# Patient Record
Sex: FEMALE | Race: BLACK OR AFRICAN AMERICAN | NOT HISPANIC OR LATINO | Employment: UNEMPLOYED | URBAN - METROPOLITAN AREA
[De-identification: names, ages, dates, MRNs, and addresses within clinical notes are randomized per-mention and may not be internally consistent; named-entity substitution may affect disease eponyms.]

---

## 2023-09-12 ENCOUNTER — HOSPITAL ENCOUNTER (OUTPATIENT)
Dept: DATA CONVERSION | Facility: HOSPITAL | Age: 43
Discharge: HOME | End: 2023-09-12
Payer: COMMERCIAL

## 2023-09-12 DIAGNOSIS — E55.9 VITAMIN D DEFICIENCY, UNSPECIFIED: ICD-10-CM

## 2023-09-12 DIAGNOSIS — D86.9 SARCOIDOSIS, UNSPECIFIED: ICD-10-CM

## 2023-09-12 DIAGNOSIS — Z79.899 OTHER LONG TERM (CURRENT) DRUG THERAPY: ICD-10-CM

## 2023-09-12 DIAGNOSIS — M06.09 RHEUMATOID ARTHRITIS WITHOUT RHEUMATOID FACTOR, MULTIPLE SITES (MULTI): ICD-10-CM

## 2023-09-12 LAB
25(OH)D3 SERPL-MCNC: 12 NG/ML (ref 31–100)
ALBUMIN SERPL-MCNC: 4.5 GM/DL (ref 3.5–5)
ALBUMIN/GLOB SERPL: 1.5 RATIO (ref 1.5–3)
ALP BLD-CCNC: 67 U/L (ref 35–125)
ALT SERPL-CCNC: 17 U/L (ref 5–40)
ANION GAP SERPL CALCULATED.3IONS-SCNC: 11 MMOL/L (ref 0–19)
AST SERPL-CCNC: 19 U/L (ref 5–40)
BACTERIA UR QL AUTO: NEGATIVE
BASOPHILS # BLD AUTO: 0.07 K/UL (ref 0–0.22)
BASOPHILS NFR BLD AUTO: 1.5 % (ref 0–1)
BILIRUB SERPL-MCNC: 0.4 MG/DL (ref 0.1–1.2)
BILIRUB UR QL STRIP.AUTO: NEGATIVE
BUN SERPL-MCNC: 8 MG/DL (ref 8–25)
BUN/CREAT SERPL: 10 RATIO (ref 8–21)
CALCIUM SERPL-MCNC: 9.9 MG/DL (ref 8.5–10.4)
CHLORIDE SERPL-SCNC: 105 MMOL/L (ref 97–107)
CK MB CFR SERPL CALC: 1.3 % (ref 0–3.5)
CK MB SERPL-MCNC: 2 NG/ML (ref 0–5)
CK SERPL-CCNC: 160 U/L (ref 24–195)
CLARITY UR: CLEAR
CO2 SERPL-SCNC: 24 MMOL/L (ref 24–31)
COLOR UR: ABNORMAL
CREAT SERPL-MCNC: 0.8 MG/DL (ref 0.4–1.6)
CRP SERPL-MCNC: 0.3 MG/DL (ref 0–2)
DEPRECATED RDW RBC AUTO: 37.7 FL (ref 37–54)
DIFFERENTIAL METHOD BLD: ABNORMAL
EOSINOPHIL # BLD AUTO: 0.31 K/UL (ref 0–0.45)
EOSINOPHIL NFR BLD: 6.5 % (ref 0–3)
ERYTHROCYTE [DISTWIDTH] IN BLOOD BY AUTOMATED COUNT: 11.9 % (ref 11.7–15)
ERYTHROCYTE [SEDIMENTATION RATE] IN BLOOD BY WESTERGREN METHOD: 38 MM/HR (ref 0–20)
GFR SERPL CREATININE-BSD FRML MDRD: 94 ML/MIN/1.73 M2
GLOBULIN SER-MCNC: 3.1 G/DL (ref 1.9–3.7)
GLUCOSE SERPL-MCNC: 83 MG/DL (ref 65–99)
GLUCOSE UR STRIP.AUTO-MCNC: NEGATIVE MG/DL
HCT VFR BLD AUTO: 42 % (ref 36–44)
HGB BLD-MCNC: 13.1 GM/DL (ref 12–15)
HGB UR QL STRIP.AUTO: 2 /HPF (ref 0–3)
HGB UR QL: ABNORMAL
HYALINE CASTS UR QL AUTO: 3 /LPF
IMM GRANULOCYTES # BLD AUTO: 0.01 K/UL (ref 0–0.1)
KETONES UR QL STRIP.AUTO: NEGATIVE
LEUKOCYTE ESTERASE UR QL STRIP.AUTO: NEGATIVE
LYMPHOCYTES # BLD AUTO: 2.73 K/UL (ref 1.2–3.2)
LYMPHOCYTES NFR BLD MANUAL: 57.5 % (ref 20–40)
MCH RBC QN AUTO: 26.8 PG (ref 26–34)
MCHC RBC AUTO-ENTMCNC: 31.2 % (ref 31–37)
MCV RBC AUTO: 86.1 FL (ref 80–100)
MICROSCOPIC (UA): ABNORMAL
MONOCYTES # BLD AUTO: 0.26 K/UL (ref 0–0.8)
MONOCYTES NFR BLD MANUAL: 5.5 % (ref 0–8)
NEUTROPHILS # BLD AUTO: 1.37 K/UL
NEUTROPHILS # BLD AUTO: 1.37 K/UL (ref 1.8–7.7)
NEUTROPHILS.IMMATURE NFR BLD: 0.2 % (ref 0–1)
NEUTS SEG NFR BLD: 28.8 % (ref 50–70)
NITRITE UR QL STRIP.AUTO: NEGATIVE
NRBC BLD-RTO: 0 /100 WBC
PH UR STRIP.AUTO: 6 [PH] (ref 4.6–8)
PLATELET # BLD AUTO: 330 K/UL (ref 150–450)
PMV BLD AUTO: 10.7 CU (ref 7–12.6)
POTASSIUM SERPL-SCNC: 4.3 MMOL/L (ref 3.4–5.1)
PROT SERPL-MCNC: 7.6 G/DL (ref 5.9–7.9)
PROT UR STRIP.AUTO-MCNC: NEGATIVE MG/DL
RBC # BLD AUTO: 4.88 M/UL (ref 4–4.9)
REF LAB TEST RESULTS: NORMAL
SODIUM SERPL-SCNC: 140 MMOL/L (ref 133–145)
SP GR UR STRIP.AUTO: 1.02 (ref 1–1.03)
SQUAMOUS UR QL AUTO: ABNORMAL /HPF
URINE CULTURE: ABNORMAL
UROBILINOGEN UR QL STRIP.AUTO: NORMAL MG/DL (ref 0–1)
WBC # BLD AUTO: 4.8 K/UL (ref 4.5–11)
WBC #/AREA URNS AUTO: 2 /HPF (ref 0–3)

## 2023-09-14 LAB — ACE SERPL-CCNC: NORMAL U/L

## 2023-09-15 LAB
1,25(OH)2D3 SERPL-MCNC: NORMAL PG/ML
NIL(NEG) CONTROL SPOT COUNT: NORMAL
PANEL A SPOT COUNT: NORMAL
PANEL B SPOT COUNT: NORMAL
POS CONTROL SPOT COUNT: NORMAL
T SPOT RESULT: NORMAL

## 2023-09-25 ENCOUNTER — DOCUMENTATION (OUTPATIENT)
Dept: PRIMARY CARE | Facility: CLINIC | Age: 43
End: 2023-09-25
Payer: COMMERCIAL

## 2023-10-06 DIAGNOSIS — G89.29 OTHER CHRONIC PAIN: Primary | ICD-10-CM

## 2023-10-06 RX ORDER — OXYCODONE HYDROCHLORIDE AND ACETAMINOPHEN 5; 325 MG/1; MG/1
1 TABLET ORAL EVERY 6 HOURS PRN
COMMUNITY
Start: 2022-06-28 | End: 2023-10-06 | Stop reason: SDUPTHER

## 2023-10-07 RX ORDER — OXYCODONE HYDROCHLORIDE AND ACETAMINOPHEN 5; 325 MG/1; MG/1
1 TABLET ORAL EVERY 6 HOURS PRN
Qty: 28 TABLET | Refills: 0 | Status: SHIPPED | OUTPATIENT
Start: 2023-10-07 | End: 2023-10-13 | Stop reason: SDUPTHER

## 2023-10-13 DIAGNOSIS — G89.29 OTHER CHRONIC PAIN: ICD-10-CM

## 2023-10-13 RX ORDER — OXYCODONE HYDROCHLORIDE AND ACETAMINOPHEN 5; 325 MG/1; MG/1
1 TABLET ORAL EVERY 6 HOURS PRN
Qty: 28 TABLET | Refills: 0 | Status: SHIPPED | OUTPATIENT
Start: 2023-10-13 | End: 2023-10-20

## 2023-10-17 RX ORDER — ALBUTEROL SULFATE 90 UG/1
1 AEROSOL, METERED RESPIRATORY (INHALATION) EVERY 4 HOURS PRN
COMMUNITY
End: 2023-10-20 | Stop reason: SDUPTHER

## 2023-10-17 RX ORDER — ALBUTEROL SULFATE 90 UG/1
1 AEROSOL, METERED RESPIRATORY (INHALATION) EVERY 4 HOURS PRN
Qty: 18 G | Refills: 2 | Status: CANCELLED | OUTPATIENT
Start: 2023-10-17

## 2023-10-17 NOTE — TELEPHONE ENCOUNTER
Patient requests refill on rescue inhaler. Last appt at [facility] Jan. 2023. I tried to schedule her for a FUV. She stated she just started a job and can't take off right now. I put her on list to call for a FUVThanks.

## 2023-10-18 ENCOUNTER — TELEPHONE (OUTPATIENT)
Dept: PRIMARY CARE | Facility: CLINIC | Age: 43
End: 2023-10-18
Payer: COMMERCIAL

## 2023-10-20 ENCOUNTER — PATIENT OUTREACH (OUTPATIENT)
Dept: PRIMARY CARE | Facility: CLINIC | Age: 43
End: 2023-10-20
Payer: COMMERCIAL

## 2023-10-20 DIAGNOSIS — M06.09 POLYARTHRITIS WITH NEGATIVE RHEUMATOID FACTOR (MULTI): ICD-10-CM

## 2023-10-20 DIAGNOSIS — M19.90 CHRONIC ARTHRITIS: ICD-10-CM

## 2023-10-20 DIAGNOSIS — J45.909 ASTHMA, UNSPECIFIED ASTHMA SEVERITY, UNSPECIFIED WHETHER COMPLICATED, UNSPECIFIED WHETHER PERSISTENT (HHS-HCC): Primary | ICD-10-CM

## 2023-10-20 PROCEDURE — 99490 CHRNC CARE MGMT STAFF 1ST 20: CPT | Performed by: STUDENT IN AN ORGANIZED HEALTH CARE EDUCATION/TRAINING PROGRAM

## 2023-10-20 PROCEDURE — 99439 CHRNC CARE MGMT STAF EA ADDL: CPT | Performed by: STUDENT IN AN ORGANIZED HEALTH CARE EDUCATION/TRAINING PROGRAM

## 2023-10-20 RX ORDER — BENZONATATE 100 MG/1
100 CAPSULE ORAL 3 TIMES DAILY PRN
COMMUNITY

## 2023-10-20 RX ORDER — ALBUTEROL SULFATE 0.83 MG/ML
2.5 SOLUTION RESPIRATORY (INHALATION) EVERY 6 HOURS PRN
COMMUNITY

## 2023-10-20 RX ORDER — MECLIZINE HYDROCHLORIDE 25 MG/1
25 TABLET ORAL 2 TIMES DAILY
COMMUNITY

## 2023-10-20 RX ORDER — DUPILUMAB 300 MG/2ML
300 INJECTION, SOLUTION SUBCUTANEOUS
COMMUNITY

## 2023-10-20 RX ORDER — CYCLOBENZAPRINE HCL 10 MG
10 TABLET ORAL AS NEEDED
COMMUNITY
End: 2024-02-06 | Stop reason: SDUPTHER

## 2023-10-20 RX ORDER — TRAMADOL HYDROCHLORIDE 50 MG/1
50 TABLET ORAL 2 TIMES DAILY
COMMUNITY
End: 2024-03-12 | Stop reason: SDUPTHER

## 2023-10-20 RX ORDER — NAPROXEN 500 MG/1
500 TABLET ORAL
COMMUNITY
End: 2024-01-09 | Stop reason: ALTCHOICE

## 2023-10-20 RX ORDER — ALBUTEROL SULFATE 90 UG/1
1 AEROSOL, METERED RESPIRATORY (INHALATION) EVERY 4 HOURS PRN
Qty: 18 G | Refills: 2 | Status: SHIPPED | OUTPATIENT
Start: 2023-10-20 | End: 2024-02-28 | Stop reason: SDUPTHER

## 2023-10-20 RX ORDER — FLUTICASONE PROPIONATE 50 MCG
1 SPRAY, SUSPENSION (ML) NASAL DAILY
COMMUNITY

## 2023-10-20 RX ORDER — SULFASALAZINE 500 MG/1
500 TABLET, DELAYED RELEASE ORAL 2 TIMES DAILY
COMMUNITY
End: 2024-02-06 | Stop reason: SDUPTHER

## 2023-10-20 RX ORDER — FOLIC ACID 1 MG/1
1 TABLET ORAL DAILY
COMMUNITY
End: 2024-01-10 | Stop reason: ALTCHOICE

## 2023-10-20 NOTE — PROGRESS NOTES
LOV 6/21/2023= BP: 110/70, HR: 67 /min, Temp: 98.7, Oxygen sat %: 98.  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 Medications reviewed and updated.  Managing pain with combination Percocet BID, muscle relaxants, Tramadol.  Stands and stretches as job allows.  Utilizing equipment such as cane and walker as needed for safety.  Has decided to not pursue Disability Claim at this time while she has gainful employment.  Spouse is social support for her at this time.  Next follow up call planned 1 month

## 2023-10-24 DIAGNOSIS — G89.29 OTHER CHRONIC PAIN: ICD-10-CM

## 2023-10-25 RX ORDER — OXYCODONE AND ACETAMINOPHEN 5; 325 MG/1; MG/1
1 TABLET ORAL EVERY 6 HOURS PRN
Qty: 5 TABLET | Refills: 0 | Status: SHIPPED | OUTPATIENT
Start: 2023-10-25 | End: 2023-11-06 | Stop reason: SDUPTHER

## 2023-11-06 DIAGNOSIS — G89.29 OTHER CHRONIC PAIN: ICD-10-CM

## 2023-11-06 RX ORDER — OXYCODONE AND ACETAMINOPHEN 5; 325 MG/1; MG/1
1 TABLET ORAL EVERY 4 HOURS PRN
Qty: 28 TABLET | Refills: 0 | Status: SHIPPED | OUTPATIENT
Start: 2023-11-06 | End: 2023-11-13

## 2023-11-06 NOTE — TELEPHONE ENCOUNTER
Ok to give letter for cordless request, thanks.      Letter completed & Dr Garcia signed & sent---FINN

## 2023-11-06 NOTE — TELEPHONE ENCOUNTER
PT CALLED, NEEDS REFILL OF OXYCODONE BECAUSE LAST REFILL WAS ONLY 5 PILLS.    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

## 2023-11-08 ENCOUNTER — TELEPHONE (OUTPATIENT)
Dept: RHEUMATOLOGY | Facility: CLINIC | Age: 43
End: 2023-11-08
Payer: COMMERCIAL

## 2023-11-08 DIAGNOSIS — U07.1 COVID: Primary | ICD-10-CM

## 2023-11-08 NOTE — TELEPHONE ENCOUNTER
PT CALLED, SHE IS COVID +. SYMPTOMS STARTED MONDAY; ASKING FOR THE PILLS SHE HAS GOTTEN BEFORE. PT INSTRUCTED TO PUSH FLUIDS, TAKE ZINC VITAMIN C & D

## 2023-11-10 NOTE — TELEPHONE ENCOUNTER
Rx sent. Pt to take half dose and/or increase time in between pain medication due to interaction/oversedation. Pt to also check with pharmacist in case there are other interactions with her current meds.

## 2023-11-13 ENCOUNTER — TELEPHONE (OUTPATIENT)
Dept: RHEUMATOLOGY | Facility: CLINIC | Age: 43
End: 2023-11-13
Payer: COMMERCIAL

## 2023-11-13 DIAGNOSIS — G89.29 OTHER CHRONIC PAIN: Primary | ICD-10-CM

## 2023-11-13 NOTE — TELEPHONE ENCOUNTER
PT CALLED, HAS LARYNGITIS & ASKING FOR ADVISE. EXPLAINED TO PT THAT THE ONLY TREATMENT IS VOICE REST. MAY USE LOZENGES AND/OR GARGLE WITH SALT WATER BUT NEEDS TO NOT SPEAK. ALSO WANTED TO LET YOU KNOW PHARMACY FILLED LATEST Rx WITH INDOMET  & OXYCODONE WORKS BETTER

## 2023-11-14 ENCOUNTER — APPOINTMENT (OUTPATIENT)
Dept: RADIOLOGY | Facility: HOSPITAL | Age: 43
End: 2023-11-14
Payer: COMMERCIAL

## 2023-11-14 ENCOUNTER — HOSPITAL ENCOUNTER (EMERGENCY)
Facility: HOSPITAL | Age: 43
Discharge: HOME | End: 2023-11-14
Attending: STUDENT IN AN ORGANIZED HEALTH CARE EDUCATION/TRAINING PROGRAM
Payer: COMMERCIAL

## 2023-11-14 ENCOUNTER — HOSPITAL ENCOUNTER (OUTPATIENT)
Dept: CARDIOLOGY | Facility: HOSPITAL | Age: 43
Discharge: HOME | End: 2023-11-14
Payer: COMMERCIAL

## 2023-11-14 VITALS
HEART RATE: 66 BPM | OXYGEN SATURATION: 100 % | DIASTOLIC BLOOD PRESSURE: 61 MMHG | WEIGHT: 165 LBS | HEIGHT: 65 IN | RESPIRATION RATE: 20 BRPM | BODY MASS INDEX: 27.49 KG/M2 | SYSTOLIC BLOOD PRESSURE: 119 MMHG | TEMPERATURE: 97.2 F

## 2023-11-14 DIAGNOSIS — J45.901 MILD ASTHMA WITH EXACERBATION, UNSPECIFIED WHETHER PERSISTENT (HHS-HCC): Primary | ICD-10-CM

## 2023-11-14 DIAGNOSIS — B34.9 VIRAL ILLNESS: ICD-10-CM

## 2023-11-14 LAB
ALBUMIN SERPL-MCNC: 4 G/DL (ref 3.5–5)
ALP BLD-CCNC: 62 U/L (ref 35–125)
ALT SERPL-CCNC: 16 U/L (ref 5–40)
ANION GAP SERPL CALC-SCNC: 9 MMOL/L
AST SERPL-CCNC: 17 U/L (ref 5–40)
ATRIAL RATE: 65 BPM
BASOPHILS # BLD AUTO: 0.04 X10*3/UL (ref 0–0.1)
BASOPHILS NFR BLD AUTO: 0.6 %
BILIRUB SERPL-MCNC: <0.2 MG/DL (ref 0.1–1.2)
BUN SERPL-MCNC: 5 MG/DL (ref 8–25)
CALCIUM SERPL-MCNC: 9.4 MG/DL (ref 8.5–10.4)
CHLORIDE SERPL-SCNC: 105 MMOL/L (ref 97–107)
CO2 SERPL-SCNC: 26 MMOL/L (ref 24–31)
CREAT SERPL-MCNC: 0.7 MG/DL (ref 0.4–1.6)
EOSINOPHIL # BLD AUTO: 0.28 X10*3/UL (ref 0–0.7)
EOSINOPHIL NFR BLD AUTO: 4 %
ERYTHROCYTE [DISTWIDTH] IN BLOOD BY AUTOMATED COUNT: 12.1 % (ref 11.5–14.5)
FLUAV RNA RESP QL NAA+PROBE: NOT DETECTED
FLUBV RNA RESP QL NAA+PROBE: NOT DETECTED
GFR SERPL CREATININE-BSD FRML MDRD: >90 ML/MIN/1.73M*2
GLUCOSE SERPL-MCNC: 101 MG/DL (ref 65–99)
HCT VFR BLD AUTO: 39.1 % (ref 36–46)
HGB BLD-MCNC: 12.1 G/DL (ref 12–16)
IMM GRANULOCYTES # BLD AUTO: 0.03 X10*3/UL (ref 0–0.7)
IMM GRANULOCYTES NFR BLD AUTO: 0.4 % (ref 0–0.9)
LYMPHOCYTES # BLD AUTO: 2.67 X10*3/UL (ref 1.2–4.8)
LYMPHOCYTES NFR BLD AUTO: 38.4 %
MCH RBC QN AUTO: 26.2 PG (ref 26–34)
MCHC RBC AUTO-ENTMCNC: 30.9 G/DL (ref 32–36)
MCV RBC AUTO: 85 FL (ref 80–100)
MONOCYTES # BLD AUTO: 0.45 X10*3/UL (ref 0.1–1)
MONOCYTES NFR BLD AUTO: 6.5 %
NEUTROPHILS # BLD AUTO: 3.48 X10*3/UL (ref 1.2–7.7)
NEUTROPHILS NFR BLD AUTO: 50.1 %
NRBC BLD-RTO: 0 /100 WBCS (ref 0–0)
P AXIS: 71 DEGREES
P OFFSET: 191 MS
P ONSET: 147 MS
PLATELET # BLD AUTO: 336 X10*3/UL (ref 150–450)
POTASSIUM SERPL-SCNC: 4 MMOL/L (ref 3.4–5.1)
PR INTERVAL: 156 MS
PROT SERPL-MCNC: 7.2 G/DL (ref 5.9–7.9)
Q ONSET: 225 MS
QRS COUNT: 10 BEATS
QRS DURATION: 68 MS
QT INTERVAL: 406 MS
QTC CALCULATION(BAZETT): 422 MS
QTC FREDERICIA: 416 MS
R AXIS: 87 DEGREES
RBC # BLD AUTO: 4.62 X10*6/UL (ref 4–5.2)
SARS-COV-2 RNA RESP QL NAA+PROBE: NOT DETECTED
SODIUM SERPL-SCNC: 140 MMOL/L (ref 133–145)
T AXIS: 65 DEGREES
T OFFSET: 428 MS
TROPONIN T SERPL-MCNC: <6 NG/L
VENTRICULAR RATE: 65 BPM
WBC # BLD AUTO: 7 X10*3/UL (ref 4.4–11.3)

## 2023-11-14 PROCEDURE — 94760 N-INVAS EAR/PLS OXIMETRY 1: CPT

## 2023-11-14 PROCEDURE — 93005 ELECTROCARDIOGRAM TRACING: CPT

## 2023-11-14 PROCEDURE — 2500000002 HC RX 250 W HCPCS SELF ADMINISTERED DRUGS (ALT 637 FOR MEDICARE OP, ALT 636 FOR OP/ED): Performed by: PHYSICIAN ASSISTANT

## 2023-11-14 PROCEDURE — 36415 COLL VENOUS BLD VENIPUNCTURE: CPT | Performed by: PHYSICIAN ASSISTANT

## 2023-11-14 PROCEDURE — 99285 EMERGENCY DEPT VISIT HI MDM: CPT | Mod: 25 | Performed by: STUDENT IN AN ORGANIZED HEALTH CARE EDUCATION/TRAINING PROGRAM

## 2023-11-14 PROCEDURE — 2500000004 HC RX 250 GENERAL PHARMACY W/ HCPCS (ALT 636 FOR OP/ED): Performed by: PHYSICIAN ASSISTANT

## 2023-11-14 PROCEDURE — 71045 X-RAY EXAM CHEST 1 VIEW: CPT

## 2023-11-14 PROCEDURE — 80053 COMPREHEN METABOLIC PANEL: CPT | Performed by: PHYSICIAN ASSISTANT

## 2023-11-14 PROCEDURE — 85025 COMPLETE CBC W/AUTO DIFF WBC: CPT | Performed by: PHYSICIAN ASSISTANT

## 2023-11-14 PROCEDURE — 99284 EMERGENCY DEPT VISIT MOD MDM: CPT | Performed by: STUDENT IN AN ORGANIZED HEALTH CARE EDUCATION/TRAINING PROGRAM

## 2023-11-14 PROCEDURE — 84484 ASSAY OF TROPONIN QUANT: CPT | Performed by: PHYSICIAN ASSISTANT

## 2023-11-14 PROCEDURE — 87636 SARSCOV2 & INF A&B AMP PRB: CPT | Performed by: PHYSICIAN ASSISTANT

## 2023-11-14 PROCEDURE — 96374 THER/PROPH/DIAG INJ IV PUSH: CPT | Performed by: STUDENT IN AN ORGANIZED HEALTH CARE EDUCATION/TRAINING PROGRAM

## 2023-11-14 RX ORDER — IPRATROPIUM BROMIDE AND ALBUTEROL SULFATE 2.5; .5 MG/3ML; MG/3ML
6 SOLUTION RESPIRATORY (INHALATION) ONCE
Status: COMPLETED | OUTPATIENT
Start: 2023-11-14 | End: 2023-11-14

## 2023-11-14 RX ADMIN — IPRATROPIUM BROMIDE AND ALBUTEROL SULFATE 6 ML: .5; 3 SOLUTION RESPIRATORY (INHALATION) at 09:05

## 2023-11-14 RX ADMIN — SODIUM CHLORIDE 1000 ML: 900 INJECTION, SOLUTION INTRAVENOUS at 09:05

## 2023-11-14 RX ADMIN — METHYLPREDNISOLONE SODIUM SUCCINATE 125 MG: 125 INJECTION, POWDER, FOR SOLUTION INTRAMUSCULAR; INTRAVENOUS at 09:05

## 2023-11-14 ASSESSMENT — PAIN DESCRIPTION - DESCRIPTORS: DESCRIPTORS: ACHING

## 2023-11-14 ASSESSMENT — COLUMBIA-SUICIDE SEVERITY RATING SCALE - C-SSRS
2. HAVE YOU ACTUALLY HAD ANY THOUGHTS OF KILLING YOURSELF?: NO
6. HAVE YOU EVER DONE ANYTHING, STARTED TO DO ANYTHING, OR PREPARED TO DO ANYTHING TO END YOUR LIFE?: NO
1. IN THE PAST MONTH, HAVE YOU WISHED YOU WERE DEAD OR WISHED YOU COULD GO TO SLEEP AND NOT WAKE UP?: NO

## 2023-11-14 ASSESSMENT — PAIN SCALES - GENERAL
PAINLEVEL_OUTOF10: 6
PAINLEVEL_OUTOF10: 3

## 2023-11-14 ASSESSMENT — PAIN DESCRIPTION - ORIENTATION: ORIENTATION: RIGHT;LEFT

## 2023-11-14 ASSESSMENT — PAIN DESCRIPTION - PAIN TYPE: TYPE: ACUTE PAIN

## 2023-11-14 ASSESSMENT — PAIN - FUNCTIONAL ASSESSMENT
PAIN_FUNCTIONAL_ASSESSMENT: 0-10
PAIN_FUNCTIONAL_ASSESSMENT: 0-10

## 2023-11-14 ASSESSMENT — PAIN DESCRIPTION - ONSET: ONSET: ONGOING

## 2023-11-14 ASSESSMENT — PAIN DESCRIPTION - PROGRESSION: CLINICAL_PROGRESSION: NOT CHANGED

## 2023-11-14 ASSESSMENT — PAIN DESCRIPTION - FREQUENCY: FREQUENCY: CONSTANT/CONTINUOUS

## 2023-11-14 ASSESSMENT — PAIN DESCRIPTION - LOCATION: LOCATION: NECK

## 2023-11-14 NOTE — DISCHARGE INSTRUCTIONS
Thank you for choosing [facility]  for your emergency care.    Please return to the Emergency Department immediately if new or worsening symptoms occur. Symptoms that are most concerning include worsening shortness of breath, fevers, worsening cough that necessitate immediate return.     It is important to remember that your care does not end here and you must continue to monitor your condition closely. Please return to the emergency department for any worsening or concerning signs or symptoms as directed by our conversations and the discharge instructions. Otherwise please follow up with your doctor in 2 days if no better or worse. If you do not have a doctor please contact the referral number on your discharge instructions. Please contact any physician specialists provided in your discharge notes as it is very important to follow up with them regarding your condition. If you are unable to reach the physicians provided, please come back to the Emergency Department at any time.      As always, please take medications as directed. If you have any questions at all regarding your medications, please contact the pharmacist, the emergency department, or your doctor. Before taking any medication prescribed in the Emergency Department, please review the medication side effects and drug interactions as they may interact with your home medications.     Having trouble affording medications? Try [employer] ! (This is not a hospital endorsed website, merely a recommendation based on my own personal experiences with [employer])       Hayden Barber MD

## 2023-11-14 NOTE — ED PROVIDER NOTES
HPI   Chief Complaint   Patient presents with    Shortness of Breath     Asthma exacerbation       HPI  43-year-old female here for dyspnea, history of asthma and sarcoidosis and rheumatoid conditions, patient has indicated that she has had 2 weeks of generalized viral-like symptoms and its been causing asthma exacerbations.  She says that she feels wheezy and has been having trouble managing her symptoms at home, she wanted to come in today for assessment.                  No data recorded                Patient History   Past Medical History:   Diagnosis Date    Snoring     Snoring     Past Surgical History:   Procedure Laterality Date    APPENDECTOMY  11/21/2013    Appendectomy    HERNIA REPAIR  11/21/2013    Hernia Repair    TUBAL LIGATION  11/21/2013    Tubal Ligation     No family history on file.  Social History     Tobacco Use    Smoking status: Not on file    Smokeless tobacco: Not on file   Substance Use Topics    Alcohol use: Not on file    Drug use: Not on file       Physical Exam   ED Triage Vitals [11/14/23 0837]   Temp Heart Rate Resp BP   36.2 °C (97.2 °F) 69 18 (!) 135/91      SpO2 Temp Source Heart Rate Source Patient Position   100 % Temporal Monitor Sitting      BP Location FiO2 (%)     Right arm --       Physical Exam  PHYSICAL EXAMINATION    GENERAL APPEARANCE: Awake and alert.     VITAL SIGNS: As per the nurses' triage record.     HEENT: Normocephalic, atraumatic. Extraocular muscles are intact. Pupils equal round and reactive to light. Conjunctiva are pink. Negative scleral icterus. Mucous membranes are moist. Tongue in the midline. Pharynx was without erythema or exudates, uvula midline    NECK: Soft Nontender and supple, full gross ROM, no meningeal signs.    CHEST: Nontender to palpation.  Diffusely wheezy with some coarse breath sounds in all lung fields    HEART: S1, S2. Regular rate and rhythm. No murmurs, gallops or rubs.  Strong and equal pulses in the extremities.     ABDOMEN: Soft,  nontender, nondistended, positive bowel sounds, no palpable masses.    MUSCULOSKELETAL: The calves are nontender to palpation. Full gross active range of motion. Ambulating on own with no acute difficulties     NEUROLOGICAL: Awake, alert and oriented x 3. Power intact in the upper and lower extremities. Sensation is intact to light touch in the upper and lower extremities.     IMMUNOLOGICAL: No lymphatic streaking noted     DERM: No petechiae, rashes, or ecchymoses.  ED Course & MDM   ED Course as of 11/14/23 1111   Tue Nov 14, 2023   0849 EKG presented to me at 8:49 AM     EKG as interpreted by me shows normal sinus rhythm at 65 bpm, normal axis, normal intervals, no ST changes to suggest ischemia.   [DH]      ED Course User Index  [DH] Hayden Barber MD         Diagnoses as of 11/14/23 1111   Mild asthma with exacerbation, unspecified whether persistent   Viral illness       Medical Decision Making  Patient has been seen in conjunction with attending physician Dr. Barber    Parts of this chart have been completed using voice recognition software. Please excuse any errors of transcription.  My thought process and reason for plan has been formulated from the time that I saw the patient until the time of disposition and is not specific to one specific moment during their visit and furthermore my MDM encompasses this entire chart and not only this text box.      HPI: Detailed above.    Exam: A medically appropriate exam performed, outlined above, given the known history and presentation.    History obtained from: The patient    EKG: Obtained read by attending physician and reviewed myself    Social Determinants of Health considered during this visit: Lives at home    Medications given during visit:  Medications   sodium chloride 0.9 % bolus 1,000 mL (0 mL intravenous Stopped 11/14/23 0935)   ipratropium-albuteroL (Duo-Neb) 0.5-2.5 mg/3 mL nebulizer solution 6 mL (6 mL nebulization Given 11/14/23 0905)   methylPREDNISolone  sod succinate (PF) (SOLU-Medrol) injection 125 mg (125 mg intravenous Given 11/14/23 0905)        Diagnostic/tests  Labs Reviewed   CBC WITH AUTO DIFFERENTIAL - Abnormal       Result Value    WBC 7.0      nRBC 0.0      RBC 4.62      Hemoglobin 12.1      Hematocrit 39.1      MCV 85      MCH 26.2      MCHC 30.9 (*)     RDW 12.1      Platelets 336      Neutrophils % 50.1      Immature Granulocytes %, Automated 0.4      Lymphocytes % 38.4      Monocytes % 6.5      Eosinophils % 4.0      Basophils % 0.6      Neutrophils Absolute 3.48      Immature Granulocytes Absolute, Automated 0.03      Lymphocytes Absolute 2.67      Monocytes Absolute 0.45      Eosinophils Absolute 0.28      Basophils Absolute 0.04     COMPREHENSIVE METABOLIC PANEL - Abnormal    Glucose 101 (*)     Sodium 140      Potassium 4.0      Chloride 105      Bicarbonate 26      Urea Nitrogen 5 (*)     Creatinine 0.70      eGFR >90      Calcium 9.4      Albumin 4.0      Alkaline Phosphatase 62      Total Protein 7.2      AST 17      Bilirubin, Total <0.2      ALT 16      Anion Gap 9     SARS-COV-2 PCR, SYMPTOMATIC - Normal    Coronavirus 2019, PCR Not Detected      Narrative:     This assay has received FDA Emergency Use Authorization (EUA) and is only authorized for the duration of time that circumstances exist to justify the authorization of the emergency use of in vitro diagnostic tests for the detection of SARS-CoV-2 virus and/or diagnosis of COVID-19 infection under section 564(b)(1) of the Act, 21 U.S.C. 360bbb-3(b)(1). This assay is an in vitro diagnostic nucleic acid amplification test for the qualitative detection of SARS-CoV-2 from nasopharyngeal specimens and has been validated for use at [facility]. Negative results do not preclude COVID-19 infections and should not be used as the sole basis for diagnosis, treatment, or other management decisions.     INFLUENZA A AND B PCR - Normal    Flu A Result Not Detected      Flu B  Result Not Detected      Narrative:     This assay is an in vitro diagnostic multiplex nucleic acid amplification test for the detection and discrimination of Influenza A & B from nasopharyngeal specimens, and has been validated for use at [facility]. Negative results do not preclude Influenza A/B infections, and should not be used as the sole basis for diagnosis, treatment, or other management decisions. If Influenza A/B and RSV PCR results are negative, testing for Parainfluenza virus, Adenovirus and Metapneumovirus is routinely performed for [facility] pediatric oncology and intensive care inpatients, and is available on other patients by placing an add-on request.   SERIAL TROPONIN, INITIAL ([facility]) - Normal    Troponin T, High Sensitivity <6     TROPONIN T SERIES, HIGH SENSITIVITY (0, 2 HR, 6 HR)    Narrative:     The following orders were created for panel order Troponin T Series, High Sensitivity (0, 2HR, 6HR).  Procedure                               Abnormality         Status                     ---------                               -----------         ------                     Serial Troponin, Initial...[[id number]]  Normal              Final result               Serial Troponin, 2 Hour ...[[id number]]                                                   Please view results for these tests on the individual orders.   URINALYSIS WITH REFLEX MICROSCOPIC AND CULTURE   SERIAL TROPONIN,  2 HOUR ([facility])      XR chest 1 view   Final Result   No acute cardiopulmonary disease.        Signed by: Sunny Davis 11/14/2023 10:35 AM   Dictation workstation:   [id number]           Considerations/further MDM:  I estimate there is LOW risk for airway compromise requiring admission.  I have considered:  EPIGLOTTITIS, PNEUMONIA, MENINGITIS, OR URINARY TRACT INFECTION, PULMONARY CONTUSION, RESPIRATORY DISTRESS, EPIGLOTITIS, SINUSITIS, PULMONARY EFFUSION, CAD, ACS, RIB FRACTURE, ESOPHOGEAL VARICIES, RETAINED  FB,   thus I consider the discharge disposition reasonable. Also, there is no evidence for peritonitis, sepsis, or toxicity. We have discussed the diagnosis and risks, and we agree with discharging home to follow-up with their primary doctor. We also discussed returning to the Emergency Department immediately if new or worsening symptoms occur. We have discussed the symptoms which are most concerning (e.g., changing or worsening pain, trouble swallowing or breathing, neck stiffness, fever) that necessitate immediate return.  Difficult improvement with medications provided.  Patient feels comfortable home-going plan.    Patient has been discharged by attending physician.  Please refer to their note for details of discharge instructions, home going plan, home going medications, return precautions follow-up instructions and final disposition plan      ED Supervising Attending Note & Attestation   I was the Supervising Physician. I have seen face to face and examined the patient; reviewed history and physical, performed a substantive portion of the medical decision making, and discussed the medical decision making with the Medical Student, Resident, Fellow, PA and/or NP. I agree with the assessment and plan as presented unless otherwise documented as follows:     43-year-old female past medical history of asthma who presents to the emergency room with shortness of breath.  Patient notes her symptoms have been going on for the past few days and has worsened more recently.  She also complains of cough for the last 3 days which is productive and she is otherwise intermittently been using her nebulizer at home with only limited relief.  Patient had 3 bouts of coughing and trouble breathing this past evening which prompted her to present to the emergency room today.  Patient otherwise denies any significant fevers or chills, nausea, vomiting, abdominal pain, nausea, vomiting.    Vital signs reviewed: Afebrile, normotensive,  69 bpm, 100% on room air    Exam     Constitutional: No acute distress. Resting comfortably.   Head: Normocephalic, atraumatic.   Eyes: Pupils equal bilaterally, EOM grossly intact, conjunctiva normal.  Mouth/Throat: Oropharynx is clear, moist mucus membranes.   Neck: Supple. No lymphadenopathy.  Cardiovascular: Regular rate and regular rhythm. Extremities are well-perfused.   Pulmonary/Chest: No respiratory distress, breathing comfortably on room air.  Lungs clear to auscultation bilaterally.  Abdominal: Soft, non-tender, non-distended. No rebound or guarding.   Musculoskeletal: No lower extremity edema.       Skin: Warm, dry, and intact.   Neurological: Patient is oriented to person, place, time, and situation. Face symmetric, hearing intact to voice, speech normal. Moves all extremities.     Differential includes but is not limited to:  Asthma exacerbation in the setting of viral illness versus pneumonia versus ACS    Amount and/or Complexity of Data Reviewed  External Data Reviewed: notes.      Labs: ordered.  Labs Reviewed   CBC WITH AUTO DIFFERENTIAL - Abnormal       Result Value    WBC 7.0      nRBC 0.0      RBC 4.62      Hemoglobin 12.1      Hematocrit 39.1      MCV 85      MCH 26.2      MCHC 30.9 (*)     RDW 12.1      Platelets 336      Neutrophils % 50.1      Immature Granulocytes %, Automated 0.4      Lymphocytes % 38.4      Monocytes % 6.5      Eosinophils % 4.0      Basophils % 0.6      Neutrophils Absolute 3.48      Immature Granulocytes Absolute, Automated 0.03      Lymphocytes Absolute 2.67      Monocytes Absolute 0.45      Eosinophils Absolute 0.28      Basophils Absolute 0.04     COMPREHENSIVE METABOLIC PANEL - Abnormal    Glucose 101 (*)     Sodium 140      Potassium 4.0      Chloride 105      Bicarbonate 26      Urea Nitrogen 5 (*)     Creatinine 0.70      eGFR >90      Calcium 9.4      Albumin 4.0      Alkaline Phosphatase 62      Total Protein 7.2      AST 17      Bilirubin, Total <0.2      ALT  16      Anion Gap 9     SARS-COV-2 PCR, SYMPTOMATIC - Normal    Coronavirus 2019, PCR Not Detected      Narrative:     This assay has received FDA Emergency Use Authorization (EUA) and is only authorized for the duration of time that circumstances exist to justify the authorization of the emergency use of in vitro diagnostic tests for the detection of SARS-CoV-2 virus and/or diagnosis of COVID-19 infection under section 564(b)(1) of the Act, 21 U.S.C. 360bbb-3(b)(1). This assay is an in vitro diagnostic nucleic acid amplification test for the qualitative detection of SARS-CoV-2 from nasopharyngeal specimens and has been validated for use at [facility]. Negative results do not preclude COVID-19 infections and should not be used as the sole basis for diagnosis, treatment, or other management decisions.     INFLUENZA A AND B PCR - Normal    Flu A Result Not Detected      Flu B Result Not Detected      Narrative:     This assay is an in vitro diagnostic multiplex nucleic acid amplification test for the detection and discrimination of Influenza A & B from nasopharyngeal specimens, and has been validated for use at [facility]. Negative results do not preclude Influenza A/B infections, and should not be used as the sole basis for diagnosis, treatment, or other management decisions. If Influenza A/B and RSV PCR results are negative, testing for Parainfluenza virus, Adenovirus and Metapneumovirus is routinely performed for [facility] pediatric oncology and intensive care inpatients, and is available on other patients by placing an add-on request.   SERIAL TROPONIN, INITIAL ([facility]) - Normal    Troponin T, High Sensitivity <6     TROPONIN T SERIES, HIGH SENSITIVITY (0, 2 HR, 6 HR)    Narrative:     The following orders were created for panel order Troponin T Series, High Sensitivity (0, 2HR, 6HR).  Procedure                               Abnormality         Status                      ---------                               -----------         ------                     Serial Troponin, Initial...[[id number]]  Normal              Final result               Serial Troponin, 2 Hour ...[[id number]]                                                   Please view results for these tests on the individual orders.   URINALYSIS WITH REFLEX MICROSCOPIC AND CULTURE   SERIAL TROPONIN,  2 HOUR ([facility])       Radiology: ordered and independent interpretation performed.  Chest x-ray as interpreted by me shows no large pneumothorax at this time.    ECG/medicine tests: ordered and independent interpretation performed.  EKG as interpreted by me as detailed in ED course.    Lab work as interpreted by me shows no significant CBC abnormalities such as significant leukocytosis or anemia, CMP within normal limits, patient is negative for flu and COVID.    On reassessment after nebulizer and steroid administration patient's lungs are clear to auscultation bilaterally with no appreciable wheeze.    Imaging per radiology shows no acute focal infiltrates at this time and patient with negative troponin.  Patient notes significant improvement of her symptoms and will follow-up outpatient with her primary care provider and use her nebulizer at home as needed.    PLAN AND FOLLOW-UP: Patient counseled on all findings, diagnosis and treatment plan. Patient's questions and concerns addressed. Patient stable, discharged with instructions to follow up with PMD, and to return to ED at any time for worsening symptoms or any other concerns. Patient demonstrates understanding of the findings and the importance of appropriate follow up care.  PATIENT REFERRED TO:  Dora Zamora MD  [id number] [facility]  [place] [address]  [phone]                DISCHARGE MEDICATIONS:  New Prescriptions    No medications on file       Hayden Barber MD  11:11 AM    Attending Emergency Physician  [facility]  [place] [facility]          Procedure  Procedures     Hakeem Richter PA-C  11/14/23 1111       Hayden Barber MD  12/29/23 0053

## 2023-11-14 NOTE — ED TRIAGE NOTES
2 weeks dyspnea, hx of asthma no relief from breathing treatments. Aox4.100% room air but hoarseness noted.

## 2023-11-16 ENCOUNTER — PATIENT OUTREACH (OUTPATIENT)
Dept: PRIMARY CARE | Facility: CLINIC | Age: 43
End: 2023-11-16
Payer: COMMERCIAL

## 2023-11-16 DIAGNOSIS — M19.90 ARTHRITIS: ICD-10-CM

## 2023-11-16 DIAGNOSIS — M06.09 POLYARTHRITIS WITH NEGATIVE RHEUMATOID FACTOR (MULTI): ICD-10-CM

## 2023-11-16 PROCEDURE — 99490 CHRNC CARE MGMT STAFF 1ST 20: CPT | Performed by: STUDENT IN AN ORGANIZED HEALTH CARE EDUCATION/TRAINING PROGRAM

## 2023-11-16 NOTE — PROGRESS NOTES
Patient admitted/discharged from [facility] ED on Nov 14, 2023 with SOB, Asthma Exacerbation. Treatment included Duo-Neb and Solu-Medrol Injection with stated relief in symptoms.  CXR negative for acute cardiopulmonary disease.  /91, P 69, P 18, 02 sat 100%.  No steroid or antibiotic given for home going and patient tried calling PCP yesterday for ED follow up appt.  Since none available and still not well today, she went to local [facility] and given another steroid shot as PO was low at 85%. Transferring to hospital was consideration but she was able to get oxygen level up after few minutes. She is trying to balance work and rest and will call PCP office in few days to schedule appt.  Patient acknowledges probable need for inhaled corticosteroid like Budesonide that she used in past.  She has lapsed relationship with pulmonologist and will consider finding new one next year. Reminder call on calendar for one month from today

## 2023-11-21 RX ORDER — OXYCODONE AND ACETAMINOPHEN 5; 325 MG/1; MG/1
1 TABLET ORAL EVERY 6 HOURS PRN
Qty: 28 TABLET | Refills: 0 | Status: SHIPPED | OUTPATIENT
Start: 2023-11-21 | End: 2023-11-28

## 2023-12-06 DIAGNOSIS — M35.3 POLYMYALGIA RHEUMATICA SYNDROME (MULTI): ICD-10-CM

## 2023-12-06 DIAGNOSIS — G89.29 OTHER CHRONIC PAIN: ICD-10-CM

## 2023-12-06 RX ORDER — OXYCODONE AND ACETAMINOPHEN 5; 325 MG/1; MG/1
1 TABLET ORAL EVERY 6 HOURS PRN
Qty: 5 TABLET | Refills: 0 | Status: CANCELLED | OUTPATIENT
Start: 2023-12-06 | End: 2023-12-13

## 2023-12-06 RX ORDER — HYDROCODONE BITARTRATE AND ACETAMINOPHEN 5; 325 MG/1; MG/1
1 TABLET ORAL EVERY 6 HOURS PRN
Qty: 28 TABLET | Refills: 0 | Status: SHIPPED | OUTPATIENT
Start: 2023-12-06 | End: 2023-12-18 | Stop reason: SDUPTHER

## 2023-12-12 ENCOUNTER — PATIENT OUTREACH (OUTPATIENT)
Dept: PRIMARY CARE | Facility: CLINIC | Age: 43
End: 2023-12-12
Payer: COMMERCIAL

## 2023-12-12 DIAGNOSIS — M19.90 ARTHRITIS: ICD-10-CM

## 2023-12-12 DIAGNOSIS — M06.09 RHEUMATOID ARTHRITIS OF MULTIPLE SITES WITHOUT RHEUMATOID FACTOR (MULTI): ICD-10-CM

## 2023-12-12 PROCEDURE — 99490 CHRNC CARE MGMT STAFF 1ST 20: CPT | Performed by: STUDENT IN AN ORGANIZED HEALTH CARE EDUCATION/TRAINING PROGRAM

## 2023-12-12 NOTE — PROGRESS NOTES
Monthly outreach to patient to discuss recent ED visit at [facility] on 12/9/2023.  She reports flare of RA but I also said she was positive Covid which she had no knowledge of.  Patient unable to speak right now and asks if she can call me back later at my new number.

## 2023-12-13 NOTE — PROGRESS NOTES
Called patient back who is just returning from work and is feeling very fatigued.  She asks about coming to see PCP for something to help her feel more energetic.  Has some lingering cough and congestion but no fever from Covid.  She was supposed to hear back from Dr Garcia' office once blood work done and be schedule for follow up appointment but hasn't been called yet.  Noted to have low Vit D level of 12 and not on any supplements. Offered to schedule with Dr. Zamora but only day off is Tuesdays which earliest I see available is 1/2/2024.  Patient states she will call herself and schedule with both providers.  She is aware that due for Dupixent soon and said daughter, who is nursing student, told her she wasn't administering it deep enough to reach muscle and wants to give next dose.   Explained that this medication is to be given SQ and not IM therefore should NOT be given into muscle.  Reviewed Covid guidelines and encouraged to mask until 12/19/2023 (first day of symptoms 12/8/2023).  Provided new phone number and encouraged to call if any questions before next month.

## 2023-12-18 DIAGNOSIS — G89.29 OTHER CHRONIC PAIN: ICD-10-CM

## 2023-12-18 DIAGNOSIS — M35.3 POLYMYALGIA RHEUMATICA SYNDROME (MULTI): ICD-10-CM

## 2023-12-18 RX ORDER — HYDROCODONE BITARTRATE AND ACETAMINOPHEN 5; 325 MG/1; MG/1
1 TABLET ORAL EVERY 6 HOURS PRN
Qty: 28 TABLET | Refills: 0 | Status: SHIPPED | OUTPATIENT
Start: 2023-12-18 | End: 2023-12-27 | Stop reason: SDUPTHER

## 2023-12-27 DIAGNOSIS — G89.29 OTHER CHRONIC PAIN: ICD-10-CM

## 2023-12-27 DIAGNOSIS — M35.3 POLYMYALGIA RHEUMATICA SYNDROME (MULTI): ICD-10-CM

## 2023-12-28 RX ORDER — HYDROCODONE BITARTRATE AND ACETAMINOPHEN 5; 325 MG/1; MG/1
1 TABLET ORAL EVERY 6 HOURS PRN
Qty: 28 TABLET | Refills: 0 | Status: SHIPPED | OUTPATIENT
Start: 2023-12-28 | End: 2024-01-08 | Stop reason: SDUPTHER

## 2024-01-02 ENCOUNTER — TELEPHONE (OUTPATIENT)
Dept: RHEUMATOLOGY | Facility: CLINIC | Age: 44
End: 2024-01-02
Payer: COMMERCIAL

## 2024-01-02 DIAGNOSIS — G89.29 OTHER CHRONIC PAIN: ICD-10-CM

## 2024-01-02 DIAGNOSIS — M06.09 POLYARTHRITIS WITH NEGATIVE RHEUMATOID FACTOR (MULTI): Primary | ICD-10-CM

## 2024-01-02 NOTE — TELEPHONE ENCOUNTER
Patient called stated that she is having trouble with her hands,on her left hand her pinky and ring finger have been going numb and on her right hand she stated that her thumb and index finger are having spasms. She has an appointment in March and has no labs orders. She also said that her pain medication does not seem to be working she said her dtr, who is going to school to be an  LPN told her that her prescription needs to be written without substitution.  Or maybe the dose needs changed because it is not working for her.

## 2024-01-08 DIAGNOSIS — M35.3 POLYMYALGIA RHEUMATICA SYNDROME (MULTI): ICD-10-CM

## 2024-01-08 DIAGNOSIS — G89.29 OTHER CHRONIC PAIN: ICD-10-CM

## 2024-01-08 RX ORDER — HYDROCODONE BITARTRATE AND ACETAMINOPHEN 5; 325 MG/1; MG/1
1 TABLET ORAL EVERY 6 HOURS PRN
Qty: 28 TABLET | Refills: 0 | Status: SHIPPED | OUTPATIENT
Start: 2024-01-08 | End: 2024-01-15 | Stop reason: SDUPTHER

## 2024-01-08 NOTE — TELEPHONE ENCOUNTER
"PT CALLED TO REQUEST REFILL OF OXYCODONE, ASKING FOR YOU TO WRITE NO SUBSTITUTION ON Rx BECAUSE SUBSTITUTES \"DON'T WORK\".  ALSO ASKING FOR A PHONE APPT TO DISCUSS MEDS BECAUSE SHE DOES NOT FEEL HER MEDS ARE WORKING & WANTS TO INCREASE DOSE.    1/9/24 PT CALLED AGAIN ASKING FOR REFILL & INCREASING DOSE  "

## 2024-01-09 ENCOUNTER — TELEPHONE (OUTPATIENT)
Dept: RHEUMATOLOGY | Facility: CLINIC | Age: 44
End: 2024-01-09
Payer: COMMERCIAL

## 2024-01-09 DIAGNOSIS — M13.0 POLYARTHRITIS: Primary | ICD-10-CM

## 2024-01-09 RX ORDER — METHYLPREDNISOLONE ACETATE 80 MG/ML
80 INJECTION, SUSPENSION INTRA-ARTICULAR; INTRALESIONAL; INTRAMUSCULAR; SOFT TISSUE ONCE
Status: COMPLETED | OUTPATIENT
Start: 2024-01-09 | End: 2024-01-10

## 2024-01-09 RX ORDER — KETOROLAC TROMETHAMINE 30 MG/ML
60 INJECTION, SOLUTION INTRAMUSCULAR; INTRAVENOUS ONCE
Status: COMPLETED | OUTPATIENT
Start: 2024-01-09 | End: 2024-01-10

## 2024-01-09 NOTE — TELEPHONE ENCOUNTER
"PT LEFT  REQUESTING \"MY\" STEROID SHOT & PAIN SHOT BECAUSE SHE NEEDS TO GET ALL THIS GENERALIZED PAIN UNDER CONTROL. PT HAS HAD TORADOL & DEPOMEDROL IN PAST  "

## 2024-01-10 ENCOUNTER — CLINICAL SUPPORT (OUTPATIENT)
Dept: RHEUMATOLOGY | Facility: CLINIC | Age: 44
End: 2024-01-10
Payer: COMMERCIAL

## 2024-01-10 DIAGNOSIS — M06.09 POLYARTHRITIS WITH NEGATIVE RHEUMATOID FACTOR (MULTI): ICD-10-CM

## 2024-01-10 PROBLEM — H69.90 EUSTACHIAN TUBE DYSFUNCTION: Status: ACTIVE | Noted: 2024-01-10

## 2024-01-10 PROBLEM — J31.0 CHRONIC RHINITIS: Status: ACTIVE | Noted: 2024-01-10

## 2024-01-10 PROBLEM — D86.0 SARCOIDOSIS OF LUNG (MULTI): Status: ACTIVE | Noted: 2024-01-10

## 2024-01-10 PROBLEM — R29.6 FREQUENT FALLS: Status: ACTIVE | Noted: 2024-01-10

## 2024-01-10 PROBLEM — G47.33 OBSTRUCTIVE SLEEP APNEA: Status: ACTIVE | Noted: 2024-01-10

## 2024-01-10 PROBLEM — M17.12 PATELLOFEMORAL ARTHRITIS OF LEFT KNEE: Status: ACTIVE | Noted: 2024-01-10

## 2024-01-10 PROBLEM — S83.92XA SPRAIN OF LEFT KNEE: Status: ACTIVE | Noted: 2024-01-10

## 2024-01-10 PROBLEM — F41.8 ANXIETY WITH DEPRESSION: Status: ACTIVE | Noted: 2024-01-10

## 2024-01-10 PROBLEM — K21.9 LARYNGOPHARYNGEAL REFLUX (LPR): Status: ACTIVE | Noted: 2024-01-10

## 2024-01-10 PROBLEM — M54.42 LUMBAGO WITH SCIATICA, LEFT SIDE: Status: ACTIVE | Noted: 2024-01-10

## 2024-01-10 PROBLEM — M25.561 ARTHRALGIA OF RIGHT KNEE: Status: ACTIVE | Noted: 2024-01-10

## 2024-01-10 PROBLEM — M17.12 OSTEOARTHRITIS OF LEFT KNEE: Status: ACTIVE | Noted: 2024-01-10

## 2024-01-10 PROBLEM — G89.4 CHRONIC PAIN SYNDROME: Status: ACTIVE | Noted: 2024-01-10

## 2024-01-10 PROBLEM — J30.89 ALLERGIC RHINITIS DUE TO AMERICAN HOUSE DUST MITE: Status: ACTIVE | Noted: 2021-02-05

## 2024-01-10 PROBLEM — J45.909 ASTHMA (HHS-HCC): Status: ACTIVE | Noted: 2024-01-10

## 2024-01-10 PROBLEM — J30.2 SEASONAL ALLERGIES: Status: ACTIVE | Noted: 2024-01-10

## 2024-01-10 PROBLEM — J34.3 HYPERTROPHY OF NASAL TURBINATES: Status: ACTIVE | Noted: 2024-01-10

## 2024-01-10 PROBLEM — R13.10 ABNORMAL SWALLOWING: Status: ACTIVE | Noted: 2024-01-10

## 2024-01-10 PROBLEM — R26.81 GAIT INSTABILITY: Status: ACTIVE | Noted: 2024-01-10

## 2024-01-10 PROBLEM — E55.9 VITAMIN D DEFICIENCY: Status: ACTIVE | Noted: 2024-01-10

## 2024-01-10 PROBLEM — K29.50 MILD CHRONIC GASTRITIS: Status: ACTIVE | Noted: 2024-01-10

## 2024-01-10 PROBLEM — R40.0 DAYTIME SOMNOLENCE: Status: ACTIVE | Noted: 2024-01-10

## 2024-01-10 PROBLEM — K90.49 FOOD INTOLERANCE IN ADULT: Status: ACTIVE | Noted: 2024-01-10

## 2024-01-10 PROBLEM — S83.002A SUBLUXATION OF LEFT PATELLA: Status: ACTIVE | Noted: 2024-01-10

## 2024-01-10 PROBLEM — R06.89 OTHER ABNORMALITIES OF BREATHING: Status: ACTIVE | Noted: 2024-01-10

## 2024-01-10 PROBLEM — R06.00 DYSPNEA, UNSPECIFIED: Status: ACTIVE | Noted: 2024-01-10

## 2024-01-10 PROBLEM — K58.9 IRRITABLE BOWEL SYNDROME: Status: ACTIVE | Noted: 2024-01-10

## 2024-01-10 PROBLEM — K59.00 CONSTIPATION: Status: ACTIVE | Noted: 2024-01-10

## 2024-01-10 PROBLEM — M05.10: Status: ACTIVE | Noted: 2024-01-10

## 2024-01-10 PROCEDURE — 96372 THER/PROPH/DIAG INJ SC/IM: CPT | Performed by: INTERNAL MEDICINE

## 2024-01-10 PROCEDURE — 2500000004 HC RX 250 GENERAL PHARMACY W/ HCPCS (ALT 636 FOR OP/ED): Performed by: INTERNAL MEDICINE

## 2024-01-10 RX ORDER — NAPROXEN 500 MG/1
TABLET ORAL EVERY 12 HOURS
COMMUNITY
Start: 2022-06-28 | End: 2024-02-06

## 2024-01-10 RX ORDER — METRONIDAZOLE 500 MG/1
TABLET ORAL
COMMUNITY
Start: 2023-09-15 | End: 2024-02-06 | Stop reason: ALTCHOICE

## 2024-01-10 RX ORDER — BUDESONIDE 0.5 MG/2ML
1 INHALANT ORAL 2 TIMES DAILY
COMMUNITY
Start: 2021-02-23

## 2024-01-10 RX ORDER — MOMETASONE FUROATE AND FORMOTEROL FUMARATE DIHYDRATE 200; 5 UG/1; UG/1
2 AEROSOL RESPIRATORY (INHALATION) 2 TIMES DAILY
COMMUNITY
Start: 2022-06-03

## 2024-01-10 RX ORDER — FOLIC ACID 1 MG/1
TABLET ORAL EVERY 24 HOURS
COMMUNITY
Start: 2021-11-23 | End: 2024-02-06 | Stop reason: SDUPTHER

## 2024-01-10 RX ORDER — EPINEPHRINE 0.3 MG/.3ML
INJECTION INTRAMUSCULAR
COMMUNITY
Start: 2022-07-11

## 2024-01-10 RX ORDER — VALACYCLOVIR HYDROCHLORIDE 1 G/1
1000 TABLET, FILM COATED ORAL DAILY
COMMUNITY
Start: 2023-07-21 | End: 2024-02-06 | Stop reason: ALTCHOICE

## 2024-01-10 RX ORDER — HYDROCODONE BITARTRATE AND ACETAMINOPHEN 5; 325 MG/1; MG/1
1 TABLET ORAL EVERY 6 HOURS PRN
Qty: 20 TABLET | Refills: 0 | Status: SHIPPED | OUTPATIENT
Start: 2024-01-10 | End: 2024-01-17

## 2024-01-10 RX ORDER — MINERAL OIL
ENEMA (ML) RECTAL
COMMUNITY
Start: 2016-09-03

## 2024-01-10 RX ORDER — ETANERCEPT 50 MG/ML
SOLUTION SUBCUTANEOUS
COMMUNITY
End: 2024-02-06 | Stop reason: WASHOUT

## 2024-01-10 RX ADMIN — METHYLPREDNISOLONE ACETATE 80 MG: 80 INJECTION, SUSPENSION INTRA-ARTICULAR; INTRALESIONAL; INTRAMUSCULAR; SOFT TISSUE at 16:30

## 2024-01-10 RX ADMIN — KETOROLAC TROMETHAMINE 60 MG: 60 INJECTION, SOLUTION INTRAMUSCULAR at 16:30

## 2024-01-11 ENCOUNTER — PATIENT OUTREACH (OUTPATIENT)
Dept: PRIMARY CARE | Facility: CLINIC | Age: 44
End: 2024-01-11
Payer: COMMERCIAL

## 2024-01-11 DIAGNOSIS — M06.09 SERONEGATIVE RHEUMATOID ARTHRITIS OF MULTIPLE SITES (MULTI): ICD-10-CM

## 2024-01-11 DIAGNOSIS — M19.90 CHRONIC OSTEOARTHRITIS: ICD-10-CM

## 2024-01-11 PROCEDURE — 99490 CHRNC CARE MGMT STAFF 1ST 20: CPT | Performed by: STUDENT IN AN ORGANIZED HEALTH CARE EDUCATION/TRAINING PROGRAM

## 2024-01-11 NOTE — PROGRESS NOTES
Spoke briefly with patient who is at work and asks if she can call me back later.     Vit D Hydroxy 12 (31-50) no supplements  Dr. Garcia 3/14  Covid cough recovered?  CPE 6/21/2023

## 2024-01-15 DIAGNOSIS — M35.3 POLYMYALGIA RHEUMATICA SYNDROME (MULTI): ICD-10-CM

## 2024-01-15 DIAGNOSIS — G89.29 OTHER CHRONIC PAIN: ICD-10-CM

## 2024-01-15 NOTE — TELEPHONE ENCOUNTER
Patient also needs the paperwork sent to her work for her accommodations. She stated the are getting ready to fire her

## 2024-01-19 RX ORDER — HYDROCODONE BITARTRATE AND ACETAMINOPHEN 5; 325 MG/1; MG/1
1 TABLET ORAL EVERY 6 HOURS PRN
Qty: 28 TABLET | Refills: 0 | Status: SHIPPED | OUTPATIENT
Start: 2024-01-19 | End: 2024-01-26 | Stop reason: SDUPTHER

## 2024-01-24 ENCOUNTER — TELEPHONE (OUTPATIENT)
Dept: PRIMARY CARE | Facility: CLINIC | Age: 44
End: 2024-01-24
Payer: COMMERCIAL

## 2024-01-24 NOTE — PROGRESS NOTES
Able to connect with patient who happily reports resolution of Covid cough and other symptoms.  Remains gainfully employed but does need some designated breaks outside of her regular ones to take medications or inhalers.  She was told by Dr. Garcia' office that it was mailed in Dec but never received.  Her appt with Rheumatology was moved up to 2/6 but her labs from Sept most likely are out of date.  At patient's request, I sent message to Dr. Garcia to inquire about need for new labs and accomodation letter. Her severe asthma symptoms are managed with Dupixant and if she can get twice a month Toradol/Depmedrol shot.  I explained long term use of steroid can cause other complications but she verbalized being more worried about trying to maintain her activity level for work and family.  CM will follow up 1 month

## 2024-01-25 DIAGNOSIS — M35.3 POLYMYALGIA RHEUMATICA SYNDROME (MULTI): ICD-10-CM

## 2024-01-25 DIAGNOSIS — G89.29 OTHER CHRONIC PAIN: ICD-10-CM

## 2024-01-25 NOTE — TELEPHONE ENCOUNTER
This was already done a couple of days ago and patient should have gotten a call by now to go over it prior to the fax being sent.

## 2024-01-26 RX ORDER — HYDROCODONE BITARTRATE AND ACETAMINOPHEN 5; 325 MG/1; MG/1
1 TABLET ORAL EVERY 6 HOURS PRN
Qty: 28 TABLET | Refills: 0 | Status: SHIPPED | OUTPATIENT
Start: 2024-01-26 | End: 2024-02-02 | Stop reason: SDUPTHER

## 2024-01-26 NOTE — TELEPHONE ENCOUNTER
"PT CALLED TO REQUEST REFILL OF \"PAIN MEDS\". STATES SHE HAD TO TAKE THE INDOCET LAST NIGHT & IT WORKED FINE.  "

## 2024-02-02 DIAGNOSIS — G89.29 OTHER CHRONIC PAIN: ICD-10-CM

## 2024-02-02 DIAGNOSIS — M35.3 POLYMYALGIA RHEUMATICA SYNDROME (MULTI): ICD-10-CM

## 2024-02-02 PROBLEM — M22.2X1 PATELLOFEMORAL DISORDERS, RIGHT KNEE: Status: ACTIVE | Noted: 2023-05-05

## 2024-02-02 PROBLEM — M76.52 PATELLAR TENDINITIS, LEFT KNEE: Status: ACTIVE | Noted: 2022-03-25

## 2024-02-02 PROBLEM — R39.15 URGENCY OF URINATION: Status: ACTIVE | Noted: 2023-04-15

## 2024-02-02 PROBLEM — K75.4 AUTOIMMUNE HEPATITIS (MULTI): Status: ACTIVE | Noted: 2022-07-11

## 2024-02-02 PROBLEM — M32.10 SYSTEMIC LUPUS ERYTHEMATOSUS, ORGAN OR SYSTEM INVOLVEMENT UNSPECIFIED (MULTI): Status: ACTIVE | Noted: 2022-07-11

## 2024-02-02 RX ORDER — OXYCODONE AND ACETAMINOPHEN 5; 325 MG/1; MG/1
1 TABLET ORAL EVERY 6 HOURS PRN
COMMUNITY
End: 2024-02-06 | Stop reason: WASHOUT

## 2024-02-02 RX ORDER — HYDROCODONE BITARTRATE AND ACETAMINOPHEN 5; 325 MG/1; MG/1
1 TABLET ORAL EVERY 6 HOURS PRN
Qty: 28 TABLET | Refills: 0 | Status: SHIPPED | OUTPATIENT
Start: 2024-02-02 | End: 2024-02-06 | Stop reason: WASHOUT

## 2024-02-06 ENCOUNTER — OFFICE VISIT (OUTPATIENT)
Dept: RHEUMATOLOGY | Facility: CLINIC | Age: 44
End: 2024-02-06
Payer: COMMERCIAL

## 2024-02-06 VITALS
SYSTOLIC BLOOD PRESSURE: 124 MMHG | HEART RATE: 54 BPM | DIASTOLIC BLOOD PRESSURE: 74 MMHG | BODY MASS INDEX: 28.23 KG/M2 | HEIGHT: 64 IN | WEIGHT: 165.34 LBS | OXYGEN SATURATION: 98 %

## 2024-02-06 DIAGNOSIS — E55.9 VITAMIN D DEFICIENCY: ICD-10-CM

## 2024-02-06 DIAGNOSIS — G89.29 OTHER CHRONIC PAIN: ICD-10-CM

## 2024-02-06 DIAGNOSIS — M06.09 POLYARTHRITIS WITH NEGATIVE RHEUMATOID FACTOR (MULTI): Primary | ICD-10-CM

## 2024-02-06 DIAGNOSIS — Z79.52 LONG TERM (CURRENT) USE OF SYSTEMIC STEROIDS: ICD-10-CM

## 2024-02-06 DIAGNOSIS — M35.3 POLYMYALGIA RHEUMATICA SYNDROME (MULTI): ICD-10-CM

## 2024-02-06 PROCEDURE — 2500000004 HC RX 250 GENERAL PHARMACY W/ HCPCS (ALT 636 FOR OP/ED): Performed by: INTERNAL MEDICINE

## 2024-02-06 PROCEDURE — 99215 OFFICE O/P EST HI 40 MIN: CPT | Performed by: INTERNAL MEDICINE

## 2024-02-06 PROCEDURE — 96372 THER/PROPH/DIAG INJ SC/IM: CPT | Performed by: INTERNAL MEDICINE

## 2024-02-06 RX ORDER — HYDROCODONE BITARTRATE AND ACETAMINOPHEN 7.5; 325 MG/1; MG/1
1 TABLET ORAL EVERY 6 HOURS PRN
Qty: 28 TABLET | Refills: 0 | Status: SHIPPED | OUTPATIENT
Start: 2024-02-06 | End: 2024-02-09 | Stop reason: SDUPTHER

## 2024-02-06 RX ORDER — SULFASALAZINE 500 MG/1
500 TABLET, DELAYED RELEASE ORAL 2 TIMES DAILY
Qty: 180 TABLET | Refills: 3 | Status: SHIPPED | OUTPATIENT
Start: 2024-02-06

## 2024-02-06 RX ORDER — METHYLPREDNISOLONE ACETATE 80 MG/ML
80 INJECTION, SUSPENSION INTRA-ARTICULAR; INTRALESIONAL; INTRAMUSCULAR; SOFT TISSUE ONCE
Status: COMPLETED | OUTPATIENT
Start: 2024-02-06 | End: 2024-02-06

## 2024-02-06 RX ORDER — GOLIMUMAB 50 MG/.5ML
50 INJECTION, SOLUTION SUBCUTANEOUS
Qty: 0.5 ML | Refills: 11 | Status: SHIPPED | OUTPATIENT
Start: 2024-02-06 | End: 2024-03-13 | Stop reason: ALTCHOICE

## 2024-02-06 RX ORDER — TIMOLOL MALEATE 5 MG/ML
SOLUTION/ DROPS OPHTHALMIC
COMMUNITY
Start: 2024-02-03 | End: 2024-06-01 | Stop reason: WASHOUT

## 2024-02-06 RX ORDER — CYCLOBENZAPRINE HCL 10 MG
10 TABLET ORAL AS NEEDED
Qty: 90 TABLET | Refills: 3 | Status: SHIPPED | OUTPATIENT
Start: 2024-02-06

## 2024-02-06 RX ORDER — FOLIC ACID 1 MG/1
1 TABLET ORAL DAILY
Qty: 90 TABLET | Refills: 3 | Status: SHIPPED | OUTPATIENT
Start: 2024-02-06

## 2024-02-06 RX ORDER — ERGOCALCIFEROL 1.25 MG/1
50000 CAPSULE ORAL
Qty: 12 CAPSULE | Refills: 2 | Status: SHIPPED | OUTPATIENT
Start: 2024-02-06 | End: 2024-05-06

## 2024-02-06 RX ADMIN — METHYLPREDNISOLONE ACETATE 80 MG: 80 INJECTION, SUSPENSION INTRA-ARTICULAR; INTRALESIONAL; INTRAMUSCULAR; SOFT TISSUE at 10:52

## 2024-02-06 ASSESSMENT — ENCOUNTER SYMPTOMS
OCCASIONAL FEELINGS OF UNSTEADINESS: 1
LOSS OF SENSATION IN FEET: 0
DEPRESSION: 1

## 2024-02-06 ASSESSMENT — ROUTINE ASSESSMENT OF PATIENT INDEX DATA (RAPID3)
ON A SCALE OF ONE TO TEN, CONSIDERING ALL THE WAYS IN WHICH ILLNESS AND HEALTH CONDITIONS MAY AFFECT YOU AT THIS TIME, PLEASE INDICATE BELOW HOW YOU ARE DOING:: 8
WALK_FLAT_GROUND: WITH MUCH DIFFICULTY
PICK_CLOTHES_OFF_FLOOR: WITH MUCH DIFFICULTY
TURN_FAUCETS_OFF: WITH MUCH DIFFICULTY
WASH_DRY_BODY: WITH MUCH DIFFICULTY
WALK_KILOMETERS: WITH MUCH DIFFICULTY
TOTAL RAPID3 SCORE: 22.7
SEVERITY_SCORE: 0
ON A SCALE OF ONE TO TEN, HOW MUCH PAIN HAVE YOU HAD BECAUSE OF YOUR CONDITION OVER THE PAST WEEK?: 8
WEIGHTED_TOTAL_SCORE: 7.57
GOOD_NIGHTS_SLEEP: WITH MUCH DIFFICULTY
SUM OF QUESTIONS A TO J: 20
DRESS_YOURSELF: WITH MUCH DIFFICULTY
ON A SCALE OF ONE TO TEN, CONSIDERING ALL THE WAYS IN WHICH ILLNESS AND HEALTH CONDITIONS MAY AFFECT YOU AT THIS TIME, PLEASE INDICATE BELOW HOW YOU ARE DOING:: 8
FN_SCORE: 6.7
FEELINGS_ANXIETY_NERVOUS: WITH MUCH DIFFICULTY
IN_OUT_BED: WITH MUCH DIFFICULTY
LIFT_CUP_TO_MOUTH: WITH MUCH DIFFICULTY
PARTIPATE_RECREATIONAL_ACTIVITIES: WITH MUCH DIFFICULTY
FEELINGS_DEPRESSION: WITH MUCH DIFFICULTY
IN_OUT_TRANSPORT: WITH MUCH DIFFICULTY
ON A SCALE OF ONE TO TEN, HOW MUCH PAIN HAVE YOU HAD BECAUSE OF YOUR CONDITION OVER THE PAST WEEK?: 8

## 2024-02-06 ASSESSMENT — PATIENT HEALTH QUESTIONNAIRE - PHQ9
10. IF YOU CHECKED OFF ANY PROBLEMS, HOW DIFFICULT HAVE THESE PROBLEMS MADE IT FOR YOU TO DO YOUR WORK, TAKE CARE OF THINGS AT HOME, OR GET ALONG WITH OTHER PEOPLE: EXTREMELY DIFFICULT
SUM OF ALL RESPONSES TO PHQ9 QUESTIONS 1 AND 2: 1
2. FEELING DOWN, DEPRESSED OR HOPELESS: SEVERAL DAYS
1. LITTLE INTEREST OR PLEASURE IN DOING THINGS: NOT AT ALL

## 2024-02-06 ASSESSMENT — PAIN SCALES - GENERAL: PAINLEVEL_OUTOF10: 4

## 2024-02-06 ASSESSMENT — PAIN - FUNCTIONAL ASSESSMENT: PAIN_FUNCTIONAL_ASSESSMENT: 0-10

## 2024-02-06 NOTE — PROGRESS NOTES
[facility]/  Rheumatology  [facility], [address]  [place] [address]  Phone: [phone]  Fax: [phone]    Rheumatology Progress Note 2/6/24    Susan Ford is a 43 y.o. female here for   Chief Complaint   Patient presents with    Follow-up     labs       Last Visit: 7/12/22    Rheum Hx      Previous Tx    Health Maintenance  DXA- none  Malignancy Hx- none  Immunization History   Administered Date(s) Administered    Flu vaccine (IIV4), preservative free *Check age/dose* 09/17/2020    Influenza, injectable, quadrivalent 10/14/2020    PPD Test 09/20/2019    Pneumococcal conjugate vaccine, 20-valent (PREVNAR 20) 06/21/2023    Td (adult) 02/26/2011    Tdap vaccine, age 7 year and older (BOOSTRIX, ADACEL) 09/17/2020, 11/10/2020          Past Medical History:   Diagnosis Date    Asthma     Cervicalgia     Chronic pain syndrome     Frequent falls     GERD (gastroesophageal reflux disease)     Knee pain, right     Snoring     Snoring    UTI (urinary tract infection)       Past Surgical History:   Procedure Laterality Date    APPENDECTOMY  11/21/2013    Appendectomy    HERNIA REPAIR  11/21/2013    Hernia Repair    PATELLAR TENDON REPAIR Left     TUBAL LIGATION  11/21/2013    Tubal Ligation    WISDOM TOOTH EXTRACTION        Current Outpatient Medications   Medication Sig Dispense Refill    albuterol 2.5 mg /3 mL (0.083 %) nebulizer solution Take 3 mL (2.5 mg) by nebulization every 6 hours if needed for wheezing.      benzonatate (Tessalon) 100 mg capsule Take 1 capsule (100 mg) by mouth 3 times a day as needed for cough. Do not crush or chew.      budesonide (Pulmicort) 0.5 mg/2 mL nebulizer solution Inhale 4 mL (1 mg) twice a day.      dupilumab (Dupixent Pen) 300 mg/2 mL injection Inject 2 mL (300 mg) under the skin every 14 (fourteen) days.      EPINEPHrine (EpiPen 2-Milan) 0.3 mg/0.3 mL injection syringe as directed Injection as directed for 1 days      fexofenadine (Allegra)  "180 mg tablet       fluticasone (Flonase) 50 mcg/actuation nasal spray Administer 1 spray into each nostril once daily. Shake gently. Before first use, prime pump. After use, clean tip and replace cap.      meclizine (Antivert) 25 mg tablet Take 1 tablet (25 mg) by mouth 2 times a day.      mometasone-formoterol (Dulera) 200-5 mcg/actuation inhaler Inhale 2 puffs twice a day.      Vienva 0.1-20 mg-mcg tablet       albuterol (Ventolin HFA) 90 mcg/actuation inhaler Inhale 1 puff every 4 hours if needed for wheezing (inhale 1-2 puffs every 4 hours as needed.). 18 g 2    cyclobenzaprine (Flexeril) 10 mg tablet Take 1 tablet (10 mg) by mouth if needed for muscle spasms. 90 tablet 3    ergocalciferol (Vitamin D-2) 1.25 MG (46596 UT) capsule Take 1 capsule (50,000 Units) by mouth 1 (one) time per week. 12 capsule 2    folic acid (Folvite) 1 mg tablet Take 1 tablet (1 mg) by mouth once daily. 90 tablet 3    oxyCODONE-acetaminophen (Percocet) 7.5-325 mg tablet Take 1 tablet by mouth every 6 hours if needed for severe pain (7 - 10) for up to 7 days. 28 tablet 0    oxyCODONE-acetaminophen (Percocet) 7.5-325 mg tablet Take 1 tablet by mouth every 6 hours if needed for severe pain (7 - 10) for up to 3 days. 12 tablet 0    sulfaSALAzine (Azulfidine) 500 mg DR tablet Take 1 tablet (500 mg) by mouth 2 times a day. Do not crush, chew, or split. 180 tablet 3    traMADol (Ultram) 50 mg tablet Take 1 tablet (50 mg) by mouth 2 times a day. 60 tablet 0     No current facility-administered medications for this visit.      Allergies   Allergen Reactions    Penicillin Shortness of breath    Penicillins Shortness of breath, Anaphylaxis and Swelling     Reaction was when patient was a child. Pts mother told her about it.    Bee Pollen Unknown    House Dust Unknown        Vitals:    02/06/24 0900   BP: 124/74   Pulse: 54   SpO2: 98%   Weight: 75 kg (165 lb 5.5 oz)   Height: 1.626 m (5' 4\")     Pain Assessment Pain Score: 4 (with pain " medication), Pain Location: Generalized     Rapid 3  Function Score (FN): 6.7  Pain Score (PN) (0-10): 8  Patient Global (PTGL) (0-10): 8  Rapid3 Score: 22.7  RAPID3 Weighted Score: 7.57     Workup    Component      Latest Ref Rng 9/12/2023   Differential Type AUTO DIFF…    Immature Granulocytes %, Automated, Collection      0.0 - 1.0 % 0.20    Neutrophils %, Collection      50 - 70 % 28.80 (L)    Lymphocytes %, Manual      20 - 40 % 57.50 (H)    Monocytes %, Manual      0 - 8 % 5.50    Eosinophil %, Collection      0 - 3 % 6.50 (H)    Basophils %      0 - 1 % 1.50 (H)    Immature Granulocytes Absolute, Automated      0.0 - 0.1 K/UL 0.01    Neutrophils Absolute      1.8 - 7.7 K/UL 1.37 (L)    Lymphocytes Absolute      1.2 - 3.2 K/UL 2.73    Monocytes Absolute      0 - 0.8 K/UL 0.26    Eosinophils Absolute      0 - 0.45 K/UL 0.31    Basophils Absolute      0.00 - 0.22 K/UL 0.07    WBC      4.5 - 11.0 K/UL 4.8    RBC      4.0 - 4.9 M/UL 4.88    HEMOGLOBIN      12.0 - 15.0 GM/DL 13.1    HEMATOCRIT      36 - 44 % 42.0    MCV      80 - 100 FL 86.1    MCH      26 - 34 PG 26.8    MCHC      31 - 37 % 31.2    RDW-SD      37.0 - 54.0 FL 37.7    RDW-CV      11.7 - 15.0 % 11.9    Platelets      150 - 450 K/    MEAN PLATELET VOLUME      7.0 - 12.6 CU 10.7    nRBC      0 /100 WBC 0    ABSOLUTE NEUTROPHIL CALCULATED      K/UL 1.37    Calcium      8.5 - 10.4 MG/DL 9.9    AST      5 - 40 U/L 19    Alkaline Phosphatase      35 - 125 U/L 67    Bilirubin Total      0.1 - 1.2 MG/DL 0.4    Total Protein      5.9 - 7.9 G/DL 7.6    Albumin      3.5 - 5.0 GM/DL 4.5    Globulin, Total      1.9 - 3.7 G/DL 3.1    A/G Ratio      1.5 - 3.0 RATIO 1.5    SODIUM      133 - 145 MMOL/L 140    POTASSIUM      3.4 - 5.1 MMOL/L 4.3    CHLORIDE      97 - 107 MMOL/L 105    Bicarbonate      24 - 31 MMOL/L 24    Anion Gap      0 - 19 MMOL/L 11    Blood Urea Nitrogen      8 - 25 MG/DL 8    Creatinine      0.4 - 1.6 MG/DL 0.8    Urea Nitrogen/Creatinine  Ratio      8 - 21 RATIO 10.0    GLUCOSE      65 - 99 MG/DL 83    ALT      5 - 40 U/L 17    ESTIMATED GFR      mL/min/1.73 m2 94    Microscopic AUTOMATIC MICROSCOPIC URINES    WBC, Urine      0 - 3 /HPF 2    RBC, Urine      0 - 3 /HPF 2    Bacteria, Urine NEGATIVE    Squamous Epithelial Cells, Urine      /HPF FEW    Hyaline Casts, Urine      /LPF 3    Color, Urine PALE YELLOW    Appearance, Urine CLEAR    Specific Gravity, Urine      1.005 - 1.030  1.018    pH, Urine      4.6 - 8.0  6.0    Leukocyte Esterase, Urine      NEG  NEGATIVE    Nitrite, Urine      NEG  NEGATIVE    Protein, Urine      NEG mg/dL NEGATIVE    Glucose, Urine      NEG mg/dL NEGATIVE    Ketones, Urine      NEG  NEGATIVE    Urobilinogen, Urine      0 - 1.0 MG/DL NORMAL    Bilirubin, Urine      NEG  NEGATIVE    Blood, Urine      NEG  SMALL !    Urine Culture CULTURE NOT INDICATED…    T Spot Result Negative…    Panel A Spot Count 1…    Panel B Spot Count 2…    NIL(NEG) Control Spot Count Passed…    POS Control Spot Count Passed…    CKMB      0.0 - 5.0 NG/ML 2.0    CK-MB Index      0 - 3.5 % 1.3    Angiotensin 1 Conv 82…    Creatine Kinase      24 - 195 U/L 160    C-Reactive Protein      0 - 2.0 MG/DL 0.3    Sed Rate      0 - 20 MM/HR 38 (H)    Vectrada Result Results will be reported to the ordering physician.…    Vitamin D, 25-Hydroxy, Total      31 - 100 ng/mL 12 (L)    Vit D, 1,25-Dihydroxy 46.9…        Assessment/Plan  1. Polyarthritis with negative rheumatoid factor (CMS/[facility])    2. Vitamin D deficiency    3. Other chronic pain    4. Polymyalgia rheumatica syndrome (CMS/[facility])    5. Long term (current) use of systemic steroids       Orders Placed This Encounter   Procedures    XR DEXA bone density axial skeleton w VFA    CBC and Auto Differential    Comprehensive Metabolic Panel    C-Reactive Protein    Creatine Kinase    Sedimentation Rate    Urinalysis with Reflex Culture and Microscopic    Vitamin D 1,25 Dihydroxy (for eval of hypercalcemia)     Vitamin D 25-Hydroxy,Total (for eval of Vitamin D levels)    Interleukin-6    Vectra; [facility]; 406244 - Miscellaneous Test    14.3.3; [facility]; 187794 - Miscellaneous Test    Angiotensin Converting Enzyme      Since last appt, adherent and tolerating SSZ 1 g  Not on biologic due to   Pain med helps function.   Working from home.  Had layringitis, then COVID followed by RSV. Just recovering.  Elbow numbness w  Triggering, cramping  Dupixent helping with asthma so not requiring budesonide as much.  Denies any recent or current infection.  Not on any NSAIDs or glucocorticoids.        ROS+ for wt loss, fatigue, difficulty swallowing, ONOFRE, abd pain, joint pain/swelling/stiffness, back pain, raynaud's without pitting/ulceration, weakness legs and arms, numbness/tingling, depression/anxiety.  Rapid 3 consistent with high severity.  Labs reviewed  D/w pt tx options Advised of possible side effects and importance of monitoring.   All questions answered.  Patient to follow up with primary care provider regarding all other medical issues not addressed today and for medical chart updating.    Enedina Hernandez MD      Patient Care Team:  Dora Zamora MD as PCP - General (Family Medicine)  Evon Irizarry DO as PCP - [facility] Medicaid PCP  Deborah Leija as Care Manager (Case Management)  Enedina Hernandez MD as Consulting Physician (Rheumatology)

## 2024-02-07 ENCOUNTER — TELEPHONE (OUTPATIENT)
Dept: RESPIRATORY THERAPY | Facility: CLINIC | Age: 44
End: 2024-02-07
Payer: COMMERCIAL

## 2024-02-07 ENCOUNTER — SPECIALTY PHARMACY (OUTPATIENT)
Dept: PHARMACY | Facility: CLINIC | Age: 44
End: 2024-02-07

## 2024-02-07 NOTE — TELEPHONE ENCOUNTER
Called patient to assist with scheduling. Made her aware that Dr. Guzman is no longer seeing patients at [facility]. I made her aware that he is seeing patients at [facility] or [place]. Patient states that they is to far and she will find another pulmonary doctor closer to her.

## 2024-02-07 NOTE — TELEPHONE ENCOUNTER
----- Message from Susan Ford sent at 2/7/2024 10:53 AM EST -----  Regarding: Appointment   Contact: [phone]  Hello I am in need of an appointment at the [facility] any day after 330

## 2024-02-09 DIAGNOSIS — M06.09 POLYARTHRITIS WITH NEGATIVE RHEUMATOID FACTOR (MULTI): ICD-10-CM

## 2024-02-09 DIAGNOSIS — G89.29 OTHER CHRONIC PAIN: ICD-10-CM

## 2024-02-09 NOTE — TELEPHONE ENCOUNTER
PT LEFT VM THAT THERE IS A PROBLEM GETTING HER HYDROCODONE. LOOKS  LIKE IT WAS SENT TO [facility]. CAN YOU RE-SEND THE HYDROCODONE TO [facility]

## 2024-02-12 RX ORDER — HYDROCODONE BITARTRATE AND ACETAMINOPHEN 7.5; 325 MG/1; MG/1
1 TABLET ORAL EVERY 6 HOURS PRN
Qty: 28 TABLET | Refills: 0 | Status: SHIPPED | OUTPATIENT
Start: 2024-02-12 | End: 2024-02-21 | Stop reason: SDUPTHER

## 2024-02-21 DIAGNOSIS — G89.29 OTHER CHRONIC PAIN: ICD-10-CM

## 2024-02-21 DIAGNOSIS — M06.09 POLYARTHRITIS WITH NEGATIVE RHEUMATOID FACTOR (MULTI): ICD-10-CM

## 2024-02-21 RX ORDER — HYDROCODONE BITARTRATE AND ACETAMINOPHEN 7.5; 325 MG/1; MG/1
1 TABLET ORAL EVERY 6 HOURS PRN
Qty: 28 TABLET | Refills: 0 | Status: SHIPPED | OUTPATIENT
Start: 2024-02-21 | End: 2024-02-26 | Stop reason: SDUPTHER

## 2024-02-26 ENCOUNTER — TELEPHONE (OUTPATIENT)
Dept: PRIMARY CARE | Facility: CLINIC | Age: 44
End: 2024-02-26

## 2024-02-26 ENCOUNTER — PATIENT OUTREACH (OUTPATIENT)
Dept: PRIMARY CARE | Facility: CLINIC | Age: 44
End: 2024-02-26
Payer: COMMERCIAL

## 2024-02-26 DIAGNOSIS — G89.29 OTHER CHRONIC PAIN: ICD-10-CM

## 2024-02-26 DIAGNOSIS — M06.09 RHEUMATOID ARTHRITIS OF MULTIPLE SITES WITH NEGATIVE RHEUMATOID FACTOR (MULTI): ICD-10-CM

## 2024-02-26 DIAGNOSIS — M06.09 POLYARTHRITIS WITH NEGATIVE RHEUMATOID FACTOR (MULTI): ICD-10-CM

## 2024-02-26 DIAGNOSIS — M19.90 ARTHRITIS: ICD-10-CM

## 2024-02-26 PROCEDURE — 99490 CHRNC CARE MGMT STAFF 1ST 20: CPT | Performed by: STUDENT IN AN ORGANIZED HEALTH CARE EDUCATION/TRAINING PROGRAM

## 2024-02-26 RX ORDER — HYDROCODONE BITARTRATE AND ACETAMINOPHEN 7.5; 325 MG/1; MG/1
1 TABLET ORAL EVERY 6 HOURS PRN
Qty: 28 TABLET | Refills: 0 | Status: SHIPPED | OUTPATIENT
Start: 2024-02-26 | End: 2024-03-04

## 2024-02-26 NOTE — TELEPHONE ENCOUNTER
Pt called saying Angel Kessler had not received her Hydrocodone-acetaminophen 7.5-325mg rx that was sent on 02/21/2024. When you can, can it be sent to [facility] instead. Pharmacy updated. Please advise.     Omar Khan MA    
Rx sent  
pt has inflammation in tonsilar area accompanied by pain. No stridor, or other indications that airway is not intact. pt able to breath and speak regularly

## 2024-02-26 NOTE — PROGRESS NOTES
Patient called and happily reports that she met face to face with Dr. Garcia to sort out medication issues and insurance approved Symponi.  Next step is scheduling infusion at [facility] and in past felt stronger in about 2 months.  She has some work accommodations but feels it necessary to decrease to 4 days a week until medication is therapeutic.  She will remain on Dupixent for Asthma but looking for new Pulmonologist.  She tried scheduling via [employer] but was messaged back that since she's under treatment from Dr. Guzman they can't help her.  I told her to try calling and explain she is switching providers as sometimes understanding the intent of the appointment gets lost via computer.  She also thinks our office is not refilling medications but I don't see any encounters as such.  She needs Albuterol rescue so will send message to PCP.  Plan to follow up next month on status of her chronic conditions

## 2024-02-27 ENCOUNTER — TELEPHONE (OUTPATIENT)
Dept: PRIMARY CARE | Facility: CLINIC | Age: 44
End: 2024-02-27
Payer: COMMERCIAL

## 2024-02-27 DIAGNOSIS — J45.909 ASTHMA, UNSPECIFIED ASTHMA SEVERITY, UNSPECIFIED WHETHER COMPLICATED, UNSPECIFIED WHETHER PERSISTENT (HHS-HCC): ICD-10-CM

## 2024-02-28 RX ORDER — ALBUTEROL SULFATE 90 UG/1
1 AEROSOL, METERED RESPIRATORY (INHALATION) EVERY 4 HOURS PRN
Qty: 18 G | Refills: 2 | Status: SHIPPED | OUTPATIENT
Start: 2024-02-28 | End: 2024-04-28

## 2024-03-12 DIAGNOSIS — M06.09 POLYARTHRITIS WITH NEGATIVE RHEUMATOID FACTOR (MULTI): Primary | ICD-10-CM

## 2024-03-12 DIAGNOSIS — G89.29 OTHER CHRONIC PAIN: ICD-10-CM

## 2024-03-12 RX ORDER — HYDROCODONE BITARTRATE AND ACETAMINOPHEN 5; 325 MG/1; MG/1
1 TABLET ORAL EVERY 6 HOURS PRN
Qty: 28 TABLET | Refills: 0 | Status: SHIPPED | OUTPATIENT
Start: 2024-03-12 | End: 2024-03-19

## 2024-03-12 RX ORDER — TRAMADOL HYDROCHLORIDE 50 MG/1
50 TABLET ORAL 2 TIMES DAILY
Qty: 60 TABLET | Refills: 0 | Status: SHIPPED | OUTPATIENT
Start: 2024-03-12 | End: 2024-04-24 | Stop reason: SDUPTHER

## 2024-03-13 ENCOUNTER — TELEPHONE (OUTPATIENT)
Dept: RHEUMATOLOGY | Facility: CLINIC | Age: 44
End: 2024-03-13
Payer: COMMERCIAL

## 2024-03-13 DIAGNOSIS — M06.09 POLYARTHRITIS WITH NEGATIVE RHEUMATOID FACTOR (MULTI): ICD-10-CM

## 2024-03-13 DIAGNOSIS — G89.29 OTHER CHRONIC PAIN: Primary | ICD-10-CM

## 2024-03-13 NOTE — TELEPHONE ENCOUNTER
Patient called stated that she picked up her prescription after she left the pharmacy she seen the Hydrocodone was the wrong dose she received 5-325 she stated that she gets 7.5 mg.  She would like to know if you can send the right prescription. Please advise

## 2024-03-14 ENCOUNTER — TELEPHONE (OUTPATIENT)
Dept: RHEUMATOLOGY | Facility: CLINIC | Age: 44
End: 2024-03-14
Payer: COMMERCIAL

## 2024-03-14 ENCOUNTER — TELEPHONE (OUTPATIENT)
Dept: RHEUMATOLOGY | Facility: CLINIC | Age: 44
End: 2024-03-14

## 2024-03-14 ENCOUNTER — CLINICAL SUPPORT (OUTPATIENT)
Dept: RHEUMATOLOGY | Facility: CLINIC | Age: 44
End: 2024-03-14
Payer: COMMERCIAL

## 2024-03-14 ENCOUNTER — APPOINTMENT (OUTPATIENT)
Dept: RHEUMATOLOGY | Facility: CLINIC | Age: 44
End: 2024-03-14
Payer: COMMERCIAL

## 2024-03-14 DIAGNOSIS — M06.09 POLYARTHRITIS WITH NEGATIVE RHEUMATOID FACTOR (MULTI): ICD-10-CM

## 2024-03-14 DIAGNOSIS — M06.09 POLYARTHRITIS WITH NEGATIVE RHEUMATOID FACTOR (MULTI): Primary | ICD-10-CM

## 2024-03-14 PROCEDURE — 2500000004 HC RX 250 GENERAL PHARMACY W/ HCPCS (ALT 636 FOR OP/ED): Performed by: INTERNAL MEDICINE

## 2024-03-14 PROCEDURE — 96372 THER/PROPH/DIAG INJ SC/IM: CPT | Performed by: INTERNAL MEDICINE

## 2024-03-14 RX ORDER — KETOROLAC TROMETHAMINE 30 MG/ML
60 INJECTION, SOLUTION INTRAMUSCULAR; INTRAVENOUS ONCE
Status: COMPLETED | OUTPATIENT
Start: 2024-03-14 | End: 2024-03-14

## 2024-03-14 RX ORDER — FAMOTIDINE 10 MG/ML
20 INJECTION INTRAVENOUS ONCE AS NEEDED
Status: CANCELLED | OUTPATIENT
Start: 2024-03-14

## 2024-03-14 RX ORDER — EPINEPHRINE 0.3 MG/.3ML
0.3 INJECTION SUBCUTANEOUS EVERY 5 MIN PRN
Status: CANCELLED | OUTPATIENT
Start: 2024-03-14

## 2024-03-14 RX ORDER — ALBUTEROL SULFATE 0.83 MG/ML
3 SOLUTION RESPIRATORY (INHALATION) AS NEEDED
Status: CANCELLED | OUTPATIENT
Start: 2024-03-14

## 2024-03-14 RX ORDER — DIPHENHYDRAMINE HYDROCHLORIDE 50 MG/ML
50 INJECTION INTRAMUSCULAR; INTRAVENOUS AS NEEDED
Status: CANCELLED | OUTPATIENT
Start: 2024-03-14

## 2024-03-14 RX ORDER — KETOROLAC TROMETHAMINE 30 MG/ML
30 INJECTION, SOLUTION INTRAMUSCULAR; INTRAVENOUS ONCE
Status: DISCONTINUED | OUTPATIENT
Start: 2024-03-14 | End: 2024-03-14

## 2024-03-14 RX ORDER — METHYLPREDNISOLONE ACETATE 80 MG/ML
80 INJECTION, SUSPENSION INTRA-ARTICULAR; INTRALESIONAL; INTRAMUSCULAR; SOFT TISSUE ONCE
Status: COMPLETED | OUTPATIENT
Start: 2024-03-14 | End: 2024-03-14

## 2024-03-14 RX ORDER — OXYCODONE AND ACETAMINOPHEN 7.5; 325 MG/1; MG/1
1 TABLET ORAL EVERY 6 HOURS PRN
Qty: 28 TABLET | Refills: 0 | Status: SHIPPED | OUTPATIENT
Start: 2024-03-15 | End: 2024-03-25 | Stop reason: SDUPTHER

## 2024-03-14 RX ORDER — KETOROLAC TROMETHAMINE 30 MG/ML
30 INJECTION, SOLUTION INTRAMUSCULAR; INTRAVENOUS ONCE
Status: CANCELLED
Start: 2024-03-14 | End: 2024-03-14

## 2024-03-14 RX ADMIN — METHYLPREDNISOLONE ACETATE 80 MG: 80 INJECTION, SUSPENSION INTRA-ARTICULAR; INTRALESIONAL; INTRAMUSCULAR; SOFT TISSUE at 15:44

## 2024-03-14 RX ADMIN — KETOROLAC TROMETHAMINE 60 MG: 60 INJECTION, SOLUTION INTRAMUSCULAR at 15:45

## 2024-03-14 NOTE — TELEPHONE ENCOUNTER
"PT LEFT  ASKING FOR PIAN SHOT, STATES SHE IS \"JUST IN PAIN ALL OVER\" ASKING TO COME AFTER 3:30 TODAY OR BEFORE 10 AM TOMORROW. CALLED PT BACK & IS ASKING FOR DEPOMEDROL & TORADOL. SCHEDULED FOR TODAY  "

## 2024-03-14 NOTE — TELEPHONE ENCOUNTER
I read MD's message to pt.   She verbalized understanding and did not have further questions at the current time.     Omar Khan MA

## 2024-03-14 NOTE — TELEPHONE ENCOUNTER
During her nurse visit today, pt asked if MD can write a letter for her to be able to cut back on hours at her job. She states that she is looking to work 32 hours/week (Monday- Thursday) so she will have 3 days to rest. Pt states she will  letter once completed. Please advise.     Omar Khan MA

## 2024-03-22 ENCOUNTER — PATIENT OUTREACH (OUTPATIENT)
Dept: PRIMARY CARE | Facility: CLINIC | Age: 44
End: 2024-03-22
Payer: COMMERCIAL

## 2024-03-22 DIAGNOSIS — M19.90 ARTHRITIS: ICD-10-CM

## 2024-03-22 DIAGNOSIS — M06.09 RHEUMATOID ARTHRITIS OF MULTIPLE SITES WITHOUT RHEUMATOID FACTOR (MULTI): ICD-10-CM

## 2024-03-22 PROCEDURE — 99490 CHRNC CARE MGMT STAFF 1ST 20: CPT | Performed by: STUDENT IN AN ORGANIZED HEALTH CARE EDUCATION/TRAINING PROGRAM

## 2024-03-22 NOTE — PROGRESS NOTES
Patient reports doing well right now but continues to work with employer on work restrictions reducing hours to 4 days a week.  She does not take pain pills during work and stiffness from not moving along with talking all day on phone creates exacerbations of her chronic conditions.  Dr. [facility] reordered her Dupixent and will have first infusion of Symponi on 4/11/2024 at [facility].  Patient desires infusion center closer to home which I will research for her.  She updates me that Medicaid needs renewed and hopes income does not impact her eligibility.  No other questions voiced at this time.

## 2024-03-25 DIAGNOSIS — G89.29 OTHER CHRONIC PAIN: ICD-10-CM

## 2024-03-25 DIAGNOSIS — M06.09 POLYARTHRITIS WITH NEGATIVE RHEUMATOID FACTOR (MULTI): ICD-10-CM

## 2024-03-26 RX ORDER — OXYCODONE AND ACETAMINOPHEN 7.5; 325 MG/1; MG/1
1 TABLET ORAL EVERY 6 HOURS PRN
Qty: 28 TABLET | Refills: 0 | Status: SHIPPED | OUTPATIENT
Start: 2024-03-26 | End: 2024-04-05 | Stop reason: SDUPTHER

## 2024-03-26 NOTE — PROGRESS NOTES
Determined available outpatient infusion centers are [facility] in [place], [place] or [facility]. Closest to patient would be [facility].  Information sent to her via patient message in [employer].

## 2024-03-28 ENCOUNTER — HOSPITAL ENCOUNTER (OUTPATIENT)
Dept: RADIOLOGY | Facility: CLINIC | Age: 44
Discharge: HOME | End: 2024-03-28
Payer: COMMERCIAL

## 2024-03-28 ENCOUNTER — OFFICE VISIT (OUTPATIENT)
Dept: PRIMARY CARE | Facility: CLINIC | Age: 44
End: 2024-03-28
Payer: COMMERCIAL

## 2024-03-28 VITALS
BODY MASS INDEX: 27.49 KG/M2 | OXYGEN SATURATION: 97 % | HEART RATE: 68 BPM | DIASTOLIC BLOOD PRESSURE: 70 MMHG | SYSTOLIC BLOOD PRESSURE: 104 MMHG | WEIGHT: 165 LBS | HEIGHT: 65 IN | TEMPERATURE: 96.5 F

## 2024-03-28 DIAGNOSIS — Z87.19 HISTORY OF VENTRAL HERNIA REPAIR: ICD-10-CM

## 2024-03-28 DIAGNOSIS — G56.22 ULNAR NEUROPATHY OF LEFT UPPER EXTREMITY: ICD-10-CM

## 2024-03-28 DIAGNOSIS — R10.33 PERIUMBILICAL ABDOMINAL PAIN: Primary | ICD-10-CM

## 2024-03-28 DIAGNOSIS — M25.522 LEFT ELBOW PAIN: ICD-10-CM

## 2024-03-28 DIAGNOSIS — Z98.890 HISTORY OF VENTRAL HERNIA REPAIR: ICD-10-CM

## 2024-03-28 PROCEDURE — 99214 OFFICE O/P EST MOD 30 MIN: CPT | Performed by: STUDENT IN AN ORGANIZED HEALTH CARE EDUCATION/TRAINING PROGRAM

## 2024-03-28 PROCEDURE — 73080 X-RAY EXAM OF ELBOW: CPT | Mod: LT

## 2024-03-28 PROCEDURE — 1036F TOBACCO NON-USER: CPT | Performed by: STUDENT IN AN ORGANIZED HEALTH CARE EDUCATION/TRAINING PROGRAM

## 2024-03-28 PROCEDURE — 73080 X-RAY EXAM OF ELBOW: CPT | Mod: LEFT SIDE | Performed by: RADIOLOGY

## 2024-03-28 ASSESSMENT — PATIENT HEALTH QUESTIONNAIRE - PHQ9
1. LITTLE INTEREST OR PLEASURE IN DOING THINGS: NOT AT ALL
2. FEELING DOWN, DEPRESSED OR HOPELESS: NOT AT ALL
SUM OF ALL RESPONSES TO PHQ9 QUESTIONS 1 AND 2: 0

## 2024-03-28 ASSESSMENT — ENCOUNTER SYMPTOMS
DEPRESSION: 0
OCCASIONAL FEELINGS OF UNSTEADINESS: 0
LOSS OF SENSATION IN FEET: 0

## 2024-03-28 ASSESSMENT — PAIN SCALES - GENERAL: PAINLEVEL: 8

## 2024-03-28 NOTE — PROGRESS NOTES
"Subjective   Susan Ford is a 43 y.o. female who presents for HERNIIA AND LEFT ARM PAIN.    HPI:      This is a 43-year-old female presenting with concern for possible hernia.    Left Arm Pain:  Follows with rheumatology (Mck[facility]), prescribed oxycodone-acetaminophen 7.5-325 and tramadol 50 mg with refills this month (OARRS reviewed.  Also Dupient [id number].  Worsening left elbow pain over past several weeks, can barely touch elbow, cannot lift anything with her left arm.  Also has numbness/tingling sensation in her left pinky and ring finger.    Simponi [facility] infusions approved, will be receiving first infusion 4/10/2024.    Abdominal Pain:  Picked up grandson (3 years old) a few weeks ago, stomach immediately started hurting at site of previous hernia.  Not noted any obvious bulging.  every few days notices the same pain.  No other GI symptoms, no nausea/vomiting, diarrhea.  Chronic constipation d/t medications.    ROS:   Review of systems is essentially negative for all systems except for any identified issues in HPI above.    Objective     /70   Pulse 68   Temp 35.8 °C (96.5 °F)   Ht 1.638 m (5' 4.5\")   Wt 74.8 kg (165 lb)   SpO2 97%   BMI 27.88 kg/m²      PHYSICAL EXAM    GENERAL  Well-appearing, pleasant and cooperative.  No acute distress.    HEENT  HEAD:   Normocephalic.  Atraumatic.  EYES:  PERRLA.  No scleral icterus or conjunctival injection.  EARS:  Tympanic membranes visualized bilaterally without erythema, fluid, or bulging.  NECK:  No adenopathy.  No palpable thyroid enlargement or nodules.    THROAT:  Moist oropharynx without tonsillar enlargement or exudates.    LUNGS:    Clear to auscultation bilaterally.  No wheezes, rales, rhonchi.    CARDIAC:  Regular rate and rhythm.  Normal S1S2.  No murmurs/rubs/gallops.    ABDOMEN:  Vertical scar along lower central abdomen from prior hernia repair with palpable defect on deep palpation and mild TTP without guarding or rebound.  Remainder " of abdomen soft, non-tender, non-distended.  No hepatosplenomegaly.  Normoactive bowel sounds.    MUSCULOSKELETAL:  Diffuse TTP of L elbow without swelling, erythema, increased warmth. No other gross abnormalities.   No joint swelling or erythema,.  No spinal or paraspinal tenderness to palpation.    EXTREMITIES:  No LE edema or cyanosis.      NEURO           Alert and oriented x3. No focal deficits.    PSYCH:          Affect appropriate.           Assessment/Plan   Problem List Items Addressed This Visit    None  Visit Diagnoses       Periumbilical abdominal pain    -  Primary    Palpable defect on exam, unclear whether this is due to to the repair itself or new defect.  CT ordered, surgery referral provided.  ED precautions reviewed.    Relevant Orders    Basic metabolic panel    CT abdomen pelvis w IV contrast    Referral to General Surgery    History of ventral hernia repair        Relevant Orders    Basic metabolic panel    CT abdomen pelvis w IV contrast    Referral to General Surgery    Left elbow pain        X-ray ordered today, patient denies obvious injury.  Will follow x-ray results, patient also advised to discuss further with rheumatologist.    Relevant Orders    XR elbow left 3+ views    Ulnar neuropathy of left upper extremity        Elbow pain noted seems to be related with ulnar neuropathy with  distribution of numbness in 4th and 5th digit of left hand.  Printed information provided.    Relevant Orders    XR elbow left 3+ views          Time Spent  Prep time on day of patient encounter: 5 minutes  Time spent directly with patient, family or caregiver: 20 minutes  Additional Time Spent on Patient Care Activities: 3 minutes  Documentation Time: 5 minutes  Other Time Spent: 0 minutes  Total: 33 minutes        Dora Zamora MD

## 2024-03-28 NOTE — PATIENT INSTRUCTIONS
Thank you for coming to see me today.    Go to radiology for elbow x-ray today.  We will call you with all results.    At least some component of your symptoms are due to ULNAR NEUROPATHY as we discussed.  Try to avoid aggravating positions including resting your elbows on a table and keeping your elbow bent for prolonged periods of time.  Try wrapping your arm to keep it in a straight position overnight.  Additional printed information provided today.    CT abdomen and pelvis ordered for evaluation of your hernia repair and abdominal pain.  You will need to complete a blood test to check your kidney function within 30 days before this imaging study.    General surgery referral entered today for further evaluation of hernia concerns.  You can do the CT scan first to provide them with more information as we discussed.    Go to the emergency department/call 911 for development of severe abdominal pain, abdominal bulging that cannot be pushed back in, associated with skin changes.    Follow-up with me in June for physical exam, sooner if needed.

## 2024-04-02 DIAGNOSIS — M06.09 POLYARTHRITIS WITH NEGATIVE RHEUMATOID FACTOR (MULTI): ICD-10-CM

## 2024-04-02 DIAGNOSIS — G89.29 OTHER CHRONIC PAIN: ICD-10-CM

## 2024-04-05 ENCOUNTER — TELEPHONE (OUTPATIENT)
Dept: PRIMARY CARE | Facility: CLINIC | Age: 44
End: 2024-04-05

## 2024-04-05 DIAGNOSIS — M06.09 POLYARTHRITIS WITH NEGATIVE RHEUMATOID FACTOR (MULTI): ICD-10-CM

## 2024-04-05 DIAGNOSIS — G89.29 OTHER CHRONIC PAIN: ICD-10-CM

## 2024-04-05 RX ORDER — OXYCODONE AND ACETAMINOPHEN 7.5; 325 MG/1; MG/1
1 TABLET ORAL EVERY 6 HOURS PRN
Qty: 12 TABLET | Refills: 0 | Status: SHIPPED | OUTPATIENT
Start: 2024-04-05 | End: 2024-04-08

## 2024-04-05 RX ORDER — OXYCODONE AND ACETAMINOPHEN 7.5; 325 MG/1; MG/1
1 TABLET ORAL EVERY 6 HOURS PRN
Qty: 28 TABLET | Refills: 0 | Status: SHIPPED | OUTPATIENT
Start: 2024-04-05 | End: 2024-04-11 | Stop reason: SDUPTHER

## 2024-04-05 NOTE — TELEPHONE ENCOUNTER
PT calling stating her rheumatologist usually prescribes oxycodone 7.5-325 for her.  PT states that doctor is currently out of the office and unable to refill the RX at this time. PT wondering if Dr. Zamora could send a one time refill on this medication.  PT uses [facility] on [address].

## 2024-04-05 NOTE — TELEPHONE ENCOUNTER
Short term (3 day) rx sent to [facility] to last patient until rheumatologist is back in office next week.  PDMP/OARRS reviewed, also reviewed TE with rheumatologist's office today confirming that she is out of office and recommendation to contact PCP.    Please notify patient of short term rx.  Forwarding to Dr. Jose Hernandez (rheumatologist) as well as [facility].

## 2024-04-05 NOTE — TELEPHONE ENCOUNTER
PT CALLED AGAIN, IS NOW OUT OF MEDS & IN PAIN. EXPLAINED DR ROJAS WILL NOT BE BACK IN OFFICE UNTIL TUES & AM PRESUMING SHE WILL BE CHECKING MESSAGES TONIGHT. PT ASKED IF I COULD CALL PCP FOR REFILL & EXPLAINED SHE (PT) WOULD HAVE TO CALL PCP, PT SAID NOTHING & HUNG UP.

## 2024-04-11 ENCOUNTER — DOCUMENTATION (OUTPATIENT)
Dept: INFUSION THERAPY | Facility: CLINIC | Age: 44
End: 2024-04-11

## 2024-04-11 ENCOUNTER — INFUSION (OUTPATIENT)
Dept: INFUSION THERAPY | Facility: CLINIC | Age: 44
End: 2024-04-11
Payer: COMMERCIAL

## 2024-04-11 VITALS
OXYGEN SATURATION: 100 % | SYSTOLIC BLOOD PRESSURE: 107 MMHG | TEMPERATURE: 98.3 F | HEART RATE: 60 BPM | WEIGHT: 166 LBS | RESPIRATION RATE: 16 BRPM | DIASTOLIC BLOOD PRESSURE: 43 MMHG | BODY MASS INDEX: 28.05 KG/M2

## 2024-04-11 DIAGNOSIS — M06.09 POLYARTHRITIS WITH NEGATIVE RHEUMATOID FACTOR (MULTI): ICD-10-CM

## 2024-04-11 DIAGNOSIS — M25.571 RIGHT ANKLE PAIN, UNSPECIFIED CHRONICITY: ICD-10-CM

## 2024-04-11 DIAGNOSIS — G89.29 OTHER CHRONIC PAIN: ICD-10-CM

## 2024-04-11 LAB — PREGNANCY TEST URINE, POC: NEGATIVE

## 2024-04-11 PROCEDURE — 81025 URINE PREGNANCY TEST: CPT | Performed by: NURSE PRACTITIONER

## 2024-04-11 PROCEDURE — 96365 THER/PROPH/DIAG IV INF INIT: CPT | Performed by: NURSE PRACTITIONER

## 2024-04-11 RX ORDER — OXYCODONE AND ACETAMINOPHEN 7.5; 325 MG/1; MG/1
1 TABLET ORAL EVERY 6 HOURS PRN
Qty: 28 TABLET | Refills: 0 | Status: SHIPPED | OUTPATIENT
Start: 2024-04-14 | End: 2024-04-19 | Stop reason: SDUPTHER

## 2024-04-11 RX ORDER — FAMOTIDINE 10 MG/ML
20 INJECTION INTRAVENOUS ONCE AS NEEDED
OUTPATIENT
Start: 2024-05-09

## 2024-04-11 RX ORDER — KETOROLAC TROMETHAMINE 30 MG/ML
30 INJECTION, SOLUTION INTRAMUSCULAR; INTRAVENOUS ONCE
Status: CANCELLED
Start: 2024-05-09 | End: 2024-05-09

## 2024-04-11 RX ORDER — EPINEPHRINE 0.3 MG/.3ML
0.3 INJECTION SUBCUTANEOUS EVERY 5 MIN PRN
OUTPATIENT
Start: 2024-05-09

## 2024-04-11 RX ORDER — DIPHENHYDRAMINE HYDROCHLORIDE 50 MG/ML
50 INJECTION INTRAMUSCULAR; INTRAVENOUS AS NEEDED
OUTPATIENT
Start: 2024-05-09

## 2024-04-11 RX ORDER — METHYLPREDNISOLONE SODIUM SUCCINATE 125 MG/2ML
125 INJECTION INTRAMUSCULAR; INTRAVENOUS ONCE
Status: CANCELLED
Start: 2024-05-09 | End: 2024-05-09

## 2024-04-11 RX ORDER — ALBUTEROL SULFATE 0.83 MG/ML
3 SOLUTION RESPIRATORY (INHALATION) AS NEEDED
OUTPATIENT
Start: 2024-05-09

## 2024-04-11 ASSESSMENT — ENCOUNTER SYMPTOMS
ARTHRALGIAS: 1
MYALGIAS: 1

## 2024-04-11 ASSESSMENT — PAIN SCALES - GENERAL: PAINLEVEL: 6

## 2024-04-11 NOTE — PROGRESS NOTES
"CLINICAL CLEARANCE:     Patient to be scheduled for A NEW START of Simponi Aria infusions  For Diagnosis: Rheumatoid Arthritis     Dosing is weight based at: 2mg/kg  Using Dosing weight of: 74.8 kg  For a Total Dose of: 150 mg at weeks 0, 4 (induction), and then every 8 weeks thereafter (maintenance).    Labs…  Hep B SAg Drawn/Results:   Lab Results   Component Value Date    HEPBSAG NEGATIVE 10/14/2020      Hep B Core Antibody:   Lab Results   Component Value Date    HEPCAB  10/14/2020     Negative  Reference range: NEGATIVE  Performed at the [facility] unless   otherwise noted.      No results found for: \"HBCTI\", \"HEPBCAB\"  Hep C Antibody:   Lab Results   Component Value Date    HEPCAB  10/14/2020     Negative  Reference range: NEGATIVE  Performed at the [facility] unless   otherwise noted.        Lab Results   Component Value Date    HEPCAB  10/14/2020     Negative  Reference range: NEGATIVE  Performed at the [facility] unless   otherwise noted.       CBC:  Lab Results   Component Value Date    WBC 7.0 11/14/2023    HGB 12.1 11/14/2023    HCT 39.1 11/14/2023    MCV 85 11/14/2023     11/14/2023        T-Spot Drawn/Results:   Lab Results   Component Value Date    TBGRES Negative  Reference range: NEGATIVE   10/14/2020    TSPOTR  09/12/2023     Negative  Reference range: Normal Value: Negative    A negative test result does not exclude the possibility of exposure  to   or infection with Mycobacterium tuberculosis (M. tuberculosis).    Patients with recent exposure to TB infected individuals exhibiting a   negative T-SPOT.TB result should be considered for retesting within 6   weeks or if other relevant clinical symptoms indicate.  Results from   T-SPOT.TB testing must be used in conjunction with each individual's   epidemiological history, current medical status, and results of other   diagnostic evaluations.  The T-SPOT.TB test " "is qualitative and  results   are reported as positive, borderline or negative, given that the test   controls perform as expected. In line with the Centers for Disease   Control and Prevention's 2010 recommendation to report quantitative   measurements alongside the qualitative result, the laboratory  provides   spot counts for informational purposes only.  The T-SPOT.TB test  should   not be interpreted as a quantitative test.  Test performed at:  [facility] [address] [facility] [address]        No results found for: \"NONUHFIRE\", \"NONUHSWGH\", \"NONUHFISH\", \"EXTHEPBSAG\"    Does the patient have a diagnosis of heart failure? No  (may exacerbate HF)    Does the patient have a history of cancer, especially lymphomas? No    Does the patient have a history of demyelinating disorder such as MS? No    (If yes assure prescribing provider aware and okay to proceed)    Patient Active Problem List   Diagnosis    Polyarthritis with negative rheumatoid factor (CMS/HCC)    Abnormal swallowing    Allergic rhinitis due to [employer] house dust mite    Anxiety with depression    Arthralgia of right knee    Chronic rhinitis    Constipation    Daytime somnolence    Dyspnea, unspecified    Asthma    Food intolerance in adult    Frequent falls    Gait instability    Irritable bowel syndrome    Hypertrophy of nasal turbinates    Eustachian tube dysfunction    Vitamin D deficiency    Sprain of left knee    Subluxation of left patella    Seasonal allergies    Sarcoidosis of lung (CMS/HCC)    Rheumatoid lung (CMS/HCC)    Patellofemoral arthritis of left knee    Chronic pain syndrome    Joint pain    Laryngopharyngeal reflux (LPR)    Lumbago with sciatica, left side    Mild chronic gastritis    Obstructive sleep apnea    Osteoarthritis of left knee    Other abnormalities of breathing    Systemic lupus erythematosus, organ or system involvement unspecified (CMS/HCC)    Urgency of urination    Patellofemoral disorders, right knee    Patellar " tendinitis, left knee    Autoimmune hepatitis (CMS/HCC)      Past Medical History:   Diagnosis Date    Asthma     Cervicalgia     Chronic pain syndrome     Frequent falls     GERD (gastroesophageal reflux disease)     Knee pain, right     Snoring     Snoring    UTI (urinary tract infection)         Last infusion received: NEW START  Due: SCHEDULED 4/11/2024    Induction and Maintenance Therapy Plans entered if needed? Yes (if no prescribing provider to be contacted to enter)    This result meets treatment criteria.

## 2024-04-11 NOTE — PATIENT INSTRUCTIONS
Today :We administered golimumab (Simponi Aria) 150 mg in sodium chloride 0.9% 100 mL IV.     For:   1. Polyarthritis with negative rheumatoid factor (CMS/[facility])         Your next appointment is due in:  as scheduled        Please read the  Medication Guide that was given to you and reviewed during todays visit.     (Tell all doctors including dentists that you are taking this medication)     Go to the emergency room or call 911 if:  -You have signs of allergic reaction:   -Rash, hives, itching.   -Swollen, blistered, peeling skin.   -Swelling of face, lips, mouth, tongue or throat.   -Tightness of chest, trouble breathing, swallowing or talking     Call your doctor:  - If IV / injection site gets red, warm, swollen, itchy or leaks fluid or pus.     (Leave dressing on your IV site for at least 2 hours and keep area clean and dry  - If you get sick or have symptoms of infection or are not feeling well for any reason.    (Wash your hands often, stay away from people who are sick)  - If you have side effects from your medication that do not go away or are bothersome.     (Refer to the teaching your nurse gave you for side effects to call your doctor about)    - Common side effects may include:  stuffy nose, headache, feeling tired, muscle aches, upset stomach  - Before receiving any vaccines     - Call [facility] at   If:  - You get sick, are on antibiotics, have had a recent vaccine, have surgery or dental work and your doctor wants your visit rescheduled.  - You need to cancel and reschedule your visit for any reason. Call at least 2 days before your visit if you need to cancel.   - Your insurance changes before your next visit.    (We will need to get approval from your new insurance. This can take up to two weeks.)     The [facility] is opened Monday thru Friday. We are closed on weekends and holidays.   Voice mail will take your call if the center is closed. If you leave a  message please allow 24 hours for a call back during weekdays. If you leave a message on a weekend/holiday, we will call you back the next business day.

## 2024-04-11 NOTE — TELEPHONE ENCOUNTER
"PT LEFT VM, ASKING FOR XRAY OF RT ANKLE BECAUSE IT IS VERY PAINFUL FOR LAST 2 WEEKS & STATES IT IS DIFFICULT TO PUT WT ON IT. STATES SHE HAS INFUSION APPT TODAY & WOULD LIKE TO GO TO XRAY THEN.     ALSO \"CALLING IN REFILL, BECAUSE IT TAKES A COUPLE DAYS FOR HER TO FILL\" OF OXYCODONE 7.5/325.  "

## 2024-04-19 DIAGNOSIS — M06.09 POLYARTHRITIS WITH NEGATIVE RHEUMATOID FACTOR (MULTI): ICD-10-CM

## 2024-04-19 DIAGNOSIS — G89.29 OTHER CHRONIC PAIN: ICD-10-CM

## 2024-04-19 RX ORDER — OXYCODONE AND ACETAMINOPHEN 7.5; 325 MG/1; MG/1
1 TABLET ORAL EVERY 6 HOURS PRN
Qty: 28 TABLET | Refills: 0 | Status: SHIPPED | OUTPATIENT
Start: 2024-04-21 | End: 2024-04-24 | Stop reason: SDUPTHER

## 2024-04-22 ENCOUNTER — PATIENT OUTREACH (OUTPATIENT)
Dept: PRIMARY CARE | Facility: CLINIC | Age: 44
End: 2024-04-22
Payer: COMMERCIAL

## 2024-04-22 DIAGNOSIS — M06.9 RHEUMATIC JOINT DISEASE (MULTI): ICD-10-CM

## 2024-04-22 DIAGNOSIS — M19.90 CHRONIC ARTHRITIS: ICD-10-CM

## 2024-04-22 PROCEDURE — 99490 CHRNC CARE MGMT STAFF 1ST 20: CPT | Performed by: STUDENT IN AN ORGANIZED HEALTH CARE EDUCATION/TRAINING PROGRAM

## 2024-04-22 NOTE — PROGRESS NOTES
Chart reviewed and noted that patient has had several episodes of acute pain related to injuries.  Left elbow is most bothersome to her and is scheduled to see ortho JUSTO Valentino DO for bone spur on 5/14/2024.  General Surgeon JUSTO Handley MD will be seen 6/13/2024 for suspected hernia.  Due to increased workload and training due to promotion at work she has been unable to schedule CT abdomen or xray of her ankle.  She desires reasonable work accomodation letter from Dr. Garcia but said has not been able to get that from her.  Also has issues receiving pain medication as she must call every week for refill of Oxycodone and last month's Tramadol fill has zero refills which she didn't find out until today.  Patient frustrated with the effort she must put in with this doctor but there is such shortage of Rheumatologist that she doesn't think worth making changes.  She feels some benefit from first Symponi infusion in 2 years that was given 11 days ago.   Next one scheduled for May 5th.  CM will continue with monthly outreaches to help patient reach wellness goals.

## 2024-04-23 ENCOUNTER — HOSPITAL ENCOUNTER (OUTPATIENT)
Dept: RADIOLOGY | Facility: CLINIC | Age: 44
Discharge: HOME | End: 2024-04-23
Payer: COMMERCIAL

## 2024-04-23 DIAGNOSIS — M25.571 RIGHT ANKLE PAIN, UNSPECIFIED CHRONICITY: ICD-10-CM

## 2024-04-23 PROCEDURE — 73610 X-RAY EXAM OF ANKLE: CPT | Mod: RT

## 2024-04-23 PROCEDURE — 73610 X-RAY EXAM OF ANKLE: CPT | Mod: RIGHT SIDE | Performed by: RADIOLOGY

## 2024-04-24 ENCOUNTER — TELEPHONE (OUTPATIENT)
Dept: RHEUMATOLOGY | Facility: CLINIC | Age: 44
End: 2024-04-24
Payer: COMMERCIAL

## 2024-04-24 DIAGNOSIS — G89.29 OTHER CHRONIC PAIN: ICD-10-CM

## 2024-04-24 DIAGNOSIS — M06.09 POLYARTHRITIS WITH NEGATIVE RHEUMATOID FACTOR (MULTI): ICD-10-CM

## 2024-04-24 RX ORDER — TRAMADOL HYDROCHLORIDE 50 MG/1
50 TABLET ORAL 2 TIMES DAILY
Qty: 60 TABLET | Refills: 3 | Status: SHIPPED | OUTPATIENT
Start: 2024-04-24

## 2024-04-24 RX ORDER — OXYCODONE AND ACETAMINOPHEN 7.5; 325 MG/1; MG/1
1 TABLET ORAL EVERY 6 HOURS PRN
Qty: 28 TABLET | Refills: 0 | Status: SHIPPED | OUTPATIENT
Start: 2024-04-28 | End: 2024-05-06 | Stop reason: SDUPTHER

## 2024-04-24 NOTE — TELEPHONE ENCOUNTER
"PT CALLED, STATES SHE SAW RESULTS OF ANKLE XRAY & IT SHOW SPUR. PER PT THIS IS THE SAME as HER ELBOW. ASKING IF SHE CAN EXPECT THE SPURS TO CONTINUE TO APPEAR ON JTS DUE TO DISEASE PROCESS?  STATES SHE IS USING ACE BANDAGE ON ANKLE & CAN BARELY WALK, STATES SHE IS SEEING ANOTHER MD FOR THE ELBOW SPUR & IS ASKING TO HAVE IT REMOVED DUE TO PAIN.     STATES SIMPONI DOES SEEM TO BE HELPING OTHERWISE, SXS SEEM LESS SEVERE.    ASKING FOR REFILLS OF  TR AMDOL & OXYCODONE \"WHILE I AM CALLING\" , PT KNOWS NOT DUE YET. BUT WANTS TO LEAVE MESSAGE NOW SO SHE DOESN'T FORGET TO CALL AGAIN.  "

## 2024-04-25 ENCOUNTER — TELEPHONE (OUTPATIENT)
Dept: PRIMARY CARE | Facility: CLINIC | Age: 44
End: 2024-04-25
Payer: COMMERCIAL

## 2024-04-25 NOTE — TELEPHONE ENCOUNTER
Patient was exposed to strep and she is now having a sore throat. She does not want to come in the office but wants to know if an rx can be called in for her. Exposed by daughter and grandson.

## 2024-04-25 NOTE — TELEPHONE ENCOUNTER
Needs to come in for throat swab/culture prior to treatment.  Nurse visit OK, does not need OV with me.

## 2024-05-06 DIAGNOSIS — G89.29 OTHER CHRONIC PAIN: ICD-10-CM

## 2024-05-06 DIAGNOSIS — M06.09 POLYARTHRITIS WITH NEGATIVE RHEUMATOID FACTOR (MULTI): ICD-10-CM

## 2024-05-06 RX ORDER — OXYCODONE AND ACETAMINOPHEN 7.5; 325 MG/1; MG/1
1 TABLET ORAL EVERY 6 HOURS PRN
Qty: 28 TABLET | Refills: 0 | Status: SHIPPED | OUTPATIENT
Start: 2024-05-06 | End: 2024-05-10 | Stop reason: SDUPTHER

## 2024-05-08 ENCOUNTER — HOSPITAL ENCOUNTER (OUTPATIENT)
Dept: RADIOLOGY | Facility: CLINIC | Age: 44
Discharge: HOME | End: 2024-05-08
Payer: COMMERCIAL

## 2024-05-08 ENCOUNTER — APPOINTMENT (OUTPATIENT)
Dept: ORTHOPEDIC SURGERY | Facility: CLINIC | Age: 44
End: 2024-05-08
Payer: COMMERCIAL

## 2024-05-08 ENCOUNTER — TELEPHONE (OUTPATIENT)
Dept: RHEUMATOLOGY | Facility: CLINIC | Age: 44
End: 2024-05-08
Payer: COMMERCIAL

## 2024-05-08 DIAGNOSIS — Z98.890 HISTORY OF VENTRAL HERNIA REPAIR: ICD-10-CM

## 2024-05-08 DIAGNOSIS — Z87.19 HISTORY OF VENTRAL HERNIA REPAIR: ICD-10-CM

## 2024-05-08 DIAGNOSIS — M06.09 POLYARTHRITIS WITH NEGATIVE RHEUMATOID FACTOR (MULTI): ICD-10-CM

## 2024-05-08 DIAGNOSIS — Z79.52 LONG TERM (CURRENT) USE OF SYSTEMIC STEROIDS: ICD-10-CM

## 2024-05-08 DIAGNOSIS — R10.33 PERIUMBILICAL ABDOMINAL PAIN: ICD-10-CM

## 2024-05-08 PROCEDURE — 2550000001 HC RX 255 CONTRASTS: Performed by: STUDENT IN AN ORGANIZED HEALTH CARE EDUCATION/TRAINING PROGRAM

## 2024-05-08 PROCEDURE — 77080 DXA BONE DENSITY AXIAL: CPT

## 2024-05-08 PROCEDURE — 74177 CT ABD & PELVIS W/CONTRAST: CPT

## 2024-05-08 PROCEDURE — 74177 CT ABD & PELVIS W/CONTRAST: CPT | Performed by: RADIOLOGY

## 2024-05-08 RX ADMIN — IOHEXOL 75 ML: 350 INJECTION, SOLUTION INTRAVENOUS at 14:45

## 2024-05-08 NOTE — TELEPHONE ENCOUNTER
"PT CALLED, ASKING FOR LETTER FOR WORK ACCOMMODATION TO WORK 4 DAYS A WEEK OR NO MORE THAN 32 HRS. PT STATES SHE IS \"EXHAUSTED\" AT END OF EACH DAY & CANNOT WORK A FULL WEEK ANYMORE.  "

## 2024-05-10 ENCOUNTER — INFUSION (OUTPATIENT)
Dept: INFUSION THERAPY | Facility: CLINIC | Age: 44
End: 2024-05-10
Payer: COMMERCIAL

## 2024-05-10 VITALS
SYSTOLIC BLOOD PRESSURE: 114 MMHG | RESPIRATION RATE: 16 BRPM | TEMPERATURE: 98.6 F | WEIGHT: 166.4 LBS | BODY MASS INDEX: 28.12 KG/M2 | HEART RATE: 57 BPM | DIASTOLIC BLOOD PRESSURE: 61 MMHG | OXYGEN SATURATION: 98 %

## 2024-05-10 DIAGNOSIS — G89.29 OTHER CHRONIC PAIN: ICD-10-CM

## 2024-05-10 DIAGNOSIS — M06.09 POLYARTHRITIS WITH NEGATIVE RHEUMATOID FACTOR (MULTI): Primary | ICD-10-CM

## 2024-05-10 DIAGNOSIS — M06.09 POLYARTHRITIS WITH NEGATIVE RHEUMATOID FACTOR (MULTI): ICD-10-CM

## 2024-05-10 LAB — PREGNANCY TEST URINE, POC: NEGATIVE

## 2024-05-10 PROCEDURE — 96375 TX/PRO/DX INJ NEW DRUG ADDON: CPT | Performed by: NURSE PRACTITIONER

## 2024-05-10 PROCEDURE — 96365 THER/PROPH/DIAG IV INF INIT: CPT | Performed by: NURSE PRACTITIONER

## 2024-05-10 PROCEDURE — 81025 URINE PREGNANCY TEST: CPT | Performed by: NURSE PRACTITIONER

## 2024-05-10 RX ORDER — ALBUTEROL SULFATE 0.83 MG/ML
3 SOLUTION RESPIRATORY (INHALATION) AS NEEDED
OUTPATIENT
Start: 2024-06-06

## 2024-05-10 RX ORDER — ALBUTEROL SULFATE 0.83 MG/ML
3 SOLUTION RESPIRATORY (INHALATION) AS NEEDED
OUTPATIENT
Start: 2024-05-10

## 2024-05-10 RX ORDER — METHYLPREDNISOLONE SODIUM SUCCINATE 125 MG/2ML
125 INJECTION INTRAMUSCULAR; INTRAVENOUS ONCE
Status: COMPLETED | OUTPATIENT
Start: 2024-05-10 | End: 2024-05-10

## 2024-05-10 RX ORDER — METHYLPREDNISOLONE SODIUM SUCCINATE 125 MG/2ML
125 INJECTION INTRAMUSCULAR; INTRAVENOUS ONCE
Status: CANCELLED
Start: 2024-06-06 | End: 2024-06-06

## 2024-05-10 RX ORDER — FAMOTIDINE 10 MG/ML
20 INJECTION INTRAVENOUS ONCE AS NEEDED
OUTPATIENT
Start: 2024-05-10

## 2024-05-10 RX ORDER — FAMOTIDINE 10 MG/ML
20 INJECTION INTRAVENOUS ONCE AS NEEDED
OUTPATIENT
Start: 2024-06-06

## 2024-05-10 RX ORDER — EPINEPHRINE 0.3 MG/.3ML
0.3 INJECTION SUBCUTANEOUS EVERY 5 MIN PRN
OUTPATIENT
Start: 2024-05-10

## 2024-05-10 RX ORDER — KETOROLAC TROMETHAMINE 30 MG/ML
30 INJECTION, SOLUTION INTRAMUSCULAR; INTRAVENOUS ONCE
Status: CANCELLED
Start: 2024-06-06 | End: 2024-06-06

## 2024-05-10 RX ORDER — KETOROLAC TROMETHAMINE 30 MG/ML
30 INJECTION, SOLUTION INTRAMUSCULAR; INTRAVENOUS ONCE
Status: COMPLETED | OUTPATIENT
Start: 2024-05-10 | End: 2024-05-10

## 2024-05-10 RX ORDER — DIPHENHYDRAMINE HYDROCHLORIDE 50 MG/ML
50 INJECTION INTRAMUSCULAR; INTRAVENOUS AS NEEDED
OUTPATIENT
Start: 2024-05-10

## 2024-05-10 RX ORDER — OXYCODONE AND ACETAMINOPHEN 7.5; 325 MG/1; MG/1
1 TABLET ORAL EVERY 6 HOURS PRN
Qty: 28 TABLET | Refills: 0 | Status: SHIPPED | OUTPATIENT
Start: 2024-05-13 | End: 2024-05-17 | Stop reason: SDUPTHER

## 2024-05-10 RX ORDER — EPINEPHRINE 0.3 MG/.3ML
0.3 INJECTION SUBCUTANEOUS EVERY 5 MIN PRN
OUTPATIENT
Start: 2024-06-06

## 2024-05-10 RX ORDER — DIPHENHYDRAMINE HYDROCHLORIDE 50 MG/ML
50 INJECTION INTRAMUSCULAR; INTRAVENOUS AS NEEDED
OUTPATIENT
Start: 2024-06-06

## 2024-05-10 RX ORDER — METHYLPREDNISOLONE SODIUM SUCCINATE 125 MG/2ML
125 INJECTION INTRAMUSCULAR; INTRAVENOUS ONCE
Status: CANCELLED
Start: 2024-06-07 | End: 2024-06-07

## 2024-05-10 RX ORDER — KETOROLAC TROMETHAMINE 30 MG/ML
30 INJECTION, SOLUTION INTRAMUSCULAR; INTRAVENOUS ONCE
Status: CANCELLED
Start: 2024-06-07 | End: 2024-06-07

## 2024-05-10 RX ADMIN — METHYLPREDNISOLONE SODIUM SUCCINATE 125 MG: 125 INJECTION INTRAMUSCULAR; INTRAVENOUS at 08:44

## 2024-05-10 RX ADMIN — KETOROLAC TROMETHAMINE 30 MG: 30 INJECTION, SOLUTION INTRAMUSCULAR; INTRAVENOUS at 09:00

## 2024-05-10 ASSESSMENT — ENCOUNTER SYMPTOMS
MYALGIAS: 1
ARTHRALGIAS: 1
BACK PAIN: 1

## 2024-05-10 ASSESSMENT — PAIN SCALES - GENERAL: PAINLEVEL: 7

## 2024-05-10 NOTE — PATIENT INSTRUCTIONS
Today :We administered golimumab (Simponi Aria) 150 mg in sodium chloride 0.9% 100 mL IV, methylPREDNISolone sodium succinate (PF), and ketorolac.     For:   1. Polyarthritis with negative rheumatoid factor (Multi)         Your next appointment is due in:  one month        Please read the  Medication Guide that was given to you and reviewed during todays visit.     (Tell all doctors including dentists that you are taking this medication)     Go to the emergency room or call 911 if:  -You have signs of allergic reaction:   -Rash, hives, itching.   -Swollen, blistered, peeling skin.   -Swelling of face, lips, mouth, tongue or throat.   -Tightness of chest, trouble breathing, swallowing or talking     Call your doctor:  - If IV / injection site gets red, warm, swollen, itchy or leaks fluid or pus.     (Leave dressing on your IV site for at least 2 hours and keep area clean and dry  - If you get sick or have symptoms of infection or are not feeling well for any reason.    (Wash your hands often, stay away from people who are sick)  - If you have side effects from your medication that do not go away or are bothersome.     (Refer to the teaching your nurse gave you for side effects to call your doctor about)    - Common side effects may include:  stuffy nose, headache, feeling tired, muscle aches, upset stomach  - Before receiving any vaccines     - Call the [facility] at   If:  - You get sick, are on antibiotics, have had a recent vaccine, have surgery or dental work and your doctor wants your visit rescheduled.  - You need to cancel and reschedule your visit for any reason. Call at least 2 days before your visit if you need to cancel.   - Your insurance changes before your next visit.    (We will need to get approval from your new insurance. This can take up to two weeks.)     The [facility] is opened Monday thru Friday. We are closed on weekends and holidays.   Voice mail will take  your call if the center is closed. If you leave a message please allow 24 hours for a call back during weekdays. If you leave a message on a weekend/holiday, we will call you back the next business day.

## 2024-05-10 NOTE — PROGRESS NOTES
[facility]   Infusion Clinic Note   Date: May 10, 2024   Name: Susan Ford  : 1980   MRN: [id number]         Reason for Visit: OP Infusion (Simponi)         Visit Type: INFUSION       Ordered By: Dr Garcia      Accompanied by:Self      Diagnosis: Polyarthritis with negative rheumatoid factor (Multi)       Allergies:   Allergies as of 05/10/2024 - Reviewed 05/10/2024   Allergen Reaction Noted    Penicillin Shortness of breath 2023    Penicillins Shortness of breath, Anaphylaxis, and Swelling 10/29/2008    Bee pollen Unknown 01/10/2024    House dust Unknown 01/10/2024         Current Medications has a current medication list which includes the following prescription(s): albuterol, albuterol, benzonatate, budesonide, cyclobenzaprine, dupixent pen, epipen 2-mohamud, fexofenadine, fluticasone, folic acid, meclizine, dulera, oxycodone-acetaminophen, sulfasalazine, tramadol, and vienva.       Vitals:   Vitals:    05/10/24 0802 05/10/24 0920   BP: 120/70 114/61   Pulse: 56 57   Resp: 18 16   Temp: 36.2 °C (97.2 °F) 37 °C (98.6 °F)   SpO2: 100% 98%   Weight: 75.5 kg (166 lb 6.4 oz)    PainSc:   7    PainLoc: Generalized    LMP: 2024             Infusion Pre-procedure Checklist:   - Allergies reviewed: yes   - Medications reviewed: yes       - Previous reaction to current treatment: no      Assess patient for the concerns below. Document provider notification as appropriate.  - Active or recent infection with/without current antibiotic use: no  - Recent or planned invasive dental work: no  - Recent or planned surgeries: no  - Recently received or plans to receive vaccinations: no  - Has treatment related toxicities: no  - Is pregnant:  no      Pain: 6   - Is the pain different from normal: no   - Is the pain tolerable: yes   - Is your Doctor aware:  n/a      Labs:  Urine HCG negative         Fall Risk Screening: Yosvany Fall Risk  History of Falling, Immediate or Within 3 Months:  No  Secondary Diagnosis: No  Ambulatory Aid: Walks without aid/bedrest/nurse assist  Intravenous Therapy/Heparin Lock: No  Gait/Transferring: Normal/bedrest/immobile  Mental Status: Oriented to own ability  Bar Fall Risk Score: 0       Review Of Systems:  Review of Systems   Musculoskeletal:  Positive for arthralgias, back pain and myalgias.   All other systems reviewed and are negative.        Infusion Readiness:   - Assessment Concerns Related to Infusion: No  - Provider notified: n/a      Document Below Only If Indicated:   New Patient Education:    N/A (returning patient for continuation of therapy. Ongoing education provided as needed.)        Treatment Conditions & Drug Specific Questions:    Golimumab  (SIMPONI)    (Unless otherwise specified on patient specific therapy plan):     TREATMENT CONDITIONS:  Unless otherwise specified on patient specific thearpy plan HOLD and notify provider prior to proceeding with today's infusion if patient with:  o Positive T-Spot  o Positive Hepatitis B Surface Ag    Lab Results   Component Value Date    TBGRES Negative  Reference range: NEGATIVE   10/14/2020    TSPOTR  09/12/2023     Negative  Reference range: Normal Value: Negative    A negative test result does not exclude the possibility of exposure  to   or infection with Mycobacterium tuberculosis (M. tuberculosis).    Patients with recent exposure to TB infected individuals exhibiting a   negative T-SPOT.TB result should be considered for retesting within 6   weeks or if other relevant clinical symptoms indicate.  Results from   T-SPOT.TB testing must be used in conjunction with each individual's   epidemiological history, current medical status, and results of other   diagnostic evaluations.  The T-SPOT.TB test is qualitative and  results   are reported as positive, borderline or negative, given that the test   controls perform as expected. In line with the Centers for Disease   Control and Prevention's 2010  "recommendation to report quantitative   measurements alongside the qualitative result, the laboratory  provides   spot counts for informational purposes only.  The T-SPOT.TB test  should   not be interpreted as a quantitative test.  Test performed at:  [facility] [address] [place] [id number]        Lab Results   Component Value Date    HEPBSAG NEGATIVE 10/14/2020      No results found for: \"NONUHFIRE\", \"NONUHSWGH\", \"NONUHFISH\", \"EXTHEPBSAG\"  Lab Results   Component Value Date    HEPCAB  10/14/2020     Negative  Reference range: NEGATIVE  Performed at the [facility] unless   otherwise noted.        Lab Results   Component Value Date    HEPCAB  10/14/2020     Negative  Reference range: NEGATIVE  Performed at the [facility] unless   otherwise noted.       No results found for: \"HBCTI\", \"HEPBCAB\"    Lab Results   Component Value Date    WBC 7.0 11/14/2023    HGB 12.1 11/14/2023    HCT 39.1 11/14/2023    MCV 85 11/14/2023     11/14/2023        Labs reviewed and patient meets treatment conditions? Yes    DRUG SPECIFIC QUESTIONS:   - Up to date on all immunizations? Yes    Immunization History   Administered Date(s) Administered    Flu vaccine (IIV4), preservative free *Check age/dose* 09/17/2020    Influenza, injectable, quadrivalent 10/14/2020    PPD Test 09/20/2019    Pneumococcal conjugate vaccine, 20-valent (PREVNAR 20) 06/21/2023    Td (adult) 02/26/2011    Tdap vaccine, age 7 year and older (BOOSTRIX, ADACEL) 09/17/2020, 11/10/2020         - Any history of or new or worsening s/s of heart failure? No  (If worsening s/s notify provider prior to proceeding. Simponi may cause   exacerbation of heart failure)     - Any history of cancer, especially lymphomas? No    (Box Warning: Malignancy)      REMINDER:  WEIGHT BASED DRUG    Recommended Vitals/Observation:  Vitals: Take vital signs prior to starting infusion, at infusion conclusion and as " needed.   Observation: No observation.        Weight Based Drug Calculations:    WEIGHT BASED DRUGS: Golimumab (SIMPONI)   Patient's dosing weight (kg): 74.8     10% weight variance for prescribed treatment: 67.4 kg to 82.2 kg     Patient's weight today:   Vitals:    05/10/24 0802   Weight: 75.5 kg (166 lb 6.4 oz)         weight range for prescribed dose:     Patient weight today falls outside of 10% variance or  weight range: No    [facility] pharmacist informed of weight variance: Not applicable    Doses that are weight based have an acceptable variance rule within 10% of the prescribed   order and/or within  weight range. If patient weight on day of infusion falls   outside of the 10% variance, or weight range, infusion is administered and   pharmacy contacted regarding future dosing adjustments, per policy.         Initiated By: Jaelyn Aggarwal RN   Time: 9:32 AM     We administered golimumab (Simponi Aria) 150 mg in sodium chloride 0.9% 100 mL IV, methylPREDNISolone sodium succinate (PF), and ketorolac.    0920- IV line flushing with 30ml Normal Saline. Patient tolerated infusion well. No signs or symptoms of reaction noted.  Therapy plan complete.

## 2024-05-14 ENCOUNTER — OFFICE VISIT (OUTPATIENT)
Dept: ORTHOPEDIC SURGERY | Facility: CLINIC | Age: 44
End: 2024-05-14
Payer: COMMERCIAL

## 2024-05-14 DIAGNOSIS — G56.22 CUBITAL TUNNEL SYNDROME ON LEFT: Primary | ICD-10-CM

## 2024-05-14 DIAGNOSIS — M77.12 LEFT LATERAL EPICONDYLITIS: ICD-10-CM

## 2024-05-14 PROCEDURE — 99204 OFFICE O/P NEW MOD 45 MIN: CPT | Performed by: ORTHOPAEDIC SURGERY

## 2024-05-14 PROCEDURE — 99214 OFFICE O/P EST MOD 30 MIN: CPT | Performed by: ORTHOPAEDIC SURGERY

## 2024-05-14 PROCEDURE — 1036F TOBACCO NON-USER: CPT | Performed by: ORTHOPAEDIC SURGERY

## 2024-05-14 NOTE — PROGRESS NOTES
"CHIEF COMPLAINT         Left elbow pain    ASSESSMENT + PLAN    Left cubital tunnel syndrome    The nature of cubital tunnel syndrome was reviewed, along with the slowly progressive natural history.  The options for management were reviewed, including night splinting or surgical cubital tunnel release.  The major benefits and risks of surgery were specifically reviewed, as was the postoperative rehabilitation course and the possible need for anterior transposition.    Before trying anything invasive, the patient wanted to try a course of night splinting using an elbow pad or the \"sock trick\".  Proper use was reviewed.  Followup in 4 weeks if things are not improving to their satisfaction.  She may simply contact the office if she would like to schedule cubital tunnel surgery, which would be done at the location of her convenience, under sedation and local.        Left lateral epicondylitis    The nature of lateral epicondylitis, and the long expected duration of symptoms (months) was reviewed, as was the possibility of late recurrence.  The options for treatment were discussed, including conservative and operative care, along with the major risks and benefits of each.    We agreed on obtaining a tennis elbow strap.  I reviewed proper strap position and tightness, and the critical importance of staying below the threshold of pain at all times.  Altered lifting mechanics were reviewed.  Recommendation for nonsteroidals and ice massage as needed for any breakthrough symptoms was reviewed.    If symptoms are continuing beyond 6 weeks without improvement, the patient was instructed to return to clinic to revisit the options of cortisone injection, or surgical intervention.  Contact information for Dr. Weber was also provided so he can review the Tenex procedure as a less invasive option.        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  This is both medial and lateral.  The lateral side is sharp in character with  and pulling, and radiates down the lateral proximal third of the forearm.  The medial is sharp at the elbow and radiates down has an electric shock feeling to the small and ring.  No popping, clicking, or instability.  No recalled trauma.  Elbow pain has been present for about 3 months.  She does have history of rheumatoid arthritis.    She is not diabetic or hypothyroid.  She does not smoke.  She is on daily Percocet 7.5.      REVIEW OF SYSTEMS       A 30-item multi-system Review Of Systems was obtained on today's intake form.  This was reviewed with the patient and is correct.  The pertinent positives and negatives are listed above.  The form has been scanned separately into the medical record.      PHYSICAL EXAM    Constitutional:    Appears stated age. Well-developed and well-nourished female in no acute distress.  Psychiatric:         Pleasant normal mood and affect. Behavior is appropriate for the situation.   Head:                   Normocephalic and atraumatic.  Eyes:                    Pupils are equal and round.  Cardiovascular:  2+ radial and ulnar pulses. Fingers well-perfused.  Respiratory:        Effort normal. No respiratory distress. Speaking in complete sentences.  Neurologic:       Alert and oriented to person, place, and time.  Skin:                Skin is intact, warm and dry.  Hematologic / Lymphatic:    No lymphedema or lymphangitis.    Extremities / Musculoskeletal:                      Skin of the left hand, forearm, elbow is intact with no erythema, ecchymosis, diffuse swelling.  Normal skin drag and coloration.  Good composite finger flexion extension with intact intrinsic plus and minus posture.  Good sagittal plane balance.  Intact symmetric wrist, forearm, elbow range of motion.  A little discomfort with palpation at the lateral epicondyle and just distally, but not as  far as the radial tunnel.  No particular medial tenderness, though she does have positive Tinel at the cubital tunnel and a mildly positive elbow flexion test reproducing that portion of the chief complaint.  No pathologic ulnar motor signs.  Sensation intact to light touch in all distributions.  Capillary refill less than 2 seconds.  Stable elbow collateral exam.  No joint effusion.  Lateral pain is reproduced with resisted wrist extension only if the elbow is extended.      IMAGING / LABS / EMGs           X-rays left elbow from  were independently interpreted by me today and show no acute fracture, subluxation, or foreign body.  The joints are concentric and well-preserved on all views.  There is a tiny olecranon enthesophyte.      Past Medical History:   Diagnosis Date    Asthma ([facility])     Cervicalgia     Chronic pain syndrome     Frequent falls     GERD (gastroesophageal reflux disease)     Knee pain, right     Snoring     Snoring    UTI (urinary tract infection)        Medication Documentation Review Audit       Reviewed by Jaelyn Aggarwal RN (Registered Nurse) on 05/10/24 at 0806      Medication Order Taking? Sig Documenting Provider Last Dose Status   albuterol (Ventolin HFA) 90 mcg/actuation inhaler [id number] No Inhale 1 puff every 4 hours if needed for wheezing (inhale 1-2 puffs every 4 hours as needed.). Dora Zamora MD Taking  24 7829   albuterol 2.5 mg /3 mL (0.083 %) nebulizer solution [id number] No Take 3 mL (2.5 mg) by nebulization every 6 hours if needed for wheezing. Historical Provider, MD Taking Active   benzonatate (Tessalon) 100 mg capsule [id number] No Take 1 capsule (100 mg) by mouth 3 times a day as needed for cough. Do not crush or chew. Historical Provider, MD Taking Active   budesonide (Pulmicort) 0.5 mg/2 mL nebulizer solution [id number] No Inhale 4 mL (1 mg) twice a day. Historical Provider, MD Taking Active   cyclobenzaprine (Flexeril) 10 mg tablet [id number]  No Take 1 tablet (10 mg) by mouth if needed for muscle spasms. Enedina Hernandez MD Taking Active   dupilumab (Dupixent Pen) 300 mg/2 mL injection [id number] No Inject 2 mL (300 mg) under the skin every 14 (fourteen) days. Historical Provider, MD Taking Active   EPINEPHrine (EpiPen 2-Milan) 0.3 mg/0.3 mL injection syringe [id number] No as directed Injection as directed for 1 days Historical Provider, MD Taking Active   ergocalciferol (Vitamin D-2) 1.25 MG (36874 UT) capsule [id number] No Take 1 capsule (50,000 Units) by mouth 1 (one) time per week. Enedina Hernandez MD Taking  24 2359   fexofenadine (Allegra) 180 mg tablet [id number] No  Historical Provider, MD Taking Active   fluticasone (Flonase) 50 mcg/actuation nasal spray [id number] No Administer 1 spray into each nostril once daily. Shake gently. Before first use, prime pump. After use, clean tip and replace cap. Historical Provider, MD Taking Active   folic acid (Folvite) 1 mg tablet [id number] No Take 1 tablet (1 mg) by mouth once daily. Enedina Hernandez MD Taking Active   meclizine (Antivert) 25 mg tablet [id number] No Take 1 tablet (25 mg) by mouth 2 times a day. Historical Provider, MD Taking Active   mometasone-formoterol (Dulera) 200-5 mcg/actuation inhaler [id number] No Inhale 2 puffs twice a day. Historical Provider, MD Not Taking Active     Discontinued 24 1228   oxyCODONE-acetaminophen (Percocet) 7.5-325 mg tablet [id number]  Take 1 tablet by mouth every 6 hours if needed for severe pain (7 - 10) for up to 7 days. Enedina Hernandez MD  Active   sulfaSALAzine (Azulfidine) 500 mg DR tablet [id number] No Take 1 tablet (500 mg) by mouth 2 times a day. Do not crush, chew, or split. Enedina Hernandez MD Taking Active   traMADol (Ultram) 50 mg tablet [id number]  Take 1 tablet (50 mg) by mouth 2 times a day. Enedina Hernandez MD  Active   Vienva 0.1-20 mg-mcg tablet [id number] No  Historical Provider, MD Not Taking Active                     Allergies   Allergen Reactions    Penicillin Shortness of breath    Penicillins Shortness of breath, Anaphylaxis and Swelling     Reaction was when patient was a child. Pts mother told her about it.    Bee Pollen Unknown    House Dust Unknown       Social History     Socioeconomic History    Marital status:      Spouse name: woodrow    Number of children: 4    Years of education: Not on file    Highest education level: High school graduate   Occupational History    Occupation: customer service   Tobacco Use    Smoking status: Never    Smokeless tobacco: Never   Vaping Use    Vaping status: Never Used   Substance and Sexual Activity    Alcohol use: Not Currently    Drug use: Never    Sexual activity: Yes   Other Topics Concern    Not on file   Social History Narrative    Not on file     Social Determinants of Health     Financial Resource Strain: Not on file   Food Insecurity: Unknown (12/9/2023)    Received from     Hunger Vital Sign     Worried About Running Out of Food in the Last Year: Never true     Ran Out of Food in the Last Year: Not on file   Transportation Needs: Not on file   Physical Activity: Not on file   Stress: Not on file   Social Connections: Not on file   Intimate Partner Violence: Not on file   Housing Stability: Not on file       Past Surgical History:   Procedure Laterality Date    APPENDECTOMY  11/21/2013    Appendectomy    HERNIA REPAIR  11/21/2013    Hernia Repair    PATELLAR TENDON REPAIR Left     TUBAL LIGATION  11/21/2013    Tubal Ligation    WISDOM TOOTH EXTRACTION           Electronically Signed      JUSTO Valentino MD      Orthopaedic Hand Surgery      [phone]

## 2024-05-14 NOTE — LETTER
"June 1, 2024     Dora Zamora MD  [address] [facility]  [facility] [id number]    Patient: Susan Ford   YOB: 1980   Date of Visit: 5/14/2024       Dear Dr. Dora Zamora MD:    Thank you for referring Susan Ford to me for evaluation. Below are my notes for this consultation.  If you have questions, please do not hesitate to call me. I look forward to following your patient along with you.       Sincerely,     Warren Valentino MD      CC: No Recipients  ______________________________________________________________________________________    CHIEF COMPLAINT         Left elbow pain    ASSESSMENT + PLAN    Left cubital tunnel syndrome    The nature of cubital tunnel syndrome was reviewed, along with the slowly progressive natural history.  The options for management were reviewed, including night splinting or surgical cubital tunnel release.  The major benefits and risks of surgery were specifically reviewed, as was the postoperative rehabilitation course and the possible need for anterior transposition.    Before trying anything invasive, the patient wanted to try a course of night splinting using an elbow pad or the \"sock trick\".  Proper use was reviewed.  Followup in 4 weeks if things are not improving to their satisfaction.  She may simply contact the office if she would like to schedule cubital tunnel surgery, which would be done at the location of her convenience, under sedation and local.        Left lateral epicondylitis    The nature of lateral epicondylitis, and the long expected duration of symptoms (months) was reviewed, as was the possibility of late recurrence.  The options for treatment were discussed, including conservative and operative care, along with the major risks and benefits of each.    We agreed on obtaining a tennis elbow strap.  I reviewed proper strap position and tightness, and the critical importance of staying below the " threshold of pain at all times.  Altered lifting mechanics were reviewed.  Recommendation for nonsteroidals and ice massage as needed for any breakthrough symptoms was reviewed.    If symptoms are continuing beyond 6 weeks without improvement, the patient was instructed to return to clinic to revisit the options of cortisone injection, or surgical intervention.  Contact information for Dr. Weber was also provided so he can review the Tenex procedure as a less invasive option.        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  This is both medial and lateral.  The lateral side is sharp in character with  and pulling, and radiates down the lateral proximal third of the forearm.  The medial is sharp at the elbow and radiates down has an electric shock feeling to the small and ring.  No popping, clicking, or instability.  No recalled trauma.  Elbow pain has been present for about 3 months.  She does have history of rheumatoid arthritis.    She is not diabetic or hypothyroid.  She does not smoke.  She is on daily Percocet 7.5.      REVIEW OF SYSTEMS       A 30-item multi-system Review Of Systems was obtained on today's intake form.  This was reviewed with the patient and is correct.  The pertinent positives and negatives are listed above.  The form has been scanned separately into the medical record.      PHYSICAL EXAM    Constitutional:    Appears stated age. Well-developed and well-nourished female in no acute distress.  Psychiatric:         Pleasant normal mood and affect. Behavior is appropriate for the situation.   Head:                   Normocephalic and atraumatic.  Eyes:                    Pupils are equal and round.  Cardiovascular:  2+ radial and ulnar pulses. Fingers well-perfused.  Respiratory:        Effort normal. No respiratory distress. Speaking in complete sentences.  Neurologic:        Alert and oriented to person, place, and time.  Skin:                Skin is intact, warm and dry.  Hematologic / Lymphatic:    No lymphedema or lymphangitis.    Extremities / Musculoskeletal:                      Skin of the left hand, forearm, elbow is intact with no erythema, ecchymosis, diffuse swelling.  Normal skin drag and coloration.  Good composite finger flexion extension with intact intrinsic plus and minus posture.  Good sagittal plane balance.  Intact symmetric wrist, forearm, elbow range of motion.  A little discomfort with palpation at the lateral epicondyle and just distally, but not as far as the radial tunnel.  No particular medial tenderness, though she does have positive Tinel at the cubital tunnel and a mildly positive elbow flexion test reproducing that portion of the chief complaint.  No pathologic ulnar motor signs.  Sensation intact to light touch in all distributions.  Capillary refill less than 2 seconds.  Stable elbow collateral exam.  No joint effusion.  Lateral pain is reproduced with resisted wrist extension only if the elbow is extended.      IMAGING / LABS / EMGs           X-rays left elbow from March 28 were independently interpreted by me today and show no acute fracture, subluxation, or foreign body.  The joints are concentric and well-preserved on all views.  There is a tiny olecranon enthesophyte.      Past Medical History:   Diagnosis Date   • Asthma ([facility])    • Cervicalgia    • Chronic pain syndrome    • Frequent falls    • GERD (gastroesophageal reflux disease)    • Knee pain, right    • Snoring     Snoring   • UTI (urinary tract infection)        Medication Documentation Review Audit       Reviewed by Jaelyn Aggarwal RN (Registered Nurse) on 05/10/24 at 0806      Medication Order Taking? Sig Documenting Provider Last Dose Status   albuterol (Ventolin HFA) 90 mcg/actuation inhaler [id number] No Inhale 1 puff every 4 hours if needed for wheezing (inhale 1-2 puffs every 4  hours as needed.). Dora Zamora MD Taking  24 2359   albuterol 2.5 mg /3 mL (0.083 %) nebulizer solution [id number] No Take 3 mL (2.5 mg) by nebulization every 6 hours if needed for wheezing. Historical Provider, MD Taking Active   benzonatate (Tessalon) 100 mg capsule [id number] No Take 1 capsule (100 mg) by mouth 3 times a day as needed for cough. Do not crush or chew. Historical Provider, MD Taking Active   budesonide (Pulmicort) 0.5 mg/2 mL nebulizer solution [id number] No Inhale 4 mL (1 mg) twice a day. Historical Provider, MD Taking Active   cyclobenzaprine (Flexeril) 10 mg tablet [id number] No Take 1 tablet (10 mg) by mouth if needed for muscle spasms. Enedina Hernandez MD Taking Active   dupilumab (Dupixent Pen) 300 mg/2 mL injection [id number] No Inject 2 mL (300 mg) under the skin every 14 (fourteen) days. Historical Provider, MD Taking Active   EPINEPHrine (EpiPen 2-Milan) 0.3 mg/0.3 mL injection syringe [id number] No as directed Injection as directed for 1 days Historical Provider, MD Taking Active   ergocalciferol (Vitamin D-2) 1.25 MG (83579 UT) capsule [id number] No Take 1 capsule (50,000 Units) by mouth 1 (one) time per week. Enedina Hernandez MD Taking  24 2359   fexofenadine (Allegra) 180 mg tablet [id number] No  Historical Provider, MD Taking Active   fluticasone (Flonase) 50 mcg/actuation nasal spray [id number] No Administer 1 spray into each nostril once daily. Shake gently. Before first use, prime pump. After use, clean tip and replace cap. Historical Provider, MD Taking Active   folic acid (Folvite) 1 mg tablet [id number] No Take 1 tablet (1 mg) by mouth once daily. Enedina Hernandez MD Taking Active   meclizine (Antivert) 25 mg tablet [id number] No Take 1 tablet (25 mg) by mouth 2 times a day. Historical Provider, MD Taking Active   mometasone-formoterol (Dulera) 200-5 mcg/actuation inhaler [id number] No Inhale 2 puffs twice a day. Historical Provider, MD Not  Taking Active     Discontinued 05/06/24 1228   oxyCODONE-acetaminophen (Percocet) 7.5-325 mg tablet [id number]  Take 1 tablet by mouth every 6 hours if needed for severe pain (7 - 10) for up to 7 days. Enedina Hernandez MD  Active   sulfaSALAzine (Azulfidine) 500 mg DR tablet [id number] No Take 1 tablet (500 mg) by mouth 2 times a day. Do not crush, chew, or split. Enedina Hernandez MD Taking Active   traMADol (Ultram) 50 mg tablet [id number]  Take 1 tablet (50 mg) by mouth 2 times a day. Enedina Hernandez MD  Active   Vienva 0.1-20 mg-mcg tablet [id number] No  Historical Provider, MD Not Taking Active                    Allergies   Allergen Reactions   • Penicillin Shortness of breath   • Penicillins Shortness of breath, Anaphylaxis and Swelling     Reaction was when patient was a child. Pts mother told her about it.   • Bee Pollen Unknown   • House Dust Unknown       Social History     Socioeconomic History   • Marital status:      Spouse name: woodrow   • Number of children: 4   • Years of education: Not on file   • Highest education level: High school graduate   Occupational History   • Occupation: customer service   Tobacco Use   • Smoking status: Never   • Smokeless tobacco: Never   Vaping Use   • Vaping status: Never Used   Substance and Sexual Activity   • Alcohol use: Not Currently   • Drug use: Never   • Sexual activity: Yes   Other Topics Concern   • Not on file   Social History Narrative   • Not on file     Social Determinants of Health     Financial Resource Strain: Not on file   Food Insecurity: Unknown (12/9/2023)    Received from [facility]    Hunger Vital Sign    • Worried About Running Out of Food in the Last Year: Never true    • Ran Out of Food in the Last Year: Not on file   Transportation Needs: Not on file   Physical Activity: Not on file   Stress: Not on file   Social Connections: Not on file   Intimate Partner Violence: Not on file   Housing Stability: Not on file        Past Surgical History:   Procedure Laterality Date   • APPENDECTOMY  11/21/2013    Appendectomy   • HERNIA REPAIR  11/21/2013    Hernia Repair   • PATELLAR TENDON REPAIR Left    • TUBAL LIGATION  11/21/2013    Tubal Ligation   • WISDOM TOOTH EXTRACTION           Electronically Signed      JUSTO Valentino MD      Orthopaedic Hand Surgery      [phone]

## 2024-05-17 DIAGNOSIS — G89.29 OTHER CHRONIC PAIN: ICD-10-CM

## 2024-05-17 DIAGNOSIS — M06.09 POLYARTHRITIS WITH NEGATIVE RHEUMATOID FACTOR (MULTI): ICD-10-CM

## 2024-05-19 RX ORDER — OXYCODONE AND ACETAMINOPHEN 7.5; 325 MG/1; MG/1
1 TABLET ORAL EVERY 6 HOURS PRN
Qty: 28 TABLET | Refills: 0 | Status: SHIPPED | OUTPATIENT
Start: 2024-05-23 | End: 2024-05-28 | Stop reason: SDUPTHER

## 2024-05-22 ENCOUNTER — PATIENT OUTREACH (OUTPATIENT)
Dept: PRIMARY CARE | Facility: CLINIC | Age: 44
End: 2024-05-22
Payer: COMMERCIAL

## 2024-05-22 DIAGNOSIS — M19.90 ARTHRITIS: ICD-10-CM

## 2024-05-22 DIAGNOSIS — M06.09 SERONEGATIVE RHEUMATOID ARTHRITIS OF MULTIPLE SITES (MULTI): ICD-10-CM

## 2024-05-22 PROCEDURE — 99490 CHRNC CARE MGMT STAFF 1ST 20: CPT | Performed by: STUDENT IN AN ORGANIZED HEALTH CARE EDUCATION/TRAINING PROGRAM

## 2024-05-22 NOTE — PROGRESS NOTES
Patient continues to have difficulty working as she talking all day on phone is causing exacerbation of asthma and is no longer being controlled with rescue inhaler.  Only thing that helps is to be on nebulizer but cannot do that while talking on phone.  Confirmed using daily Dulera inhaler as maintenance medication.  Also receiving Dupixent injections.  Seeing ortho for pinched nerve in neck and bone spurs in ankle and elbow and told only surgeon who can help is part of [employer] team and not available at this time.  Patient and I discussed risk of surgery while breathing in compromised state.  She has not received letter reportedly mailed to her from Dr. Garcia stating her work restrictions and needing nonspeaking role. She will also discuss at next visit with allergist Dr. Lo and Dr. Sandoval on 6/4/2024.  At that visit will have paperwork for [employer] and [employer] application.  Suggested she also consider FMLA since so many different things going on now.  It will not pay her while out of work but protect her job while she recovers.  Encouraged patient to reach out if any concerns before she sees PCP in few weeks.

## 2024-05-23 PROBLEM — M25.529 ELBOW PAIN: Status: ACTIVE | Noted: 2024-05-23

## 2024-05-23 PROBLEM — G56.22 ULNAR NEUROPATHY OF LEFT UPPER EXTREMITY: Status: ACTIVE | Noted: 2024-05-23

## 2024-05-23 PROBLEM — M25.579 ANKLE PAIN: Status: ACTIVE | Noted: 2024-05-23

## 2024-05-23 PROBLEM — M35.3 POLYMYALGIA RHEUMATICA (MULTI): Status: ACTIVE | Noted: 2024-05-23

## 2024-05-23 PROBLEM — R10.33 PERIUMBILICAL ABDOMINAL PAIN: Status: ACTIVE | Noted: 2024-05-23

## 2024-05-23 PROBLEM — R26.9 ABNORMAL GAIT: Status: ACTIVE | Noted: 2024-01-10

## 2024-05-24 ENCOUNTER — TELEPHONE (OUTPATIENT)
Dept: PRIMARY CARE | Facility: CLINIC | Age: 44
End: 2024-05-24
Payer: COMMERCIAL

## 2024-05-24 DIAGNOSIS — J45.909 ASTHMA, UNSPECIFIED ASTHMA SEVERITY, UNSPECIFIED WHETHER COMPLICATED, UNSPECIFIED WHETHER PERSISTENT (HHS-HCC): Primary | ICD-10-CM

## 2024-05-24 NOTE — TELEPHONE ENCOUNTER
States she has a nebulizer machine at home to help her with her asthma when her inhaler does not help. Thee tubing for her nebulizer has a hole in it she is asking for rx replacement for tubing

## 2024-05-28 ENCOUNTER — PATIENT MESSAGE (OUTPATIENT)
Dept: RHEUMATOLOGY | Facility: CLINIC | Age: 44
End: 2024-05-28
Payer: COMMERCIAL

## 2024-05-28 DIAGNOSIS — M06.09 POLYARTHRITIS WITH NEGATIVE RHEUMATOID FACTOR (MULTI): ICD-10-CM

## 2024-05-28 DIAGNOSIS — G89.29 OTHER CHRONIC PAIN: ICD-10-CM

## 2024-05-29 RX ORDER — NALOXONE HYDROCHLORIDE 4 MG/.1ML
1 SPRAY NASAL AS NEEDED
Qty: 2 EACH | Refills: 0 | Status: SHIPPED | OUTPATIENT
Start: 2024-05-29

## 2024-05-29 RX ORDER — OXYCODONE AND ACETAMINOPHEN 7.5; 325 MG/1; MG/1
1 TABLET ORAL EVERY 6 HOURS PRN
Qty: 112 TABLET | Refills: 0 | Status: SHIPPED | OUTPATIENT
Start: 2024-05-29

## 2024-05-29 NOTE — TELEPHONE ENCOUNTER
From: Susan Ford  To: Enedina Garcia Mary  Sent: 5/28/2024 12:04 PM EDT  Subject: Letter for accommodation     Hello i received the letter in the mail my job will not accept that letter written with the void in the background can i please get a letter written on a letterhead that is more visible please this is a urgent matter i am gonna be suspended from my job if i don’t get this information

## 2024-05-30 ENCOUNTER — LAB (OUTPATIENT)
Dept: LAB | Facility: LAB | Age: 44
End: 2024-05-30
Payer: COMMERCIAL

## 2024-05-30 DIAGNOSIS — R10.33 PERIUMBILICAL ABDOMINAL PAIN: ICD-10-CM

## 2024-05-30 DIAGNOSIS — Z87.19 HISTORY OF VENTRAL HERNIA REPAIR: ICD-10-CM

## 2024-05-30 DIAGNOSIS — Z98.890 HISTORY OF VENTRAL HERNIA REPAIR: ICD-10-CM

## 2024-05-30 DIAGNOSIS — E55.9 VITAMIN D DEFICIENCY: ICD-10-CM

## 2024-05-30 DIAGNOSIS — M06.09 POLYARTHRITIS WITH NEGATIVE RHEUMATOID FACTOR (MULTI): ICD-10-CM

## 2024-05-30 LAB
25(OH)D3 SERPL-MCNC: 43 NG/ML (ref 30–100)
ALBUMIN SERPL BCP-MCNC: 4.2 G/DL (ref 3.4–5)
ALP SERPL-CCNC: 49 U/L (ref 33–110)
ALT SERPL W P-5'-P-CCNC: 10 U/L (ref 7–45)
ANION GAP SERPL CALC-SCNC: 11 MMOL/L (ref 10–20)
AST SERPL W P-5'-P-CCNC: 13 U/L (ref 9–39)
BASOPHILS # BLD AUTO: 0.03 X10*3/UL (ref 0–0.1)
BASOPHILS NFR BLD AUTO: 0.5 %
BILIRUB SERPL-MCNC: 0.4 MG/DL (ref 0–1.2)
BUN SERPL-MCNC: 9 MG/DL (ref 6–23)
CALCIUM SERPL-MCNC: 9.6 MG/DL (ref 8.6–10.6)
CHLORIDE SERPL-SCNC: 106 MMOL/L (ref 98–107)
CK SERPL-CCNC: 80 U/L (ref 0–215)
CO2 SERPL-SCNC: 28 MMOL/L (ref 21–32)
CREAT SERPL-MCNC: 0.77 MG/DL (ref 0.5–1.05)
CRP SERPL-MCNC: <0.1 MG/DL
EGFRCR SERPLBLD CKD-EPI 2021: >90 ML/MIN/1.73M*2
EOSINOPHIL # BLD AUTO: 0.07 X10*3/UL (ref 0–0.7)
EOSINOPHIL NFR BLD AUTO: 1.2 %
ERYTHROCYTE [DISTWIDTH] IN BLOOD BY AUTOMATED COUNT: 12.9 % (ref 11.5–14.5)
ERYTHROCYTE [SEDIMENTATION RATE] IN BLOOD BY WESTERGREN METHOD: 7 MM/H (ref 0–20)
GLUCOSE SERPL-MCNC: 89 MG/DL (ref 74–99)
HCT VFR BLD AUTO: 39 % (ref 36–46)
HGB BLD-MCNC: 12.2 G/DL (ref 12–16)
IMM GRANULOCYTES # BLD AUTO: 0.02 X10*3/UL (ref 0–0.7)
IMM GRANULOCYTES NFR BLD AUTO: 0.3 % (ref 0–0.9)
LYMPHOCYTES # BLD AUTO: 2.5 X10*3/UL (ref 1.2–4.8)
LYMPHOCYTES NFR BLD AUTO: 43.5 %
MCH RBC QN AUTO: 27.3 PG (ref 26–34)
MCHC RBC AUTO-ENTMCNC: 31.3 G/DL (ref 32–36)
MCV RBC AUTO: 87 FL (ref 80–100)
MONOCYTES # BLD AUTO: 0.39 X10*3/UL (ref 0.1–1)
MONOCYTES NFR BLD AUTO: 6.8 %
NEUTROPHILS # BLD AUTO: 2.74 X10*3/UL (ref 1.2–7.7)
NEUTROPHILS NFR BLD AUTO: 47.7 %
NRBC BLD-RTO: 0 /100 WBCS (ref 0–0)
PLATELET # BLD AUTO: 281 X10*3/UL (ref 150–450)
POTASSIUM SERPL-SCNC: 4.1 MMOL/L (ref 3.5–5.3)
PROT SERPL-MCNC: 6.7 G/DL (ref 6.4–8.2)
RBC # BLD AUTO: 4.47 X10*6/UL (ref 4–5.2)
SODIUM SERPL-SCNC: 141 MMOL/L (ref 136–145)
WBC # BLD AUTO: 5.8 X10*3/UL (ref 4.4–11.3)

## 2024-05-30 PROCEDURE — 82306 VITAMIN D 25 HYDROXY: CPT

## 2024-05-30 PROCEDURE — 86140 C-REACTIVE PROTEIN: CPT

## 2024-05-30 PROCEDURE — 82164 ANGIOTENSIN I ENZYME TEST: CPT

## 2024-05-30 PROCEDURE — 36415 COLL VENOUS BLD VENIPUNCTURE: CPT

## 2024-05-30 PROCEDURE — 83529 ASAY OF INTERLEUKIN-6 (IL-6): CPT

## 2024-05-30 PROCEDURE — 82550 ASSAY OF CK (CPK): CPT

## 2024-05-30 PROCEDURE — 85652 RBC SED RATE AUTOMATED: CPT

## 2024-05-30 PROCEDURE — 85025 COMPLETE CBC W/AUTO DIFF WBC: CPT

## 2024-05-30 PROCEDURE — 80053 COMPREHEN METABOLIC PANEL: CPT

## 2024-05-30 PROCEDURE — 82652 VIT D 1 25-DIHYDROXY: CPT

## 2024-06-01 PROBLEM — M77.12 LEFT LATERAL EPICONDYLITIS: Status: ACTIVE | Noted: 2024-06-01

## 2024-06-01 LAB
1,25(OH)2D3 SERPL-MCNC: 108 PG/ML (ref 19.9–79.3)
ACE SERPL-CCNC: 68 U/L (ref 16–85)
IL6 SERPL-MCNC: <2 PG/ML

## 2024-06-03 RX ORDER — VALACYCLOVIR HYDROCHLORIDE 1 G/1
1000 TABLET, FILM COATED ORAL DAILY
COMMUNITY
Start: 2024-04-30

## 2024-06-03 RX ORDER — PREDNISONE 10 MG/1
TABLET ORAL
COMMUNITY
Start: 2024-04-03

## 2024-06-03 RX ORDER — FLUTICASONE FUROATE, UMECLIDINIUM BROMIDE AND VILANTEROL TRIFENATATE 200; 62.5; 25 UG/1; UG/1; UG/1
1 POWDER RESPIRATORY (INHALATION)
COMMUNITY
Start: 2024-05-30

## 2024-06-04 ENCOUNTER — APPOINTMENT (OUTPATIENT)
Dept: PRIMARY CARE | Facility: CLINIC | Age: 44
End: 2024-06-04
Payer: COMMERCIAL

## 2024-06-07 ENCOUNTER — APPOINTMENT (OUTPATIENT)
Dept: INFUSION THERAPY | Facility: CLINIC | Age: 44
End: 2024-06-07
Payer: COMMERCIAL

## 2024-06-10 LAB
SCAN RESULT: NORMAL
SCAN RESULT: NORMAL

## 2024-06-13 ENCOUNTER — OFFICE VISIT (OUTPATIENT)
Dept: SURGERY | Facility: CLINIC | Age: 44
End: 2024-06-13
Payer: COMMERCIAL

## 2024-06-13 ENCOUNTER — OFFICE VISIT (OUTPATIENT)
Dept: RHEUMATOLOGY | Facility: CLINIC | Age: 44
End: 2024-06-13
Payer: COMMERCIAL

## 2024-06-13 VITALS
WEIGHT: 172.62 LBS | DIASTOLIC BLOOD PRESSURE: 65 MMHG | HEART RATE: 65 BPM | OXYGEN SATURATION: 99 % | BODY MASS INDEX: 27.74 KG/M2 | HEIGHT: 66 IN | SYSTOLIC BLOOD PRESSURE: 112 MMHG

## 2024-06-13 VITALS
OXYGEN SATURATION: 99 % | HEART RATE: 70 BPM | HEIGHT: 65 IN | TEMPERATURE: 98.9 F | WEIGHT: 172 LBS | BODY MASS INDEX: 28.66 KG/M2 | SYSTOLIC BLOOD PRESSURE: 90 MMHG | DIASTOLIC BLOOD PRESSURE: 60 MMHG

## 2024-06-13 DIAGNOSIS — E55.9 VITAMIN D DEFICIENCY: ICD-10-CM

## 2024-06-13 DIAGNOSIS — Z79.52 LONG TERM (CURRENT) USE OF SYSTEMIC STEROIDS: ICD-10-CM

## 2024-06-13 DIAGNOSIS — Z87.19 HISTORY OF VENTRAL HERNIA REPAIR: Primary | ICD-10-CM

## 2024-06-13 DIAGNOSIS — Z98.890 HISTORY OF VENTRAL HERNIA REPAIR: Primary | ICD-10-CM

## 2024-06-13 DIAGNOSIS — R10.33 PERIUMBILICAL ABDOMINAL PAIN: ICD-10-CM

## 2024-06-13 DIAGNOSIS — M06.09 POLYARTHRITIS WITH NEGATIVE RHEUMATOID FACTOR (MULTI): Primary | ICD-10-CM

## 2024-06-13 DIAGNOSIS — Z79.899 ENCOUNTER FOR LONG-TERM (CURRENT) USE OF MEDICATIONS: ICD-10-CM

## 2024-06-13 PROCEDURE — 99213 OFFICE O/P EST LOW 20 MIN: CPT | Performed by: STUDENT IN AN ORGANIZED HEALTH CARE EDUCATION/TRAINING PROGRAM

## 2024-06-13 PROCEDURE — 99213 OFFICE O/P EST LOW 20 MIN: CPT | Performed by: INTERNAL MEDICINE

## 2024-06-13 PROCEDURE — 99203 OFFICE O/P NEW LOW 30 MIN: CPT | Performed by: STUDENT IN AN ORGANIZED HEALTH CARE EDUCATION/TRAINING PROGRAM

## 2024-06-13 RX ORDER — LIDOCAINE 50 MG/G
1 PATCH TOPICAL DAILY
Qty: 30 PATCH | Refills: 11 | Status: SHIPPED | OUTPATIENT
Start: 2024-06-13

## 2024-06-13 ASSESSMENT — PATIENT HEALTH QUESTIONNAIRE - PHQ9
1. LITTLE INTEREST OR PLEASURE IN DOING THINGS: NOT AT ALL
SUM OF ALL RESPONSES TO PHQ9 QUESTIONS 1 AND 2: 0
1. LITTLE INTEREST OR PLEASURE IN DOING THINGS: NOT AT ALL
2. FEELING DOWN, DEPRESSED OR HOPELESS: NOT AT ALL
SUM OF ALL RESPONSES TO PHQ9 QUESTIONS 1 AND 2: 0
2. FEELING DOWN, DEPRESSED OR HOPELESS: NOT AT ALL

## 2024-06-13 ASSESSMENT — ENCOUNTER SYMPTOMS
LOSS OF SENSATION IN FEET: 0
OCCASIONAL FEELINGS OF UNSTEADINESS: 0
LOSS OF SENSATION IN FEET: 0
DEPRESSION: 0
OCCASIONAL FEELINGS OF UNSTEADINESS: 1
DEPRESSION: 0

## 2024-06-13 ASSESSMENT — ROUTINE ASSESSMENT OF PATIENT INDEX DATA (RAPID3)
WALK_FLAT_GROUND: WITH MUCH DIFFICULTY
GOOD_NIGHTS_SLEEP: WITH MUCH DIFFICULTY
LIFT_CUP_TO_MOUTH: WITH MUCH DIFFICULTY
SUM OF QUESTIONS A TO J: 20
FEELINGS_ANXIETY_NERVOUS: WITH MUCH DIFFICULTY
ON A SCALE OF ONE TO TEN, CONSIDERING ALL THE WAYS IN WHICH ILLNESS AND HEALTH CONDITIONS MAY AFFECT YOU AT THIS TIME, PLEASE INDICATE BELOW HOW YOU ARE DOING:: 9.5
WASH_DRY_BODY: WITH MUCH DIFFICULTY
IN_OUT_BED: WITH MUCH DIFFICULTY
TURN_FAUCETS_OFF: WITH MUCH DIFFICULTY
FN_SCORE: 6.7
PARTIPATE_RECREATIONAL_ACTIVITIES: WITH MUCH DIFFICULTY
WALK_KILOMETERS: WITH MUCH DIFFICULTY
PICK_CLOTHES_OFF_FLOOR: WITH MUCH DIFFICULTY
DRESS_YOURSELF: WITH MUCH DIFFICULTY
ON A SCALE OF ONE TO TEN, CONSIDERING ALL THE WAYS IN WHICH ILLNESS AND HEALTH CONDITIONS MAY AFFECT YOU AT THIS TIME, PLEASE INDICATE BELOW HOW YOU ARE DOING:: 9.5
FEELINGS_DEPRESSION: WITH MUCH DIFFICULTY
IN_OUT_TRANSPORT: WITH MUCH DIFFICULTY

## 2024-06-13 ASSESSMENT — PAIN SCALES - GENERAL
PAINLEVEL: 2
PAINLEVEL_OUTOF10: 6

## 2024-06-13 ASSESSMENT — PAIN - FUNCTIONAL ASSESSMENT: PAIN_FUNCTIONAL_ASSESSMENT: 0-10

## 2024-06-13 NOTE — PROGRESS NOTES
[facility]/  Rheumatology  [address], [address]  [place] [address]  Phone: [phone]  Fax: [phone]    Rheumatology Progress Note 6/13/24    Susan Ford is a 44 y.o. female here for follow up.    Last Visit: 2/6/24    Rheum Hx      Previous Tx    Health Maintenance  DXA- none  Malignancy Hx- none  Immunization History   Administered Date(s) Administered    Flu vaccine (IIV4), preservative free *Check age/dose* 09/17/2020    Influenza, injectable, quadrivalent 10/14/2020    PPD Test 09/20/2019    Pneumococcal conjugate vaccine, 20-valent (PREVNAR 20) 06/21/2023    Td (adult) 02/26/2011    Tdap vaccine, age 7 year and older (BOOSTRIX, ADACEL) 09/17/2020, 11/10/2020          Past Medical History:   Diagnosis Date    Asthma ([facility])     Cervicalgia     Chronic pain syndrome     Frequent falls     GERD (gastroesophageal reflux disease)     Knee pain, right     Snoring     Snoring    UTI (urinary tract infection)       Past Surgical History:   Procedure Laterality Date    APPENDECTOMY  11/21/2013    Appendectomy    HERNIA REPAIR  11/21/2013    Hernia Repair    PATELLAR TENDON REPAIR Left     TUBAL LIGATION  11/21/2013    Tubal Ligation    WISDOM TOOTH EXTRACTION        Current Outpatient Medications   Medication Sig Dispense Refill    albuterol 2.5 mg /3 mL (0.083 %) nebulizer solution Take 3 mL (2.5 mg) by nebulization every 6 hours if needed for wheezing.      benzonatate (Tessalon) 100 mg capsule Take 1 capsule (100 mg) by mouth 3 times a day as needed for cough. Do not crush or chew.      budesonide (Pulmicort) 0.5 mg/2 mL nebulizer solution Inhale 4 mL (1 mg) twice a day.      cyclobenzaprine (Flexeril) 10 mg tablet Take 1 tablet (10 mg) by mouth if needed for muscle spasms. 90 tablet 3    dupilumab (Dupixent Pen) 300 mg/2 mL injection Inject 2 mL (300 mg) under the skin every 14 (fourteen) days.      EPINEPHrine (EpiPen 2-Milan) 0.3 mg/0.3 mL injection syringe as  directed Injection as directed for 1 days      fexofenadine (Allegra) 180 mg tablet       fluticasone (Flonase) 50 mcg/actuation nasal spray Administer 1 spray into each nostril once daily. Shake gently. Before first use, prime pump. After use, clean tip and replace cap.      fluticasone-umeclidin-vilanter (Trelegy Ellipta) 200-62.5-25 mcg blister with device Inhale 1 puff once daily.      folic acid (Folvite) 1 mg tablet Take 1 tablet (1 mg) by mouth once daily. 90 tablet 3    meclizine (Antivert) 25 mg tablet Take 1 tablet (25 mg) by mouth 2 times a day.      mometasone-formoterol (Dulera) 200-5 mcg/actuation inhaler Inhale 2 puffs twice a day.      naloxone (Narcan) 4 mg/0.1 mL nasal spray Administer 1 spray (4 mg) into affected nostril(s) if needed for opioid reversal. May repeat in 2-3 minutes if needed, alternating nostrils 2 each 0    nebulizer accessories misc Use as directed with nebulizer machine. 1 each 0    oxyCODONE-acetaminophen (Percocet) 7.5-325 mg tablet Take 1 tablet by mouth every 6 hours if needed for severe pain (7 - 10). 112 tablet 0    predniSONE (Deltasone) 10 mg tablet TAKE 3 TABLETS BY MOUTH ONCE DAILY FOR 1 DAY  THEN TAKE 3 TABLETS ONCE DAILY FOR 1 DAY  THEN TAKE 2 TABLETS ONCE DAILY FOR 1 DAY  THEN TAKE      sulfaSALAzine (Azulfidine) 500 mg DR tablet Take 1 tablet (500 mg) by mouth 2 times a day. Do not crush, chew, or split. 180 tablet 3    traMADol (Ultram) 50 mg tablet Take 1 tablet (50 mg) by mouth 2 times a day. 60 tablet 3    valACYclovir (Valtrex) 1 gram tablet Take 1 tablet (1,000 mg) by mouth once daily.      albuterol (Ventolin HFA) 90 mcg/actuation inhaler Inhale 1 puff every 4 hours if needed for wheezing (inhale 1-2 puffs every 4 hours as needed.). 18 g 2     No current facility-administered medications for this visit.      Allergies   Allergen Reactions    Penicillin Shortness of breath    Penicillins Shortness of breath, Anaphylaxis and Swelling     Reaction was when  "patient was a child. Pts mother told her about it.    Bee Pollen Unknown    House Dust Unknown        Vitals:    06/13/24 1300   BP: 112/65   BP Location: Right arm   Patient Position: Sitting   Pulse: 65   SpO2: 99%   Weight: 78.3 kg (172 lb 9.9 oz)   Height: 1.664 m (5' 5.5\")       Pain Assessment Pain Score: 6, Pain Location: Generalized     Rapid 3                    Workup  Component      Latest Ref Rng 5/30/2024   LEUKOCYTES (10*3/UL) IN BLOOD BY AUTOMATED COUNT, [place]      4.4 - 11.3 x10*3/uL 5.8    nRBC      0.0 - 0.0 /100 WBCs 0.0    ERYTHROCYTES (10*6/UL) IN BLOOD BY AUTOMATED COUNT, [place]      4.00 - 5.20 x10*6/uL 4.47    HEMOGLOBIN      12.0 - 16.0 g/dL 12.2    HEMATOCRIT      36.0 - 46.0 % 39.0    MCV      80 - 100 fL 87    MCH      26.0 - 34.0 pg 27.3    MCHC      32.0 - 36.0 g/dL 31.3 (L)    RED CELL DISTRIBUTION WIDTH      11.5 - 14.5 % 12.9    PLATELETS (10*3/UL) IN BLOOD AUTOMATED COUNT, [place]      150 - 450 x10*3/uL 281    NEUTROPHILS/100 LEUKOCYTES IN BLOOD BY AUTOMATED COUNT, [place]      40.0 - 80.0 % 47.7    Immature Granulocytes %, Automated      0.0 - 0.9 % 0.3    Lymphocytes %      13.0 - 44.0 % 43.5    Monocytes %      2.0 - 10.0 % 6.8    Eosinophils %      0.0 - 6.0 % 1.2    Basophils %      0.0 - 2.0 % 0.5    NEUTROPHILS (10*3/UL) IN BLOOD BY AUTOMATED COUNT, [place]      1.20 - 7.70 x10*3/uL 2.74    Immature Granulocytes Absolute, Automated      0.00 - 0.70 x10*3/uL 0.02    Lymphocytes Absolute      1.20 - 4.80 x10*3/uL 2.50    Monocytes Absolute      0.10 - 1.00 x10*3/uL 0.39    Eosinophils Absolute      0.00 - 0.70 x10*3/uL 0.07    Basophils Absolute      0.00 - 0.10 x10*3/uL 0.03    GLUCOSE      74 - 99 mg/dL 89    SODIUM      136 - 145 mmol/L 141    POTASSIUM      3.5 - 5.3 mmol/L 4.1    CHLORIDE      98 - 107 mmol/L 106    Bicarbonate      21 - 32 mmol/L 28    Anion Gap      10 - 20 mmol/L 11    Blood Urea Nitrogen      6 - 23 mg/dL 9    Creatinine      0.50 - 1.05 mg/dL " 0.77    EGFR      >60 mL/min/1.73m*2 >90    Calcium      8.6 - 10.6 mg/dL 9.6    Albumin      3.4 - 5.0 g/dL 4.2    Alkaline Phosphatase      33 - 110 U/L 49    Total Protein      6.4 - 8.2 g/dL 6.7    AST      9 - 39 U/L 13    Bilirubin Total      0.0 - 1.2 mg/dL 0.4    ALT      7 - 45 U/L 10    C-Reactive Protein      <1.00 mg/dL <0.10    Creatine Kinase      0 - 215 U/L 80    Sed Rate      0 - 20 mm/h 7    Vit D, 1,25-Dihydroxy      19.9 - 79.3 pg/mL 108.0 (H)    Vitamin D, 25-Hydroxy, Total      30 - 100 ng/mL 43    Interleukin 6      <=2.0 pg/mL <2.0    Scan Result Vectra 16 (low)   Scan Result 14.3.3 neg   Angio Converting Enzyme      16 - 85 U/L 68             Assessment/Plan  1. Polyarthritis with negative rheumatoid factor (Multi)    2. Long term (current) use of systemic steroids    3. Encounter for long-term (current) use of medications    4. Vitamin D deficiency         Orders Placed This Encounter   Procedures    CBC and Auto Differential    Comprehensive Metabolic Panel    C-Reactive Protein    Creatine Kinase    Urinalysis with Reflex Culture and Microscopic    Vitamin D 1,25 Dihydroxy (for eval of hypercalcemia)    Vitamin D 25-Hydroxy,Total (for eval of Vitamin D levels)    Sedimentation Rate    Angiotensin Converting Enzyme          Since last appt, adherent and tolerating SSZ 1g, Simponi- 2 doses so far- some help  On prednisone and budesonide.  Trellegy   Denies any recent or current infection.  Not on any NSAIDs.  ROS+ for fatigue, ONOFRE, cough, GI issues, joint pain/swelling/stiffness, back pain, weakness hands.   Rapid 3 consistent with  Labs reviewed  D/w pt tx options  Advised of possible side effects and importance of monitoring.   All questions answered.  Patient to follow up with primary care provider regarding all other medical issues not addressed today and for medical chart updating.     Enedina Hernandez MD      Patient Care Team:  Dora Zamora MD as PCP - General (Family  Medicine)  Evon Irizarry DO as PCP - [facility] Medicaid PCP  Deborah Leija as Care Manager (Case Management)  Enedina Hernandez MD as Consulting Physician (Rheumatology)

## 2024-06-14 ASSESSMENT — ENCOUNTER SYMPTOMS
FACIAL ASYMMETRY: 0
DYSURIA: 0
FEVER: 0
UNEXPECTED WEIGHT CHANGE: 0
SPEECH DIFFICULTY: 0
HEADACHES: 0
BRUISES/BLEEDS EASILY: 0
NAUSEA: 0
VOMITING: 0
SORE THROAT: 0
CHILLS: 0
ADENOPATHY: 0
SHORTNESS OF BREATH: 0
DIARRHEA: 0
BLOOD IN STOOL: 0
CHEST TIGHTNESS: 0
WOUND: 0
ABDOMINAL PAIN: 1
PALPITATIONS: 0
ARTHRALGIAS: 1
HEMATURIA: 0
TROUBLE SWALLOWING: 0
VOICE CHANGE: 0

## 2024-06-14 NOTE — PROGRESS NOTES
History Of Present Illness  Susan Ford is a 44 y.o. female presenting for evaluation of a recurrent umbilical hernia.  She reports she has had 2 previous repairs, 1 with suture and the subsequent one with mesh and these were several years ago at the [facility].  She reports a few months ago she picked up her grandson and had sharp pain in her abdomen that felt like a recurrent hernia.  She was seen by her primary care who ordered a CT scan and is here today to follow-up on the results.  Continues to have some pain around her umbilicus,   But does not feel the bulge like she had previously with her hernia.  No nausea or vomiting, normal bowel movements.     Past Medical History  She has a past medical history of Asthma ([facility]), Cervicalgia, Chronic pain syndrome, Frequent falls, GERD (gastroesophageal reflux disease), Knee pain, right, Snoring, and UTI (urinary tract infection).    Surgical History  She has a past surgical history that includes Tubal ligation (11/21/2013); Hernia repair (11/21/2013); Appendectomy (11/21/2013); [place] tooth extraction; and Patellar tendon repair (Left).     Social History  She reports that she has never smoked. She has never used smokeless tobacco. She reports that she does not currently use alcohol. She reports that she does not use drugs.    Family History  Family History   Problem Relation Name Age of Onset    Stroke Father      Other (htn) Father      No Known Problems Brother          Allergies  Penicillin, Penicillins, Bee pollen, and House dust    Review of Systems   Constitutional:  Negative for chills, fever and unexpected weight change.   HENT:  Negative for sneezing, sore throat, trouble swallowing and voice change.    Respiratory:  Negative for chest tightness and shortness of breath.    Cardiovascular:  Negative for chest pain and palpitations.   Gastrointestinal:  Positive for abdominal pain. Negative for blood in stool, diarrhea, nausea and vomiting.  "  Endocrine: Negative for cold intolerance and heat intolerance.   Genitourinary:  Negative for decreased urine volume, dysuria and hematuria.   Musculoskeletal:  Positive for arthralgias and gait problem.   Skin:  Negative for rash and wound.   Neurological:  Negative for facial asymmetry, speech difficulty and headaches.   Hematological:  Negative for adenopathy. Does not bruise/bleed easily.   Psychiatric/Behavioral:  Negative for self-injury and suicidal ideas.         Physical Exam  Vitals and nursing note reviewed.   Constitutional:       Appearance: Normal appearance.   HENT:      Head: Normocephalic and atraumatic.      Mouth/Throat:      Mouth: Mucous membranes are moist.      Pharynx: Oropharynx is clear.   Eyes:      Extraocular Movements: Extraocular movements intact.      Pupils: Pupils are equal, round, and reactive to light.   Cardiovascular:      Rate and Rhythm: Normal rate and regular rhythm.      Pulses: Normal pulses.   Pulmonary:      Effort: Pulmonary effort is normal.      Breath sounds: Normal breath sounds.   Abdominal:      General: There is no distension.      Palpations: Abdomen is soft.      Tenderness: There is no abdominal tenderness.      Comments:   Previous incision over her umbilicus, no palpable fascial defect or hernia protrusion   Musculoskeletal:      Cervical back: Normal range of motion and neck supple.   Skin:     General: Skin is warm and dry.   Neurological:      General: No focal deficit present.      Mental Status: She is alert and oriented to person, place, and time.   Psychiatric:         Mood and Affect: Mood normal.         Behavior: Behavior normal.          Last Recorded Vitals  Blood pressure 90/60, pulse 70, temperature 37.2 °C (98.9 °F), height 1.638 m (5' 4.5\"), weight 78 kg (172 lb), SpO2 99%.    Relevant Results  CT A/P 5/9/24 IMPRESSION:  1. Postsurgical changes of anterior abdominal wall hernia repair with  a small left paramedian 5-6 mm recurrent " hernia.  2. Tubular structure in the left adnexa with simple fluid density  likely represents a simple cyst versus hydrosalpinx. Correlation with  pelvic ultrasound can be considered for further characterization.  3. Additional findings as above.     Assessment/Plan   Problem List Items Addressed This Visit             ICD-10-CM       Gastrointestinal and Abdominal    Periumbilical abdominal pain R10.33     Other Visit Diagnoses         Codes    History of ventral hernia repair    -  Primary Z98.890, Z87.19         44-year-old female with periumbilical abdominal pain and a history of several umbilical hernia repairs.  She has no evidence of hernia on exam and the CT scan shows an approximately 5 mm fascial defect just to the left of midline.  There is no evidence of bowel or fat in the defect.  I do not think that she has an actual recurrence of the hernia.  I think that during the lifting of her grandson, she likely  the fascia in that area, but because there is mesh placed it prevented a hernia from protruding through the defect.  the pain is likely acute on chronic from this fascial separation and should improve over time.  Can use a heat or ice pack as needed over top, and she reports she already has pain medication.  No diet or activity restrictions.  Can follow-up as needed.        Ingris Handley MD

## 2024-06-18 ENCOUNTER — PATIENT OUTREACH (OUTPATIENT)
Dept: PRIMARY CARE | Facility: CLINIC | Age: 44
End: 2024-06-18
Payer: COMMERCIAL

## 2024-06-18 ENCOUNTER — PATIENT MESSAGE (OUTPATIENT)
Dept: RHEUMATOLOGY | Facility: CLINIC | Age: 44
End: 2024-06-18

## 2024-06-18 DIAGNOSIS — M19.90 CHRONIC ARTHRITIS: ICD-10-CM

## 2024-06-18 DIAGNOSIS — M06.09 RHEUMATOID ARTHRITIS OF MULTIPLE SITES WITHOUT RHEUMATOID FACTOR (MULTI): ICD-10-CM

## 2024-06-19 NOTE — PROGRESS NOTES
Update from patient received that she has her work accomodation letter but no word yet if employer will approve request to work 4 days a week allowing day for recovery and medical appointments.  Dr. Lo [facility] doesn't seem to agree that talking is a trigger instead that she has reactive asthma that is not under control.  Has started her on trial of Trelegy daily and continuing with Budesonide nebulizer twice a day.  Her Symponi for June 7th was cancelled by [facility] for unknown reason and having issues getting new order to them; otherwise won't receive until July 5th.  She already is feeling results of missed dose and may be calling Dr. Garcia for steroid pack and Toradol shot.  Patient also endorses core weakness from sitting all day and believes it Pyriformis Syndrome after searching symptoms on internet.  She has PT order from Rheumatology so this may be discussed with them when she starts also suggested some core strengthening exercises and walking or modified squats on break. Once she is established with PT she can complete process to get her scooter from insurance.  CM will continue to follow and support patient in managing chronic health conditions.

## 2024-06-25 DIAGNOSIS — G89.29 OTHER CHRONIC PAIN: ICD-10-CM

## 2024-06-25 DIAGNOSIS — M06.09 POLYARTHRITIS WITH NEGATIVE RHEUMATOID FACTOR (MULTI): ICD-10-CM

## 2024-06-25 NOTE — TELEPHONE ENCOUNTER
"PT CALLED TO REQUEST REFILL OF OXYCODONE 7.5/325. STATES SHE IS CALLING EARLY SO SHE DOESN'T FORGET. ALSO ASKING FOR ORDERS FOR \"SHOTS\" IF SHE NEEDS THEM SINCE SHE DOESN'T GET INFUSION UNTIL 7/5/24.  "

## 2024-06-26 ENCOUNTER — TRANSCRIBE ORDERS (OUTPATIENT)
Dept: INFUSION THERAPY | Facility: CLINIC | Age: 44
End: 2024-06-26
Payer: COMMERCIAL

## 2024-06-26 RX ORDER — OXYCODONE AND ACETAMINOPHEN 7.5; 325 MG/1; MG/1
1 TABLET ORAL EVERY 6 HOURS PRN
Qty: 112 TABLET | Refills: 0 | Status: SHIPPED | OUTPATIENT
Start: 2024-06-26

## 2024-07-03 ENCOUNTER — TELEPHONE (OUTPATIENT)
Dept: RHEUMATOLOGY | Facility: CLINIC | Age: 44
End: 2024-07-03
Payer: COMMERCIAL

## 2024-07-03 NOTE — TELEPHONE ENCOUNTER
PT CALLING ASKING FOR NOTE FOR WORK FROM 5/24/24-7/6/24 THAT WHEN SHE TOOK TIME OFF WORK IT WAS DUE TO MEDICAL CONDITION. PT STATES SHE HAS INTERMITTENT FMLA BUT NEEDS THIS NOTE TO EXCUSE ALL HER ABSENCES. DISCUSSED WITH DR ROJAS & PT, PT INSISTS IT NEEDS TO COME FOR DR ROJAS SINCE SHE DID FMLA PAPERS

## 2024-07-05 ENCOUNTER — APPOINTMENT (OUTPATIENT)
Dept: INFUSION THERAPY | Facility: CLINIC | Age: 44
End: 2024-07-05
Payer: COMMERCIAL

## 2024-07-05 ENCOUNTER — PATIENT MESSAGE (OUTPATIENT)
Dept: RHEUMATOLOGY | Facility: CLINIC | Age: 44
End: 2024-07-05

## 2024-07-05 VITALS
RESPIRATION RATE: 18 BRPM | TEMPERATURE: 97.8 F | DIASTOLIC BLOOD PRESSURE: 70 MMHG | OXYGEN SATURATION: 100 % | HEART RATE: 58 BPM | SYSTOLIC BLOOD PRESSURE: 110 MMHG

## 2024-07-05 DIAGNOSIS — M06.09 POLYARTHRITIS WITH NEGATIVE RHEUMATOID FACTOR (MULTI): ICD-10-CM

## 2024-07-05 PROCEDURE — 96375 TX/PRO/DX INJ NEW DRUG ADDON: CPT | Performed by: NURSE PRACTITIONER

## 2024-07-05 PROCEDURE — 96365 THER/PROPH/DIAG IV INF INIT: CPT | Performed by: NURSE PRACTITIONER

## 2024-07-05 RX ORDER — DIPHENHYDRAMINE HYDROCHLORIDE 50 MG/ML
50 INJECTION INTRAMUSCULAR; INTRAVENOUS AS NEEDED
OUTPATIENT
Start: 2024-08-30

## 2024-07-05 RX ORDER — ALBUTEROL SULFATE 0.83 MG/ML
3 SOLUTION RESPIRATORY (INHALATION) AS NEEDED
OUTPATIENT
Start: 2024-08-30

## 2024-07-05 RX ORDER — FAMOTIDINE 10 MG/ML
20 INJECTION INTRAVENOUS ONCE AS NEEDED
OUTPATIENT
Start: 2024-08-30

## 2024-07-05 RX ORDER — EPINEPHRINE 0.3 MG/.3ML
0.3 INJECTION SUBCUTANEOUS EVERY 5 MIN PRN
OUTPATIENT
Start: 2024-08-30

## 2024-07-05 NOTE — PROGRESS NOTES
[facility]   Infusion Clinic Note   Date: 2024   Name: Susan Ford  : 1980   MRN: [id number]         Reason for Visit: No chief complaint on file.         Today: We administered golimumab (Simponi Aria) 150 mg in sodium chloride 0.9% 100 mL IV and methylPREDNISolone sod succinate.       Visit Type: INFUSION       Ordered By: Mckayla      Accompanied by:Self      Diagnosis: Polyarthritis with negative rheumatoid factor (Multi)       Allergies:   Allergies as of 2024 - Reviewed 2024   Allergen Reaction Noted    Penicillin Shortness of breath 2023    Penicillins Shortness of breath, Anaphylaxis, and Swelling 10/29/2008    Bee pollen Unknown 01/10/2024    House dust Unknown 01/10/2024         Current Medications has a current medication list which includes the following prescription(s): albuterol, albuterol, benzonatate, budesonide, cyclobenzaprine, dupixent pen, epipen 2-mohamud, fexofenadine, fluticasone, trelegy ellipta, folic acid, lidocaine, meclizine, dulera, naloxone, nebulizer accessories, oxycodone-acetaminophen, prednisone, sulfasalazine, tramadol, and valacyclovir.       Vitals:   There were no vitals filed for this visit.          Infusion Pre-procedure Checklist:   - Allergies reviewed: yes   - Medications reviewed: yes       - Previous reaction to current treatment: no      Assess patient for the concerns below. Document provider notification as appropriate.  - Active or recent infection with/without current antibiotic use: no  - Recent or planned invasive dental work: no  - Recent or planned surgeries: no  - Recently received or plans to receive vaccinations: no  - Has treatment related toxicities: no  - Is pregnant:  no      Pain: 5   - Is the pain different from normal: no   - Is the pain tolerable: yes   - Is your Doctor aware:  no      Labs: N/A         Fall Risk Screening:         Review Of Systems:  Review of Systems   All other systems  reviewed and are negative.        Infusion Readiness:   - Assessment Concerns Related to Infusion: No  - Provider notified: no      Document Below Only If Indicated:   New Patient Education:    N/A (returning patient for continuation of therapy. Ongoing education provided as needed.)        Treatment Conditions & Drug Specific Questions:    Golimumab  (SIMPONI)    (Unless otherwise specified on patient specific therapy plan):     TREATMENT CONDITIONS:  Unless otherwise specified on patient specific thearpy plan HOLD and notify provider prior to proceeding with today's infusion if patient with:  o Positive T-Spot  o Positive Hepatitis B Surface Ag    Lab Results   Component Value Date    TBGRES Negative  Reference range: NEGATIVE   10/14/2020    TSPOTR  09/12/2023     Negative  Reference range: Normal Value: Negative    A negative test result does not exclude the possibility of exposure  to   or infection with Mycobacterium tuberculosis (M. tuberculosis).    Patients with recent exposure to TB infected individuals exhibiting a   negative T-SPOT.TB result should be considered for retesting within 6   weeks or if other relevant clinical symptoms indicate.  Results from   T-SPOT.TB testing must be used in conjunction with each individual's   epidemiological history, current medical status, and results of other   diagnostic evaluations.  The T-SPOT.TB test is qualitative and  results   are reported as positive, borderline or negative, given that the test   controls perform as expected. In line with the Centers for Disease   Control and Prevention's 2010 recommendation to report quantitative   measurements alongside the qualitative result, the laboratory  provides   spot counts for informational purposes only.  The T-SPOT.TB test  should   not be interpreted as a quantitative test.  Test performed at:  [address] [address]        Lab Results   Component Value Date    HEPBSAG NEGATIVE 10/14/2020     "  No results found for: \"NONUHFIRE\", \"NONUHSWGH\", \"NONUHFISH\", \"EXTHEPBSAG\"  Lab Results   Component Value Date    HEPCAB  10/14/2020     Negative  Reference range: NEGATIVE  Performed at the [facility] unless   otherwise noted.        Lab Results   Component Value Date    HEPCAB  10/14/2020     Negative  Reference range: NEGATIVE  Performed at the [facility] unless   otherwise noted.       No results found for: \"HBCTI\", \"HEPBCAB\"    Lab Results   Component Value Date    WBC 5.8 05/30/2024    HGB 12.2 05/30/2024    HCT 39.0 05/30/2024    MCV 87 05/30/2024     05/30/2024        Labs reviewed and patient meets treatment conditions? Yes    DRUG SPECIFIC QUESTIONS:   - Up to date on all immunizations? Yes    Immunization History   Administered Date(s) Administered    Flu vaccine (IIV4), preservative free *Check age/dose* 09/17/2020    Influenza, injectable, quadrivalent 10/14/2020    PPD Test 09/20/2019    Pneumococcal conjugate vaccine, 20-valent (PREVNAR 20) 06/21/2023    Td (adult) 02/26/2011    Tdap vaccine, age 7 year and older (BOOSTRIX, ADACEL) 09/17/2020, 11/10/2020         - Any history of or new or worsening s/s of heart failure? No  (If worsening s/s notify provider prior to proceeding. Simponi may cause   exacerbation of heart failure)     - Any history of cancer, especially lymphomas? No    (Box Warning: Malignancy)      REMINDER:  WEIGHT BASED DRUG    Recommended Vitals/Observation:  Vitals: Take vital signs prior to starting infusion, at infusion conclusion and as needed.   Observation: No observation.        Weight Based Drug Calculations:    WEIGHT BASED DRUGS: NOT APPLICABLE / FLAT DOSE          Initiated By: Gemma Teixeira RN   Tolerated well, no complaints.      "

## 2024-07-05 NOTE — PATIENT INSTRUCTIONS
Today :We administered golimumab (Simponi Aria) 150 mg in sodium chloride 0.9% 100 mL IV and methylPREDNISolone sod succinate.     For:   1. Polyarthritis with negative rheumatoid factor (Multi)         Your next appointment is due in:  8/30/24        Please read the  Medication Guide that was given to you and reviewed during todays visit.     (Tell all doctors including dentists that you are taking this medication)     Go to the emergency room or call 911 if:  -You have signs of allergic reaction:   -Rash, hives, itching.   -Swollen, blistered, peeling skin.   -Swelling of face, lips, mouth, tongue or throat.   -Tightness of chest, trouble breathing, swallowing or talking     Call your doctor:  - If IV / injection site gets red, warm, swollen, itchy or leaks fluid or pus.     (Leave dressing on your IV site for at least 2 hours and keep area clean and dry  - If you get sick or have symptoms of infection or are not feeling well for any reason.    (Wash your hands often, stay away from people who are sick)  - If you have side effects from your medication that do not go away or are bothersome.     (Refer to the teaching your nurse gave you for side effects to call your doctor about)    - Common side effects may include:  stuffy nose, headache, feeling tired, muscle aches, upset stomach  - Before receiving any vaccines     - Call the [facility] at   If:  - You get sick, are on antibiotics, have had a recent vaccine, have surgery or dental work and your doctor wants your visit rescheduled.  - You need to cancel and reschedule your visit for any reason. Call at least 2 days before your visit if you need to cancel.   - Your insurance changes before your next visit.    (We will need to get approval from your new insurance. This can take up to two weeks.)     The [facility] is opened Monday thru Friday. We are closed on weekends and holidays.   Voice mail will take your call if the center  is closed. If you leave a message please allow 24 hours for a call back during weekdays. If you leave a message on a weekend/holiday, we will call you back the next business day.

## 2024-07-24 ENCOUNTER — PATIENT MESSAGE (OUTPATIENT)
Dept: RHEUMATOLOGY | Facility: CLINIC | Age: 44
End: 2024-07-24
Payer: COMMERCIAL

## 2024-07-24 DIAGNOSIS — G89.29 OTHER CHRONIC PAIN: ICD-10-CM

## 2024-07-24 DIAGNOSIS — M06.09 POLYARTHRITIS WITH NEGATIVE RHEUMATOID FACTOR (MULTI): ICD-10-CM

## 2024-07-25 ENCOUNTER — PATIENT OUTREACH (OUTPATIENT)
Dept: PRIMARY CARE | Facility: CLINIC | Age: 44
End: 2024-07-25
Payer: COMMERCIAL

## 2024-07-25 DIAGNOSIS — M06.09 RHEUMATOID ARTHRITIS, SERONEGATIVE, MULTIPLE SITES (MULTI): ICD-10-CM

## 2024-07-25 DIAGNOSIS — M19.90 CHRONIC ARTHRITIS: ICD-10-CM

## 2024-07-25 DIAGNOSIS — M06.09 POLYARTHRITIS WITH NEGATIVE RHEUMATOID FACTOR (MULTI): ICD-10-CM

## 2024-07-25 DIAGNOSIS — G89.29 OTHER CHRONIC PAIN: ICD-10-CM

## 2024-07-25 RX ORDER — OXYCODONE AND ACETAMINOPHEN 7.5; 325 MG/1; MG/1
1 TABLET ORAL EVERY 6 HOURS PRN
Qty: 112 TABLET | Refills: 0 | Status: CANCELLED | OUTPATIENT
Start: 2024-07-25

## 2024-07-25 RX ORDER — OXYCODONE AND ACETAMINOPHEN 7.5; 325 MG/1; MG/1
1 TABLET ORAL EVERY 6 HOURS PRN
Qty: 112 TABLET | Refills: 0 | Status: SHIPPED | OUTPATIENT
Start: 2024-07-25

## 2024-07-26 NOTE — PROGRESS NOTES
Call to patient who reports improved breathing status with addition of Trelegy with Dupixent.  Occasionally has missed dose and can always tell difference.  Rosy infusion received this month and plans to discuss with Dr. Garcia at next visit about increasing the dose as its effect wears off before next dose.  She had many issues with communication and scheduling gaps between rheumatology office and infusion center and hopefully will not occur again.  She never received work accommodations or FMLA letter despite Dr. Garcia approving this.  Her employer has allowed her to modify hours which seems to be helping with her fatigue and coughing. With reduced hours she can now schedule PT and was provided with Centralized Scheduling number.  My direct number provided as well and updated in Epic.

## 2024-08-19 DIAGNOSIS — M06.09 POLYARTHRITIS WITH NEGATIVE RHEUMATOID FACTOR (MULTI): ICD-10-CM

## 2024-08-19 DIAGNOSIS — G89.29 OTHER CHRONIC PAIN: ICD-10-CM

## 2024-08-19 NOTE — TELEPHONE ENCOUNTER
"PT CALLED TO REQUEST REFILL OF OXYCODONE 7.5 MG TO [facility] IN [facility].    ALSO ASKING TO \"MAKE SURE\" THERE ARE OREDRS FOR MEDROL & TORADOL FOR INFUSION SINCE INFUSION IS COMING UP. PT STATES THIS WEATHER IS NOT DOING GOOD FOR HER.  "

## 2024-08-21 RX ORDER — OXYCODONE AND ACETAMINOPHEN 7.5; 325 MG/1; MG/1
1 TABLET ORAL EVERY 6 HOURS PRN
Qty: 112 TABLET | Refills: 0 | Status: SHIPPED | OUTPATIENT
Start: 2024-08-21

## 2024-08-22 ENCOUNTER — TELEPHONE (OUTPATIENT)
Dept: PRIMARY CARE | Facility: CLINIC | Age: 44
End: 2024-08-22

## 2024-08-22 ENCOUNTER — PATIENT OUTREACH (OUTPATIENT)
Dept: PRIMARY CARE | Facility: CLINIC | Age: 44
End: 2024-08-22
Payer: COMMERCIAL

## 2024-08-22 DIAGNOSIS — M19.90 INFLAMMATORY ARTHROPATHY: ICD-10-CM

## 2024-08-22 DIAGNOSIS — M06.09 RHEUMATOID ARTHRITIS OF MULTIPLE SITES WITHOUT RHEUMATOID FACTOR (MULTI): ICD-10-CM

## 2024-08-22 NOTE — PROGRESS NOTES
Follow up call to patient who is feeling very winded today.  Once daughter home from work she will receive her Dupixent shot.  Due for Symponi next week and feeling more pain with colder weather.  She worries about how she will feel when fall/winter arrives but will discuss dose adjustment with Dr. Garcia at next visit October 16.  She never heard back from Scheduling Dept after leaving message last month for therapy appt and would like help getting set up.  I called and was told that scheduling never sends them the messages and moving forward they suggest that their direct number should be given for all referrals .  Since order is more than 30 days old, it is no longer valid.  I will reach out to PCP but also concerned insurance may require another visit with supporting documentation why therapy needed.  Patient does plan on scheduling follow up as she's due anyways.  Once PT completed she will be able to process order for scooter through her insurance.

## 2024-08-22 NOTE — PROGRESS NOTES
Susan asked for help getting PT appt (referral made 6/13 for polyarthritis) as Scheduling Dept never called her back.  I called [facility] directly and they said they have problems receiving messages from CS and best to just give patient their direct number call.  Unfortunately, her order is more than 30 days old and no longer valid.  Would you please reorder?  I'm not sure if insurance will require an appt to support need for therapy but patient is planning on calling to schedule routine follow up in next day or so.  Please reach out if any questions. Thank you!  
(4) walks frequently

## 2024-08-24 NOTE — TELEPHONE ENCOUNTER
Referral for PT entered most recently by her rheumatologist (Dr. Block) on 2024 - per order details it does not  until 2025.  Please notify patient and provide scheduling number.

## 2024-08-26 ENCOUNTER — TELEPHONE (OUTPATIENT)
Dept: RHEUMATOLOGY | Facility: CLINIC | Age: 44
End: 2024-08-26
Payer: COMMERCIAL

## 2024-08-26 NOTE — PROGRESS NOTES
Spoke with [facility] and they tell me that any messages with Centralized Scheduling Dept never get forwarded to them.  They prefer that patients call them directly for appointments.  Phone number [phone] provided to patient but advised they need new order from dr. Garcia as original one is greater than 30 days.  She verbalized understanding

## 2024-08-30 ENCOUNTER — APPOINTMENT (OUTPATIENT)
Dept: INFUSION THERAPY | Facility: CLINIC | Age: 44
End: 2024-08-30
Payer: COMMERCIAL

## 2024-08-30 VITALS
DIASTOLIC BLOOD PRESSURE: 49 MMHG | HEART RATE: 63 BPM | OXYGEN SATURATION: 98 % | RESPIRATION RATE: 16 BRPM | BODY MASS INDEX: 27.24 KG/M2 | WEIGHT: 166.2 LBS | SYSTOLIC BLOOD PRESSURE: 102 MMHG | TEMPERATURE: 98.1 F

## 2024-08-30 DIAGNOSIS — M06.09 POLYARTHRITIS WITH NEGATIVE RHEUMATOID FACTOR (MULTI): ICD-10-CM

## 2024-08-30 LAB — PREGNANCY TEST URINE, POC: NEGATIVE

## 2024-08-30 RX ORDER — DIPHENHYDRAMINE HYDROCHLORIDE 50 MG/ML
50 INJECTION INTRAMUSCULAR; INTRAVENOUS AS NEEDED
OUTPATIENT
Start: 2024-10-25

## 2024-08-30 RX ORDER — EPINEPHRINE 0.3 MG/.3ML
0.3 INJECTION SUBCUTANEOUS EVERY 5 MIN PRN
OUTPATIENT
Start: 2024-10-25

## 2024-08-30 RX ORDER — KETOROLAC TROMETHAMINE 15 MG/ML
30 INJECTION, SOLUTION INTRAMUSCULAR; INTRAVENOUS ONCE AS NEEDED
Status: COMPLETED | OUTPATIENT
Start: 2024-08-30 | End: 2024-08-30

## 2024-08-30 RX ORDER — ALBUTEROL SULFATE 0.83 MG/ML
3 SOLUTION RESPIRATORY (INHALATION) AS NEEDED
OUTPATIENT
Start: 2024-10-25

## 2024-08-30 RX ORDER — FAMOTIDINE 10 MG/ML
20 INJECTION INTRAVENOUS ONCE AS NEEDED
OUTPATIENT
Start: 2024-10-25

## 2024-08-30 ASSESSMENT — ENCOUNTER SYMPTOMS
NAUSEA: 0
MYALGIAS: 1
WOUND: 0
SHORTNESS OF BREATH: 1
ADENOPATHY: 0
LEG SWELLING: 0
WHEEZING: 1
CHEST TIGHTNESS: 0
FEVER: 0
VOMITING: 0
CHILLS: 0
HOT FLASHES: 1
PALPITATIONS: 0
ARTHRALGIAS: 1
FATIGUE: 1

## 2024-08-30 ASSESSMENT — PAIN SCALES - GENERAL: PAINLEVEL: 9

## 2024-08-30 NOTE — PROGRESS NOTES
[facility]   Infusion Clinic Note   Date: 2024   Name: Susan Ford  : 1980   MRN: [id number]          Reason for Visit: OP Infusion (Simponi)         Today: We administered methylPREDNISolone sod succinate, golimumab (Simponi Aria) 150 mg in sodium chloride 0.9% 100 mL IV, and ketorolac.       Visit Type: INFUSION       Ordered By: Dr Garcia       Accompanied by:       Diagnosis: Polyarthritis with negative rheumatoid factor (Multi)        Allergies:   Allergies as of 2024 - Reviewed 2024   Allergen Reaction Noted    Penicillin Shortness of breath 2023    Penicillins Shortness of breath, Anaphylaxis, and Swelling 10/29/2008    Bee pollen Unknown 01/10/2024    House dust Unknown 01/10/2024          Current Medications has a current medication list which includes the following prescription(s): albuterol, albuterol, benzonatate, budesonide, cyclobenzaprine, dupixent pen, epipen 2-mohamud, fexofenadine, fluticasone, trelegy ellipta, folic acid, lidocaine, meclizine, dulera, naloxone, nebulizer accessories, oxycodone-acetaminophen, prednisone, sulfasalazine, tramadol, and valacyclovir.       Vitals:   Vitals:    24 1156 24 1320   BP: 129/81 (!) 102/49   Pulse: 64 63   Resp: 18 16   Temp: 36.7 °C (98.1 °F) 36.7 °C (98.1 °F)   SpO2: 98%    Weight: 75.4 kg (166 lb 3.2 oz)    PainSc:   9    LMP: 2024             Infusion Pre-procedure Checklist:   - Allergies reviewed: yes   - Medications reviewed: yes       - Previous reaction to current treatment: no      Assess patient for the concerns below. Document provider notification as appropriate.  - Active or recent infection with/without current antibiotic use: no  - Recent or planned invasive dental work: no  - Recent or planned surgeries: no  - Recently received or plans to receive vaccinations: no  - Has treatment related toxicities: n/a  - Is pregnant:  no      Pain: 4   - Is the pain  different from normal: yes   - Is your Doctor aware:  yes       Labs:  POCT urine pregnancy negative          Fall Risk Screening: Bar Fall Risk  History of Falling, Immediate or Within 3 Months: Yes  Secondary Diagnosis: No  Ambulatory Aid: Walks without aid/bedrest/nurse assist  Intravenous Therapy/Heparin Lock: No  Gait/Transferring: Normal/bedrest/immobile  Mental Status: Oriented to own ability  Bar Fall Risk Score: 25       Review Of Systems:  Review of Systems   Constitutional:  Positive for fatigue. Negative for chills and fever.   Respiratory:  Positive for shortness of breath and wheezing (Used albuterol inhaler on arrival with improvement). Negative for chest tightness.    Cardiovascular:  Negative for leg swelling and palpitations.   Gastrointestinal:  Negative for nausea and vomiting.   Endocrine: Positive for hot flashes.   Musculoskeletal:  Positive for arthralgias and myalgias.        Recent fall down stairs, states her knee gave out. No severe injuries   Skin:  Negative for itching, rash and wound.   Hematological:  Negative for adenopathy.         ROS completed? Yes      Infusion Readiness:  - Assessment Concerns Related to Infusion: No  - Provider notified: n/a      Document Below Only If Indicated:   New Patient Education:    N/A (returning patient for continuation of therapy. Ongoing education provided as needed.)        Treatment Conditions & Drug Specific Questions:    Golimumab  (SIMPONI)    (Unless otherwise specified on patient specific therapy plan):     TREATMENT CONDITIONS:  Unless otherwise specified on patient specific thearpy plan HOLD and notify provider prior to proceeding with today's infusion if patient with:  o Positive T-Spot  o Positive Hepatitis B Surface Ag / Hepatitis C     Lab Results   Component Value Date    TBGRES Negative  Reference range: NEGATIVE   10/14/2020    TSPOTR  09/12/2023     Negative  Reference range: Normal Value: Negative    A negative test result does  "not exclude the possibility of exposure  to   or infection with Mycobacterium tuberculosis (M. tuberculosis).    Patients with recent exposure to TB infected individuals exhibiting a   negative T-SPOT.TB result should be considered for retesting within 6   weeks or if other relevant clinical symptoms indicate.  Results from   T-SPOT.TB testing must be used in conjunction with each individual's   epidemiological history, current medical status, and results of other   diagnostic evaluations.  The T-SPOT.TB test is qualitative and  results   are reported as positive, borderline or negative, given that the test   controls perform as expected. In line with the Centers for Disease   Control and Prevention's 2010 recommendation to report quantitative   measurements alongside the qualitative result, the laboratory  provides   spot counts for informational purposes only.  The T-SPOT.TB test  should   not be interpreted as a quantitative test.  Test performed at:  [address] [address]        Lab Results   Component Value Date    HEPBSAG NEGATIVE 10/14/2020      No results found for: \"NONUHFIRE\", \"NONUHSWGH\", \"NONUHFISH\", \"EXTHEPBSAG\"  Lab Results   Component Value Date    HEPCAB  10/14/2020     Negative  Reference range: NEGATIVE  Performed at the [facility] unless   otherwise noted.        Lab Results   Component Value Date    HEPCAB  10/14/2020     Negative  Reference range: NEGATIVE  Performed at the [facility] unless   otherwise noted.       No results found for: \"HBCTI\", \"HEPBCAB\"    Lab Results   Component Value Date    WBC 5.8 05/30/2024    HGB 12.2 05/30/2024    HCT 39.0 05/30/2024    MCV 87 05/30/2024     05/30/2024        Labs reviewed and patient meets treatment conditions? Yes    DRUG SPECIFIC QUESTIONS:   - Up to date on all immunizations per patient report? Yes    Available Immunization Records:  Immunization History "   Administered Date(s) Administered    Flu vaccine (IIV4), preservative free *Check age/dose* 09/17/2020    Influenza, injectable, quadrivalent 10/14/2020    PPD Test 09/20/2019    Pneumococcal conjugate vaccine, 20-valent (PREVNAR 20) 06/21/2023    Td (adult) 02/26/2011    Tdap vaccine, age 7 year and older (BOOSTRIX, ADACEL) 09/17/2020, 11/10/2020         - Any history of or new or worsening s/s of heart failure which may include dyspnea, edema? No  (If worsening s/s notify provider prior to proceeding. Simponi may cause exacerbation of heart failure)     - Any new diagnosis of cancer, especially lymphomas? No    (Box Warning: Malignancy. If YES notify prescribing provider prior to proceeding )      REMINDER:  WEIGHT BASED DRUG    Recommended Vitals/Observation:  Vitals: Take vital signs prior to starting infusion, at infusion conclusion and as needed.   Observation: No observation.        Weight Based Drug Calculations:    WEIGHT BASED DRUGS: Golimumab (SIMPONI)   Patient's dosing weight (kg): 74.8     10% weight variance for prescribed treatment: 67.4 kg to 82.2 kg     Patient's weight today:   Vitals:    08/30/24 1156   Weight: 75.4 kg (166 lb 3.2 oz)         weight range for prescribed dose:     Patient weight today falls outside of 10% variance or  weight range: No     Home Care pharmacist informed of weight variance: Not applicable    Doses that are weight based have an acceptable variance rule within 10% of the prescribed   order and/or within  weight range. If patient weight on day of infusion falls   outside of the 10% variance, or weight range, infusion is administered and   pharmacy contacted regarding future dosing adjustments, per policy.         Initiated By: Jaelyn Aggarwal, RN   1320- IV line flushing with 30ml Normal Saline. Patient tolerated infusion well. No signs or symptoms of reaction noted.

## 2024-08-30 NOTE — PATIENT INSTRUCTIONS
Today :We administered methylPREDNISolone sod succinate, golimumab (Simponi Aria) 150 mg in sodium chloride 0.9% 100 mL IV, and ketorolac.     For:   1. Polyarthritis with negative rheumatoid factor (Multi)         Your next appointment is due in:  2 months        Please read the  Medication Guide that was given to you and reviewed during todays visit.     (Tell all doctors including dentists that you are taking this medication)     Go to the emergency room or call 911 if:  -You have signs of allergic reaction:   -Rash, hives, itching.   -Swollen, blistered, peeling skin.   -Swelling of face, lips, mouth, tongue or throat.   -Tightness of chest, trouble breathing, swallowing or talking     Call your doctor:  - If IV / injection site gets red, warm, swollen, itchy or leaks fluid or pus.     (Leave dressing on your IV site for at least 2 hours and keep area clean and dry  - If you get sick or have symptoms of infection or are not feeling well for any reason.    (Wash your hands often, stay away from people who are sick)  - If you have side effects from your medication that do not go away or are bothersome.     (Refer to the teaching your nurse gave you for side effects to call your doctor about)    - Common side effects may include:  stuffy nose, headache, feeling tired, muscle aches, upset stomach  - Before receiving any vaccines     - Call the [facility] at   If:  - You get sick, are on antibiotics, have had a recent vaccine, have surgery or dental work and your doctor wants your visit rescheduled.  - You need to cancel and reschedule your visit for any reason. Call at least 2 days before your visit if you need to cancel.   - Your insurance changes before your next visit.    (We will need to get approval from your new insurance. This can take up to two weeks.)     The [facility] is opened Monday thru Friday. We are closed on weekends and holidays.   Voice mail will take your call if  the center is closed. If you leave a message please allow 24 hours for a call back during weekdays. If you leave a message on a weekend/holiday, we will call you back the next business day.

## 2024-09-13 ENCOUNTER — PATIENT OUTREACH (OUTPATIENT)
Dept: PRIMARY CARE | Facility: CLINIC | Age: 44
End: 2024-09-13
Payer: COMMERCIAL

## 2024-09-13 DIAGNOSIS — M19.90 INFLAMMATORY ARTHRITIS: ICD-10-CM

## 2024-09-13 DIAGNOSIS — M06.09 RHEUMATOID ARTHRITIS OF MULTIPLE SITES WITH NEGATIVE RHEUMATOID FACTOR (MULTI): ICD-10-CM

## 2024-09-13 NOTE — PROGRESS NOTES
Telephone call with patient this date.  She reports doing well but continues to feel Symponi wearing off by 30 days.  She has appointment with Rheumatology next month and will discuss increasing frequency with her along with few other questions she has in mind.  Patient called to schedule AWV with PCP but none available before physicians planned maternity leave although she was placed on cancellation list.  She has appointment to start PT on 10/11/2024.  Ergonomic modifications made for her job and feels she is getting stronger with her reasonable accommodations.  No other questions voiced at this time.

## 2024-09-18 DIAGNOSIS — G89.29 OTHER CHRONIC PAIN: ICD-10-CM

## 2024-09-18 DIAGNOSIS — M06.09 POLYARTHRITIS WITH NEGATIVE RHEUMATOID FACTOR (MULTI): ICD-10-CM

## 2024-09-18 RX ORDER — OXYCODONE AND ACETAMINOPHEN 7.5; 325 MG/1; MG/1
1 TABLET ORAL EVERY 6 HOURS PRN
Qty: 112 TABLET | Refills: 0 | Status: SHIPPED | OUTPATIENT
Start: 2024-09-18

## 2024-10-10 ENCOUNTER — LAB (OUTPATIENT)
Dept: LAB | Facility: LAB | Age: 44
End: 2024-10-10
Payer: COMMERCIAL

## 2024-10-10 DIAGNOSIS — E55.9 VITAMIN D DEFICIENCY: ICD-10-CM

## 2024-10-10 DIAGNOSIS — Z98.890 HISTORY OF VENTRAL HERNIA REPAIR: ICD-10-CM

## 2024-10-10 DIAGNOSIS — R10.33 PERIUMBILICAL ABDOMINAL PAIN: ICD-10-CM

## 2024-10-10 DIAGNOSIS — Z87.19 HISTORY OF VENTRAL HERNIA REPAIR: ICD-10-CM

## 2024-10-10 DIAGNOSIS — M06.09 POLYARTHRITIS WITH NEGATIVE RHEUMATOID FACTOR (MULTI): ICD-10-CM

## 2024-10-10 LAB
25(OH)D3 SERPL-MCNC: 33 NG/ML (ref 30–100)
ALBUMIN SERPL BCP-MCNC: 4.4 G/DL (ref 3.4–5)
ALP SERPL-CCNC: 58 U/L (ref 33–110)
ALT SERPL W P-5'-P-CCNC: 15 U/L (ref 7–45)
ANION GAP SERPL CALC-SCNC: 12 MMOL/L (ref 10–20)
APPEARANCE UR: ABNORMAL
AST SERPL W P-5'-P-CCNC: 16 U/L (ref 9–39)
BASOPHILS # BLD AUTO: 0.05 X10*3/UL (ref 0–0.1)
BASOPHILS NFR BLD AUTO: 0.8 %
BILIRUB SERPL-MCNC: 0.3 MG/DL (ref 0–1.2)
BILIRUB UR STRIP.AUTO-MCNC: NEGATIVE MG/DL
BUN SERPL-MCNC: 8 MG/DL (ref 6–23)
CALCIUM SERPL-MCNC: 10 MG/DL (ref 8.6–10.6)
CHLORIDE SERPL-SCNC: 104 MMOL/L (ref 98–107)
CK SERPL-CCNC: 101 U/L (ref 0–215)
CO2 SERPL-SCNC: 30 MMOL/L (ref 21–32)
COLOR UR: ABNORMAL
CREAT SERPL-MCNC: 0.73 MG/DL (ref 0.5–1.05)
CRP SERPL-MCNC: <0.1 MG/DL
EGFRCR SERPLBLD CKD-EPI 2021: >90 ML/MIN/1.73M*2
EOSINOPHIL # BLD AUTO: 0.22 X10*3/UL (ref 0–0.7)
EOSINOPHIL NFR BLD AUTO: 3.5 %
ERYTHROCYTE [DISTWIDTH] IN BLOOD BY AUTOMATED COUNT: 11.9 % (ref 11.5–14.5)
ERYTHROCYTE [SEDIMENTATION RATE] IN BLOOD BY WESTERGREN METHOD: 10 MM/H (ref 0–20)
GLUCOSE SERPL-MCNC: 97 MG/DL (ref 74–99)
GLUCOSE UR STRIP.AUTO-MCNC: NORMAL MG/DL
HCT VFR BLD AUTO: 41.8 % (ref 36–46)
HGB BLD-MCNC: 13 G/DL (ref 12–16)
IMM GRANULOCYTES # BLD AUTO: 0.01 X10*3/UL (ref 0–0.7)
IMM GRANULOCYTES NFR BLD AUTO: 0.2 % (ref 0–0.9)
KETONES UR STRIP.AUTO-MCNC: NEGATIVE MG/DL
LEUKOCYTE ESTERASE UR QL STRIP.AUTO: ABNORMAL
LYMPHOCYTES # BLD AUTO: 3.9 X10*3/UL (ref 1.2–4.8)
LYMPHOCYTES NFR BLD AUTO: 61.2 %
MCH RBC QN AUTO: 26.5 PG (ref 26–34)
MCHC RBC AUTO-ENTMCNC: 31.1 G/DL (ref 32–36)
MCV RBC AUTO: 85 FL (ref 80–100)
MONOCYTES # BLD AUTO: 0.42 X10*3/UL (ref 0.1–1)
MONOCYTES NFR BLD AUTO: 6.6 %
MUCOUS THREADS #/AREA URNS AUTO: NORMAL /LPF
NEUTROPHILS # BLD AUTO: 1.77 X10*3/UL (ref 1.2–7.7)
NEUTROPHILS NFR BLD AUTO: 27.7 %
NITRITE UR QL STRIP.AUTO: NEGATIVE
NRBC BLD-RTO: 0 /100 WBCS (ref 0–0)
PH UR STRIP.AUTO: 5 [PH]
PLATELET # BLD AUTO: 335 X10*3/UL (ref 150–450)
POTASSIUM SERPL-SCNC: 3.9 MMOL/L (ref 3.5–5.3)
PROT SERPL-MCNC: 7.6 G/DL (ref 6.4–8.2)
PROT UR STRIP.AUTO-MCNC: NEGATIVE MG/DL
RBC # BLD AUTO: 4.91 X10*6/UL (ref 4–5.2)
RBC # UR STRIP.AUTO: ABNORMAL /UL
RBC #/AREA URNS AUTO: NORMAL /HPF
SODIUM SERPL-SCNC: 142 MMOL/L (ref 136–145)
SP GR UR STRIP.AUTO: 1.02
SQUAMOUS #/AREA URNS AUTO: NORMAL /HPF
UROBILINOGEN UR STRIP.AUTO-MCNC: NORMAL MG/DL
WBC # BLD AUTO: 6.4 X10*3/UL (ref 4.4–11.3)
WBC #/AREA URNS AUTO: NORMAL /HPF

## 2024-10-10 PROCEDURE — 82164 ANGIOTENSIN I ENZYME TEST: CPT

## 2024-10-10 PROCEDURE — 85025 COMPLETE CBC W/AUTO DIFF WBC: CPT

## 2024-10-10 PROCEDURE — 82550 ASSAY OF CK (CPK): CPT

## 2024-10-10 PROCEDURE — 82652 VIT D 1 25-DIHYDROXY: CPT

## 2024-10-10 PROCEDURE — 86140 C-REACTIVE PROTEIN: CPT

## 2024-10-10 PROCEDURE — 85652 RBC SED RATE AUTOMATED: CPT

## 2024-10-10 PROCEDURE — 36415 COLL VENOUS BLD VENIPUNCTURE: CPT

## 2024-10-10 PROCEDURE — 80053 COMPREHEN METABOLIC PANEL: CPT

## 2024-10-10 PROCEDURE — 81001 URINALYSIS AUTO W/SCOPE: CPT

## 2024-10-10 PROCEDURE — 82306 VITAMIN D 25 HYDROXY: CPT

## 2024-10-10 PROCEDURE — 87086 URINE CULTURE/COLONY COUNT: CPT

## 2024-10-10 NOTE — PROGRESS NOTES
Physical Therapy    Physical Therapy Evaluation and Treatment      Patient Name: Susan Ford  MRN: [id number]  Today's Date: 10/11/2024    Time Entry:   Time Calculation  Start Time: 1235  Stop Time: 1310  Time Calculation (min): 35 min  PT Evaluation Time Entry  PT Evaluation (Moderate) Time Entry: 35                      Assessment:    Susan Ford presents with multiple co-morbidities and clinical impairments which affect her ability to ambulate safely in community situations. She a history of multiple falls over the last 5 years. She presents as a good candidate for a motorized scooter to improve her safe functional mobility and help minimize pain by avoiding over exertion.  Plan:  OP PT Plan  PT Plan: Initial Assessment and Treatment only, No Additional PT interventions required at this time  Onset Date: 01/01/20  Certification Period Start Date: 10/11/24  Certification Period End Date: 01/09/25  Rehab Potential: Poor  Plan of Care Agreement: Patient    Current Problem:   1. Chronic pain syndrome        2. Abnormal gait        3. Frequent falls        4. Polyarthritis with negative rheumatoid factor (Multi)  Referral to Physical Therapy      5. Other chronic pain  Referral to Physical Therapy          Subjective    Susan Ford chronic total body pain 10 yrs which has gotten after a  L knee reconstruction surgery. C/o weakness H/o falls. Susan Ford is here for evaluation for motorized scooter.     Pain:   Always severe 10/10 joint and muscle pain total body, warm showers and lidocane helps temporarily,  Home Living:   Lives with  . 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    Objective   Cognition:   A+Ox3      Outcome Measures:  Palpation: all major muscle bellies tender to touch, visible L vs R VMO atrophy  UE strength: shoulder flexion  R  3+/5 L  3+/5, elbow flexion R 3+/5  L 3+/5, elbow extension R  3+/5  L  3+/5 all resistive tests painful  LUE strength hip flex  R L, Knee extend  R 3+/5  L 3+/5 L patella catches difficulty coming out of extension, Ankle DF   R 3+/5 L3+/5 All MMT are painful  AROM: WFL UE and LE  Single leg stance: unable R and L   Gait: with std cane  Antalgic L LE,  SOB at 50 ft  Stand tolerance: 1 min 20 sec increased bilat LE pain , needs to lean into rail to support self   strength Praveen lv 2 R 75 lbs  L 55 lbs R hand cramping after max effort          EDUCATION:     extensive education regarding mechanism of injury, relevant functional anatomy, treatment program rational, self management, HEP, and POC   Discussed that my report will go to physician to help support need for motorized scooter  Goals:  evaluation only

## 2024-10-11 ENCOUNTER — EVALUATION (OUTPATIENT)
Dept: PHYSICAL THERAPY | Facility: CLINIC | Age: 44
End: 2024-10-11
Payer: COMMERCIAL

## 2024-10-11 DIAGNOSIS — G89.29 OTHER CHRONIC PAIN: ICD-10-CM

## 2024-10-11 DIAGNOSIS — R29.6 FREQUENT FALLS: ICD-10-CM

## 2024-10-11 DIAGNOSIS — M06.09 POLYARTHRITIS WITH NEGATIVE RHEUMATOID FACTOR (MULTI): ICD-10-CM

## 2024-10-11 DIAGNOSIS — R26.9 ABNORMAL GAIT: ICD-10-CM

## 2024-10-11 DIAGNOSIS — G89.4 CHRONIC PAIN SYNDROME: Primary | ICD-10-CM

## 2024-10-11 LAB
HOLD SPECIMEN: NORMAL
HOLD SPECIMEN: NORMAL

## 2024-10-11 PROCEDURE — 97162 PT EVAL MOD COMPLEX 30 MIN: CPT | Mod: GP | Performed by: PHYSICAL THERAPIST

## 2024-10-11 ASSESSMENT — ENCOUNTER SYMPTOMS
OCCASIONAL FEELINGS OF UNSTEADINESS: 1
LOSS OF SENSATION IN FEET: 1
DEPRESSION: 1

## 2024-10-12 LAB
1,25(OH)2D SERPL-MCNC: 87.8 PG/ML (ref 19.9–79.3)
ACE SERPL-CCNC: 81 U/L (ref 16–85)
BACTERIA UR CULT: NORMAL

## 2024-10-16 ENCOUNTER — OFFICE VISIT (OUTPATIENT)
Dept: RHEUMATOLOGY | Facility: CLINIC | Age: 44
End: 2024-10-16
Payer: COMMERCIAL

## 2024-10-16 ENCOUNTER — PATIENT OUTREACH (OUTPATIENT)
Dept: PRIMARY CARE | Facility: CLINIC | Age: 44
End: 2024-10-16
Payer: COMMERCIAL

## 2024-10-16 VITALS
OXYGEN SATURATION: 99 % | BODY MASS INDEX: 27.41 KG/M2 | HEART RATE: 76 BPM | SYSTOLIC BLOOD PRESSURE: 130 MMHG | HEIGHT: 65 IN | WEIGHT: 164.5 LBS | DIASTOLIC BLOOD PRESSURE: 70 MMHG

## 2024-10-16 DIAGNOSIS — R42 VERTIGO: ICD-10-CM

## 2024-10-16 DIAGNOSIS — E55.9 VITAMIN D DEFICIENCY: ICD-10-CM

## 2024-10-16 DIAGNOSIS — M32.10 SYSTEMIC LUPUS ERYTHEMATOSUS WITH ORGAN SYSTEM INVOLVEMENT (MULTI): ICD-10-CM

## 2024-10-16 DIAGNOSIS — Z79.899 ENCOUNTER FOR LONG-TERM (CURRENT) USE OF MEDICATIONS: ICD-10-CM

## 2024-10-16 DIAGNOSIS — M06.09 POLYARTHRITIS WITH NEGATIVE RHEUMATOID FACTOR (MULTI): Primary | ICD-10-CM

## 2024-10-16 DIAGNOSIS — G89.29 OTHER CHRONIC PAIN: ICD-10-CM

## 2024-10-16 DIAGNOSIS — M19.90 OSTEOARTHRITIS, UNSPECIFIED OSTEOARTHRITIS TYPE, UNSPECIFIED SITE: ICD-10-CM

## 2024-10-16 DIAGNOSIS — M06.9 RHEUMATIC JOINT DISEASE (MULTI): ICD-10-CM

## 2024-10-16 DIAGNOSIS — Z78.9 POOR TOLERANCE FOR AMBULATION: ICD-10-CM

## 2024-10-16 PROCEDURE — 99213 OFFICE O/P EST LOW 20 MIN: CPT | Performed by: INTERNAL MEDICINE

## 2024-10-16 PROCEDURE — 3008F BODY MASS INDEX DOCD: CPT | Performed by: INTERNAL MEDICINE

## 2024-10-16 RX ORDER — POLYETHYLENE GLYCOL 3350 17 G/17G
17 POWDER, FOR SOLUTION ORAL DAILY
Qty: 30 PACKET | Refills: 11 | Status: SHIPPED | OUTPATIENT
Start: 2024-10-16 | End: 2024-10-19

## 2024-10-16 RX ORDER — TRAMADOL HYDROCHLORIDE 50 MG/1
50 TABLET ORAL 2 TIMES DAILY
Qty: 60 TABLET | Refills: 3 | Status: SHIPPED | OUTPATIENT
Start: 2024-10-16

## 2024-10-16 RX ORDER — PREDNISONE 10 MG/1
10 TABLET ORAL DAILY
Qty: 30 TABLET | Refills: 3 | Status: SHIPPED | OUTPATIENT
Start: 2024-10-16

## 2024-10-16 RX ORDER — CYCLOBENZAPRINE HCL 10 MG
10 TABLET ORAL AS NEEDED
Qty: 90 TABLET | Refills: 3 | Status: SHIPPED | OUTPATIENT
Start: 2024-10-16

## 2024-10-16 RX ORDER — OXYCODONE AND ACETAMINOPHEN 10; 325 MG/1; MG/1
1 TABLET ORAL EVERY 4 HOURS PRN
Qty: 112 TABLET | Refills: 0 | Status: SHIPPED | OUTPATIENT
Start: 2024-10-16

## 2024-10-16 RX ORDER — MECLIZINE HYDROCHLORIDE 25 MG/1
25 TABLET ORAL 3 TIMES DAILY PRN
Qty: 90 TABLET | Refills: 3 | Status: SHIPPED | OUTPATIENT
Start: 2024-10-16

## 2024-10-16 RX ORDER — SULFASALAZINE 500 MG/1
500 TABLET, DELAYED RELEASE ORAL 2 TIMES DAILY
Qty: 180 TABLET | Refills: 3 | Status: SHIPPED | OUTPATIENT
Start: 2024-10-16

## 2024-10-16 ASSESSMENT — ROUTINE ASSESSMENT OF PATIENT INDEX DATA (RAPID3)
FN_SCORE: 7.7
TURN_FAUCETS_OFF: WITH MUCH DIFFICULTY
TOTAL RAPID3 SCORE: 26.2
SEVERITY_SCORE: HIGH SEVERITY (HS)
FEELINGS_ANXIETY_NERVOUS: UNABLE TO DO
WASH_DRY_BODY: WITH MUCH DIFFICULTY
FEELINGS_DEPRESSION: UNABLE TO DO
LIFT_CUP_TO_MOUTH: WITH MUCH DIFFICULTY
ON A SCALE OF ONE TO TEN, HOW MUCH PAIN HAVE YOU HAD BECAUSE OF YOUR CONDITION OVER THE PAST WEEK?: 9
DRESS_YOURSELF: WITH MUCH DIFFICULTY
WEIGHTED_TOTAL_SCORE: 8.73
WALK_KILOMETERS: UNABLE TO DO
PARTIPATE_RECREATIONAL_ACTIVITIES: UNABLE TO DO
IN_OUT_TRANSPORT: UNABLE TO DO
SUM OF QUESTIONS A TO J: 23
ON A SCALE OF ONE TO TEN, CONSIDERING ALL THE WAYS IN WHICH ILLNESS AND HEALTH CONDITIONS MAY AFFECT YOU AT THIS TIME, PLEASE INDICATE BELOW HOW YOU ARE DOING:: 9.5
GOOD_NIGHTS_SLEEP: UNABLE TO DO
IN_OUT_BED: WITH MUCH DIFFICULTY
ON A SCALE OF ONE TO TEN, HOW MUCH PAIN HAVE YOU HAD BECAUSE OF YOUR CONDITION OVER THE PAST WEEK?: 9
SEVERITY_SCORE: 0
ON A SCALE OF ONE TO TEN, CONSIDERING ALL THE WAYS IN WHICH ILLNESS AND HEALTH CONDITIONS MAY AFFECT YOU AT THIS TIME, PLEASE INDICATE BELOW HOW YOU ARE DOING:: 9.5
PICK_CLOTHES_OFF_FLOOR: WITH MUCH DIFFICULTY
WALK_FLAT_GROUND: WITH MUCH DIFFICULTY

## 2024-10-16 ASSESSMENT — PATIENT HEALTH QUESTIONNAIRE - PHQ9
1. LITTLE INTEREST OR PLEASURE IN DOING THINGS: SEVERAL DAYS
10. IF YOU CHECKED OFF ANY PROBLEMS, HOW DIFFICULT HAVE THESE PROBLEMS MADE IT FOR YOU TO DO YOUR WORK, TAKE CARE OF THINGS AT HOME, OR GET ALONG WITH OTHER PEOPLE: SOMEWHAT DIFFICULT
2. FEELING DOWN, DEPRESSED OR HOPELESS: SEVERAL DAYS
SUM OF ALL RESPONSES TO PHQ9 QUESTIONS 1 AND 2: 2

## 2024-10-16 ASSESSMENT — ENCOUNTER SYMPTOMS
DEPRESSION: 1
LOSS OF SENSATION IN FEET: 1
OCCASIONAL FEELINGS OF UNSTEADINESS: 1

## 2024-10-16 ASSESSMENT — PAIN SCALES - GENERAL: PAINLEVEL_OUTOF10: 8

## 2024-10-16 NOTE — PROGRESS NOTES
[facility]/  Rheumatology  [facility] [facility], [address]  [place] [address]  Phone: [phone]  Fax: [phone]    Rheumatology Progress Note 6/13/24    Susan Ford is a 44 y.o. female here for follow up.    Last Visit: 2/6/24    Rheum Hx      Previous Tx    Health Maintenance  DXA- none  Malignancy Hx- none  Immunization History   Administered Date(s) Administered    Flu vaccine (IIV4), preservative free *Check age/dose* 09/17/2020    Influenza, injectable, quadrivalent 10/14/2020    PPD Test 02/24/2012, 03/02/2012, 09/20/2019    Pneumococcal conjugate vaccine, 20-valent (PREVNAR 20) 06/21/2023    Td (adult) 02/26/2011    Tdap vaccine, age 7 year and older (BOOSTRIX, ADACEL) 09/17/2020, 11/10/2020          Past Medical History:   Diagnosis Date    Asthma     Cervicalgia     Chronic pain syndrome     Frequent falls     GERD (gastroesophageal reflux disease)     Knee pain, right     Snoring     Snoring    UTI (urinary tract infection)       Past Surgical History:   Procedure Laterality Date    APPENDECTOMY  11/21/2013    Appendectomy    HERNIA REPAIR  11/21/2013    Hernia Repair    PATELLAR TENDON REPAIR Left     TUBAL LIGATION  11/21/2013    Tubal Ligation    WISDOM TOOTH EXTRACTION        Current Outpatient Medications   Medication Sig Dispense Refill    albuterol (Ventolin HFA) 90 mcg/actuation inhaler Inhale 1 puff every 4 hours if needed for wheezing (inhale 1-2 puffs every 4 hours as needed.). 18 g 2    albuterol 2.5 mg /3 mL (0.083 %) nebulizer solution Take 3 mL (2.5 mg) by nebulization every 6 hours if needed for wheezing.      benzonatate (Tessalon) 100 mg capsule Take 1 capsule (100 mg) by mouth 3 times a day as needed for cough. Do not crush or chew.      dupilumab (Dupixent Pen) 300 mg/2 mL injection Inject 2 mL (300 mg) under the skin every 14 (fourteen) days.      EPINEPHrine (EpiPen 2-Milan) 0.3 mg/0.3 mL injection syringe as directed Injection as directed for 1  days      fexofenadine (Allegra) 180 mg tablet       fluticasone (Flonase) 50 mcg/actuation nasal spray Administer 1 spray into each nostril once daily. Shake gently. Before first use, prime pump. After use, clean tip and replace cap.      fluticasone-umeclidin-vilanter (Trelegy Ellipta) 200-62.5-25 mcg blister with device Inhale 1 puff once daily.      folic acid (Folvite) 1 mg tablet Take 1 tablet (1 mg) by mouth once daily. 90 tablet 3    lidocaine (Lidoderm) 5 % patch Place 1 patch over 12 hours on the skin once daily. Remove & discard patch within 12 hours or as directed by MD. 30 patch 11    mometasone-formoterol (Dulera) 200-5 mcg/actuation inhaler Inhale 2 puffs twice a day.      naloxone (Narcan) 4 mg/0.1 mL nasal spray Administer 1 spray (4 mg) into affected nostril(s) if needed for opioid reversal. May repeat in 2-3 minutes if needed, alternating nostrils 2 each 0    nebulizer accessories misc Use as directed with nebulizer machine. 1 each 0    budesonide (Pulmicort) 0.5 mg/2 mL nebulizer solution Inhale 4 mL (1 mg) twice a day. (Patient not taking: Reported on 10/16/2024)      cyclobenzaprine (Flexeril) 10 mg tablet Take 1 tablet (10 mg) by mouth if needed for muscle spasms. 90 tablet 3    meclizine (Antivert) 25 mg tablet Take 1 tablet (25 mg) by mouth 3 times a day as needed for dizziness. 90 tablet 3    oxyCODONE-acetaminophen (Percocet)  mg tablet Take 1 tablet by mouth every 4 hours if needed for severe pain (7 - 10). 112 tablet 0    predniSONE (Deltasone) 10 mg tablet Take 1 tablet (10 mg) by mouth once daily. 30 tablet 3    sulfaSALAzine (Azulfidine) 500 mg DR tablet Take 1 tablet (500 mg) by mouth 2 times a day. Do not crush, chew, or split. 180 tablet 3    traMADol (Ultram) 50 mg tablet Take 1 tablet (50 mg) by mouth 2 times a day. 60 tablet 3    valACYclovir (Valtrex) 1 gram tablet Take 1 tablet (1,000 mg) by mouth once daily.       No current facility-administered medications for this  "visit.      Allergies   Allergen Reactions    Penicillin Shortness of breath    Penicillins Shortness of breath, Anaphylaxis and Swelling     Reaction was when patient was a child. Pts mother told her about it.    Bee Pollen Unknown    House Dust Unknown        Vitals:    10/16/24 1409   BP: 130/70   BP Location: Right arm   Patient Position: Sitting   BP Cuff Size: Adult   Pulse: 76   SpO2: 99%   Weight: 74.6 kg (164 lb 8 oz)   Height: 1.638 m (5' 4.5\")             Rapid 3  Function Score (FN): 7.7  Pain Score (PN) (0-10): 9  Patient Global (PTGL) (0-10): 9.5  Rapid3 Score: 26.2  RAPID3 Weighted Score: 8.73     Workup  Component      Latest Ref Rng 5/30/2024   LEUKOCYTES (10*3/UL) IN BLOOD BY AUTOMATED COUNT, [place]      4.4 - 11.3 x10*3/uL 5.8    nRBC      0.0 - 0.0 /100 WBCs 0.0    ERYTHROCYTES (10*6/UL) IN BLOOD BY AUTOMATED COUNT, [place]      4.00 - 5.20 x10*6/uL 4.47    HEMOGLOBIN      12.0 - 16.0 g/dL 12.2    HEMATOCRIT      36.0 - 46.0 % 39.0    MCV      80 - 100 fL 87    MCH      26.0 - 34.0 pg 27.3    MCHC      32.0 - 36.0 g/dL 31.3 (L)    RED CELL DISTRIBUTION WIDTH      11.5 - 14.5 % 12.9    PLATELETS (10*3/UL) IN BLOOD AUTOMATED COUNT, [place]      150 - 450 x10*3/uL 281    NEUTROPHILS/100 LEUKOCYTES IN BLOOD BY AUTOMATED COUNT, [place]      40.0 - 80.0 % 47.7    Immature Granulocytes %, Automated      0.0 - 0.9 % 0.3    Lymphocytes %      13.0 - 44.0 % 43.5    Monocytes %      2.0 - 10.0 % 6.8    Eosinophils %      0.0 - 6.0 % 1.2    Basophils %      0.0 - 2.0 % 0.5    NEUTROPHILS (10*3/UL) IN BLOOD BY AUTOMATED COUNT, [place]      1.20 - 7.70 x10*3/uL 2.74    Immature Granulocytes Absolute, Automated      0.00 - 0.70 x10*3/uL 0.02    Lymphocytes Absolute      1.20 - 4.80 x10*3/uL 2.50    Monocytes Absolute      0.10 - 1.00 x10*3/uL 0.39    Eosinophils Absolute      0.00 - 0.70 x10*3/uL 0.07    Basophils Absolute      0.00 - 0.10 x10*3/uL 0.03    GLUCOSE      74 - 99 mg/dL 89    SODIUM      136 " - 145 mmol/L 141    POTASSIUM      3.5 - 5.3 mmol/L 4.1    CHLORIDE      98 - 107 mmol/L 106    Bicarbonate      21 - 32 mmol/L 28    Anion Gap      10 - 20 mmol/L 11    Blood Urea Nitrogen      6 - 23 mg/dL 9    Creatinine      0.50 - 1.05 mg/dL 0.77    EGFR      >60 mL/min/1.73m*2 >90    Calcium      8.6 - 10.6 mg/dL 9.6    Albumin      3.4 - 5.0 g/dL 4.2    Alkaline Phosphatase      33 - 110 U/L 49    Total Protein      6.4 - 8.2 g/dL 6.7    AST      9 - 39 U/L 13    Bilirubin Total      0.0 - 1.2 mg/dL 0.4    ALT      7 - 45 U/L 10    C-Reactive Protein      <1.00 mg/dL <0.10    Creatine Kinase      0 - 215 U/L 80    Sed Rate      0 - 20 mm/h 7    Vit D, 1,25-Dihydroxy      19.9 - 79.3 pg/mL 108.0 (H)    Vitamin D, 25-Hydroxy, Total      30 - 100 ng/mL 43    Interleukin 6      <=2.0 pg/mL <2.0    Scan Result Vectra 16 (low)   Scan Result 14.3.3 neg   Angio Converting Enzyme      16 - 85 U/L 68             Assessment/Plan  1. Polyarthritis with negative rheumatoid factor (Multi)    2. Other chronic pain    3. Poor tolerance for ambulation    4. Vertigo    5. Systemic lupus erythematosus with organ system involvement (Multi)    6. Encounter for long-term (current) use of medications    7. Vitamin D deficiency           Orders Placed This Encounter   Procedures    Power mobility device    Power mobility device    14.3.3; [facility]; [id number] - Miscellaneous Test    CBC and Auto Differential    Comprehensive Metabolic Panel    C-Reactive Protein    Creatine Kinase    Sedimentation Rate    Vitamin D 25-Hydroxy,Total (for eval of Vitamin D levels)    Vitamin D 1,25 Dihydroxy (for eval of hypercalcemia)    Angiotensin Converting Enzyme    Albumin-Creatinine Ratio, Urine Random    C3 Complement    C1Q Complement    Anti-DNA Antibody, Double-Stranded    C4 Complement    Creatinine, Urine Random    Interleukin-6          Since last appt, adherent and tolerating since April Simponi q 8 wks; Gets about 3 good weeks   Shuffling,   hurting all over off, cramping  Denies any recent or current infection.  Not on any NSAIDs or glucocorticoids.  1.5 pain  pills last about 3 hours  ROS+ for drenching nightsweats without fever, fatigue, ONOFRE, joint pain/swelling/stiffness, back pain, raynaud's without pitting/ulceration, weakness hands legs, depression/anxiety.  Rapid 3 consistent with high severity.  Labs reviewed  D/w pt tx options and decided on   Increase Simponi frequency to every 4 wks to aim at remission.  Advised of possible side effects and importance of monitoring.   All questions answered.  Patient to follow up with primary care provider regarding all other medical issues not addressed today and for medical chart updating.     Enedina Hernandez MD      Patient Care Team:  Dora Zamora MD as PCP - General (Family Medicine)  Dora Zamora MD as PCP - [facility] Medicaid PCP  Dora Zamora MD as PCP - [place] PCP  Deborah Leija as Care Manager (Case Management)  Enedina Hernandez MD as Consulting Physician (Rheumatology)

## 2024-10-16 NOTE — PROGRESS NOTES
Reviewed chart and call to patient.  She had appointment earlier this afternoon with Dr. Garcia (note not signed and unable to review) but updates me that she is onboard with submitting request to [employer] to authorize Symponi infusions to monthly due to effectiveness wearing off around week 3.  If not approved, she is going to consider switching to Rem[employer] infusion.  Patient had her scooter evaluation with PT who did recommend device and sent information to [employer].  Patient is going to follow up with them to see what options of scooters are available as she found one at [facility] that is collapsible and lightweight.  Discussed asking insurance if she purchased outright, would they reimburse her?  She was not able to call and schedule appointment with PCP before maternity leave but will try to get appointment with one of the covering providers.  No other concerns voiced at this time.

## 2024-10-23 DIAGNOSIS — M06.09 POLYARTHRITIS WITH NEGATIVE RHEUMATOID FACTOR (MULTI): Primary | ICD-10-CM

## 2024-10-25 ENCOUNTER — TELEPHONE (OUTPATIENT)
Dept: RHEUMATOLOGY | Facility: CLINIC | Age: 44
End: 2024-10-25

## 2024-10-25 ENCOUNTER — APPOINTMENT (OUTPATIENT)
Dept: INFUSION THERAPY | Facility: CLINIC | Age: 44
End: 2024-10-25
Payer: COMMERCIAL

## 2024-10-25 VITALS
BODY MASS INDEX: 28.22 KG/M2 | RESPIRATION RATE: 18 BRPM | DIASTOLIC BLOOD PRESSURE: 75 MMHG | TEMPERATURE: 98.6 F | WEIGHT: 167 LBS | HEART RATE: 67 BPM | SYSTOLIC BLOOD PRESSURE: 117 MMHG | OXYGEN SATURATION: 100 %

## 2024-10-25 DIAGNOSIS — M06.09 POLYARTHRITIS WITH NEGATIVE RHEUMATOID FACTOR (MULTI): ICD-10-CM

## 2024-10-25 LAB — PREGNANCY TEST URINE, POC: NEGATIVE

## 2024-10-25 RX ORDER — KETOROLAC TROMETHAMINE 30 MG/ML
30 INJECTION, SOLUTION INTRAMUSCULAR; INTRAVENOUS ONCE
OUTPATIENT
Start: 2024-11-22 | End: 2024-11-22

## 2024-10-25 RX ORDER — EPINEPHRINE 0.3 MG/.3ML
0.3 INJECTION SUBCUTANEOUS EVERY 5 MIN PRN
OUTPATIENT
Start: 2024-11-21

## 2024-10-25 RX ORDER — KETOROLAC TROMETHAMINE 30 MG/ML
30 INJECTION, SOLUTION INTRAMUSCULAR; INTRAVENOUS ONCE
Status: COMPLETED | OUTPATIENT
Start: 2024-10-25 | End: 2024-10-25

## 2024-10-25 RX ORDER — FAMOTIDINE 10 MG/ML
20 INJECTION INTRAVENOUS ONCE AS NEEDED
OUTPATIENT
Start: 2024-11-21

## 2024-10-25 RX ORDER — DIPHENHYDRAMINE HYDROCHLORIDE 50 MG/ML
50 INJECTION INTRAMUSCULAR; INTRAVENOUS AS NEEDED
OUTPATIENT
Start: 2024-11-21

## 2024-10-25 RX ORDER — ALBUTEROL SULFATE 0.83 MG/ML
3 SOLUTION RESPIRATORY (INHALATION) AS NEEDED
OUTPATIENT
Start: 2024-11-21

## 2024-10-25 ASSESSMENT — ENCOUNTER SYMPTOMS
ARTHRALGIAS: 1
EXTREMITY WEAKNESS: 1

## 2024-10-25 ASSESSMENT — PAIN SCALES - GENERAL: PAINLEVEL_OUTOF10: 6

## 2024-10-25 NOTE — PATIENT INSTRUCTIONS
Today :We administered golimumab (Simponi Aria) 150 mg in sodium chloride 0.9% 100 mL IV, methylPREDNISolone sod succinate, and ketorolac.     For:   1. 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     (Tell all doctors including dentists that you are taking this medication)     Go to the emergency room or call 911 if:  -You have signs of allergic reaction:   -Rash, hives, itching.   -Swollen, blistered, peeling skin.   -Swelling of face, lips, mouth, tongue or throat.   -Tightness of chest, trouble breathing, swallowing or talking     Call your doctor:  - If IV / injection site gets red, warm, swollen, itchy or leaks fluid or pus.     (Leave dressing on your IV site for at least 2 hours and keep area clean and dry  - If you get sick or have symptoms of infection or are not feeling well for any reason.    (Wash your hands often, stay away from people who are sick)  - If you have side effects from your medication that do not go away or are bothersome.     (Refer to the teaching your nurse gave you for side effects to call your doctor about)    - Common side effects may include:  stuffy nose, headache, feeling tired, muscle aches, upset stomach  - Before receiving any vaccines     - Call the [facility] at   If:  - You get sick, are on antibiotics, have had a recent vaccine, have surgery or dental work and your doctor wants your visit rescheduled.  - You need to cancel and reschedule your visit for any reason. Call at least 2 days before your visit if you need to cancel.   - Your insurance changes before your next visit.    (We will need to get approval from your new insurance. This can take up to two weeks.)     The [facility] is opened Monday thru Friday. We are closed on weekends and holidays.   Voice mail will take your call if  the center is closed. If you leave a message please allow 24 hours for a call back during weekdays. If you leave a message on a weekend/holiday, we will call you back the next business day.

## 2024-10-25 NOTE — PROGRESS NOTES
[facility]   Infusion Clinic Note   Date: 2024   Name: Susan Ford  : 1980   MRN: [id number]          Reason for Visit: OP Infusion (SIMPONI)         Today: We administered golimumab (Simponi Aria) 150 mg in sodium chloride 0.9% 100 mL IV, methylPREDNISolone sod succinate, and ketorolac.       Visit Type: INFUSION       Ordered By: Dr. Garcia       Accompanied by:       Diagnosis: Polyarthritis with negative rheumatoid factor (Multi)        Allergies:   Allergies as of 10/25/2024 - Reviewed 10/25/2024   Allergen Reaction Noted    Penicillin Shortness of breath 2023    Penicillins Shortness of breath, Anaphylaxis, and Swelling 10/29/2008    Bee pollen Unknown 01/10/2024    House dust Unknown 01/10/2024          Current Medications has a current medication list which includes the following prescription(s): albuterol, albuterol, benzonatate, cyclobenzaprine, dupixent pen, epipen 2-mohamud, fexofenadine, fluticasone, trelegy ellipta, folic acid, lidocaine, meclizine, dulera, naloxone, nebulizer accessories, oxycodone-acetaminophen, prednisone, sulfasalazine, and tramadol.       Vitals:   Vitals:    10/25/24 1106 10/25/24 1216   BP: 118/78 117/75   Pulse: 73 67   Resp: 18 18   Temp: 36.9 °C (98.4 °F) 37 °C (98.6 °F)   TempSrc: Temporal    SpO2: 98% 100%   Weight: 75.8 kg (167 lb)    PainSc:   6    PainLoc: Generalized              Infusion Pre-procedure Checklist:   - Allergies reviewed: yes   - Medications reviewed: yes       - Previous reaction to current treatment: no      Assess patient for the concerns below. Document provider notification as appropriate.  - Active or recent infection with/without current antibiotic use: no  - Recent or planned invasive dental work: no  - Recent or planned surgeries: no  - Recently received or plans to receive vaccinations: no  - Has treatment related toxicities: no  - Is pregnant:  no. Urine pregnancy test negative today  10/25/24      Pain: 6   - Is the pain different from normal: no   - Is your Doctor aware:  n/a       Labs:  reviewed          Fall Risk Screening: Bar Fall Risk  History of Falling, Immediate or Within 3 Months: Yes  Secondary Diagnosis: Yes  Ambulatory Aid: Crutches/cane/walker  Intravenous Therapy/Heparin Lock: Yes  Gait/Transferring: Normal/bedrest/immobile  Mental Status: Oriented to own ability  Bar Fall Risk Score: 75       Review Of Systems:  Review of Systems   Respiratory:          Dyspnea with exertion   Musculoskeletal:  Positive for arthralgias.   Neurological:  Positive for extremity weakness.        Patient has history of lupus and has lower extremity weakness and ambulates with a cane         ROS completed? Yes      Infusion Readiness:  - Assessment Concerns Related to Infusion: No  - Provider notified: n/a      Document Below Only If Indicated:   New Patient Education:    N/A (returning patient for continuation of therapy. Ongoing education provided as needed.)        Treatment Conditions & Drug Specific Questions:    Golimumab  (SIMPONI)    (Unless otherwise specified on patient specific therapy plan):     TREATMENT CONDITIONS:  Unless otherwise specified on patient specific thearpy plan HOLD and notify provider prior to proceeding with today's infusion if patient with:  o Positive T-Spot  o Positive Hepatitis B Surface Ag / Hepatitis C     Lab Results   Component Value Date    TBGRES Negative  Reference range: NEGATIVE   10/14/2020    TSPOTR  09/12/2023     Negative  Reference range: Normal Value: Negative    A negative test result does not exclude the possibility of exposure  to   or infection with Mycobacterium tuberculosis (M. tuberculosis).    Patients with recent exposure to TB infected individuals exhibiting a   negative T-SPOT.TB result should be considered for retesting within 6   weeks or if other relevant clinical symptoms indicate.  Results from   T-SPOT.TB testing must be used in  "conjunction with each individual's   epidemiological history, current medical status, and results of other   diagnostic evaluations.  The T-SPOT.TB test is qualitative and  results   are reported as positive, borderline or negative, given that the test   controls perform as expected. In line with the Centers for Disease   Control and Prevention's 2010 recommendation to report quantitative   measurements alongside the qualitative result, the laboratory  provides   spot counts for informational purposes only.  The T-SPOT.TB test  should   not be interpreted as a quantitative test.  Test performed at:  [address] [address]        Lab Results   Component Value Date    HEPBSAG NEGATIVE 10/14/2020      No results found for: \"NONUHFIRE\", \"NONUHSWGH\", \"NONUHFISH\", \"EXTHEPBSAG\"  Lab Results   Component Value Date    HEPCAB  10/14/2020     Negative  Reference range: NEGATIVE  Performed at the [facility] unless   otherwise noted.        Lab Results   Component Value Date    HEPCAB  10/14/2020     Negative  Reference range: NEGATIVE  Performed at the [facility] unless   otherwise noted.       No results found for: \"HBCTI\", \"HEPBCAB\"    Lab Results   Component Value Date    WBC 6.4 10/10/2024    HGB 13.0 10/10/2024    HCT 41.8 10/10/2024    MCV 85 10/10/2024     10/10/2024        Labs reviewed and patient meets treatment conditions? Yes    DRUG SPECIFIC QUESTIONS:   - Up to date on all immunizations per patient report? Yes    Available Immunization Records:  Immunization History   Administered Date(s) Administered    Flu vaccine (IIV4), preservative free *Check age/dose* 09/17/2020    Influenza, injectable, quadrivalent 10/14/2020    PPD Test 02/24/2012, 03/02/2012, 09/20/2019    Pneumococcal conjugate vaccine, 20-valent (PREVNAR 20) 06/21/2023    Td (adult) 02/26/2011    Tdap vaccine, age 7 year and older (BOOSTRIX, ADACEL) 09/17/2020, " 11/10/2020         - Any history of or new or worsening s/s of heart failure which may include dyspnea, edema? No  (If worsening s/s notify provider prior to proceeding. Simponi may cause exacerbation of heart failure)     - Any new diagnosis of cancer, especially lymphomas? No    (Box Warning: Malignancy. If YES notify prescribing provider prior to proceeding )      REMINDER:  WEIGHT BASED DRUG    Recommended Vitals/Observation:  Vitals: Take vital signs prior to starting infusion, at infusion conclusion and as needed.   Observation: No observation.        Weight Based Drug Calculations:    WEIGHT BASED DRUGS: Golimumab (SIMPONI)   Patient's dosing weight (kg): 74.8     10% weight variance for prescribed treatment: 67.4 kg to 82.2 kg     Patient's weight today:   Vitals:    10/25/24 1106   Weight: 75.8 kg (167 lb)         weight range for prescribed dose:     Patient weight today falls outside of 10% variance or  weight range: No     Home Care pharmacist informed of weight variance: Not applicable    Doses that are weight based have an acceptable variance rule within 10% of the prescribed   order and/or within  weight range. If patient weight on day of infusion falls   outside of the 10% variance, or weight range, infusion is administered and   pharmacy contacted regarding future dosing adjustments, per policy.         Initiated By: Lamonte Camacho RN     Line flushed with NS 30 ml when infusion bag completed. Patient tolerated well. Received order from Dr. Garcia to give PRN Toradol and PRN Solumedrol for pain control per patient's request. Patient tolerated well.

## 2024-11-06 NOTE — PROGRESS NOTES
Chief Complaint   Patient presents with    Left Knee - New Patient Visit, Pain     Discuss Options  Xray today     History of Present Illness:  11/08/24: Susan Ford is a 44 y.o. female presenting to clinic as an established patient with complaints of left knee pain. She is having significant weakness and pain in her knee. She had a fall recently due to the weakness. She has recurrent mechanical symptoms in her kneecap with popping. She had a left knee diagnostic arthroscopy followed by open knee anteromedial tibial tubercle transfer, patellofemoral ligament reconstruction, medial retinacular repair, lateral retinacular lengthening, and anterior compartment fasciotomy with Dr Walter 07/22/14. She then had a left knee arthroscopic debridement of scar tissue and open hardware removal of tibial screws with Dr. Walter on 07/21/15.    05/05/23: Patient returns today. Unfortunately the knee is not doing well. She has a mechanical history of catching and pain complaints. She had an injury to the right knee two weeks ago. She states she fell and felt something pop and buckle on the knee and it has been recurrently painful and unstable. She is limping and having pain. She states she feels like something is stuck and it has been swelling. She has been taking ibuprofen routinely and icing it. We have seen her in the past for her left knee recurrent patellofemoral instability. Well established care.     Imaging:  X-rays left knee: No acute fractures or dislocations.  No arthritic change and well-preserved joint space    Assessment:   Left knee recurrent patellofemoral instability status post prior surgery    Plan:  At this point, we discussed surgical options, specifically I recommended left knee diagnostic arthroscopy with open lateral release repair, possible embrication as well as fascia repair.    Risks, benefits, and alternatives to surgery were discussed including, but not limited to, infection, bleeding,  neurovascular injury, persistent pain and dysfunction.  We discussed specifically incomplete pain relief posttraumatic arthritis and stiffness, loss of function and motion. We discussed with her specifically this may be a staged procedure if she has recurrent instability or other cartilage disease, which is notable during the time of diagnostic arthroscopy.  We discussed with her specifically this may be incomplete pain relief, but hopefully we can improve some of her stability and knee cap complaints.    We will plan for postop pain relief.  [place]ARRS has been reviewed for Susan Ford and is consistent with prescribed medications. This report is scanned into the electronic medical record. The risks of abuse, dependence, addiction and diversion were considered.  The medication is felt to be clinically appropriate based on documented diagnosis and for her current pain leve       Physical Exam:  Well-nourished, well-developed. No acute distress. Alert and oriented x3. Responds appropriately to questioning. Good mood. Normal affect.  Physical Exam  Left Knee:  ROM: 120.  Trace effusion.  Well-healed surgical scars.  Some prominence to her anterior tibia from prior distal fasciotomy.  Positive Claire´s test/Positive Apley Grind  Neurovascular exam normal distally  Palpable pedal pulse and good cap refill     Review of Systems:  GENERAL: Negative for malaise, significant weight loss, fever  MUSCULOSKELETAL: See HPI  NEURO:  Negative     Past Medical History:   Diagnosis Date    Asthma     Cervicalgia     Chronic pain syndrome     Frequent falls     GERD (gastroesophageal reflux disease)     Knee pain, right     Snoring     Snoring    UTI (urinary tract infection)        Medication Documentation Review Audit       Reviewed by Lamonte Camacho RN (Registered Nurse) on 10/25/24 at 1109      Medication Order Taking? Sig Documenting Provider Last Dose Status   albuterol (Ventolin HFA) 90 mcg/actuation inhaler [id number] No  Inhale 1 puff every 4 hours if needed for wheezing (inhale 1-2 puffs every 4 hours as needed.). Dora Zamora MD Taking  10/16/24 1194   albuterol 2.5 mg /3 mL (0.083 %) nebulizer solution [id number] No Take 3 mL (2.5 mg) by nebulization every 6 hours if needed for wheezing. Historical Provider, MD Taking Active   benzonatate (Tessalon) 100 mg capsule [id number] No Take 1 capsule (100 mg) by mouth 3 times a day as needed for cough. Do not crush or chew. Historical Provider, MD Taking Active   Patient not taking:  Discontinued 10/23/24 2334   cyclobenzaprine (Flexeril) 10 mg tablet [id number]  Take 1 tablet (10 mg) by mouth if needed for muscle spasms. Enedina Hernandez MD  Active   dupilumab (Dupixent Pen) 300 mg/2 mL injection [id number] No Inject 2 mL (300 mg) under the skin every 14 (fourteen) days. Historical Provider, MD Taking Active   EPINEPHrine (EpiPen 2-Milan) 0.3 mg/0.3 mL injection syringe [id number] No as directed Injection as directed for 1 days Historical Provider, MD Taking Active   fexofenadine (Allegra) 180 mg tablet [id number] No  Historical Provider, MD Taking Active   fluticasone (Flonase) 50 mcg/actuation nasal spray [id number] No Administer 1 spray into each nostril once daily. Shake gently. Before first use, prime pump. After use, clean tip and replace cap. Historical Provider, MD Taking Active   fluticasone-umeclidin-vilanter (Trelegy Ellipta) 200-62.5-25 mcg blister with device [id number] No Inhale 1 puff once daily. Historical Provider, MD Taking Active   folic acid (Folvite) 1 mg tablet [id number] No Take 1 tablet (1 mg) by mouth once daily. Enedina Hernandez MD Taking Active   lidocaine (Lidoderm) 5 % patch [id number]  Place 1 patch over 12 hours on the skin once daily. Remove & discard patch within 12 hours or as directed by MD. Enedina Hernandez MD  Active   meclizine (Antivert) 25 mg tablet [id number]  Take 1 tablet (25 mg) by mouth 3 times a day as needed for  dizziness. Enedina Hernandez MD  Active   mometasone-formoterol (Dulera) 200-5 mcg/actuation inhaler [id number] No Inhale 2 puffs twice a day. Historical Provider, MD Taking Active   naloxone (Narcan) 4 mg/0.1 mL nasal spray [id number] No Administer 1 spray (4 mg) into affected nostril(s) if needed for opioid reversal. May repeat in 2-3 minutes if needed, alternating nostrils Enedina Hernandez MD Taking Active   nebulizer accessories misc [id number] No Use as directed with nebulizer machine. Dora Zamora MD Taking Active   oxyCODONE-acetaminophen (Percocet)  mg tablet [id number]  Take 1 tablet by mouth every 4 hours if needed for severe pain (7 - 10). Enedina Hernandez MD  Active   polyethylene glycol (Glycolax, Miralax) 17 gram packet [id number]  Take 17 g by mouth once daily for 3 days. Enedina Hernandez MD   10/19/24 2359   predniSONE (Deltasone) 10 mg tablet [id number]  Take 1 tablet (10 mg) by mouth once daily. Enedina Hernandez MD  Active   sulfaSALAzine (Azulfidine) 500 mg DR tablet [id number]  Take 1 tablet (500 mg) by mouth 2 times a day. Do not crush, chew, or split. Enedina Hernandez MD  Active   traMADol (Ultram) 50 mg tablet [id number]  Take 1 tablet (50 mg) by mouth 2 times a day. Enedina Hernandez MD  Active   Discontinued 10/23/24 2334                    Allergies   Allergen Reactions    Penicillin Shortness of breath    Penicillins Shortness of breath, Anaphylaxis and Swelling     Reaction was when patient was a child. Pts mother told her about it.    Bee Pollen Unknown    House Dust Unknown       Social History     Socioeconomic History    Marital status:      Spouse name: woodrow    Number of children: 4    Years of education: Not on file    Highest education level: High school graduate   Occupational History    Occupation: customer service   Tobacco Use    Smoking status: Never    Smokeless tobacco: Never   Vaping Use    Vaping status: Never  Used   Substance and Sexual Activity    Alcohol use: Not Currently    Drug use: Never    Sexual activity: Yes   Other Topics Concern    Not on file   Social History Narrative    Not on file     Social Drivers of Health     Financial Resource Strain: Not on file   Food Insecurity: Unknown (12/9/2023)    [facility]    Hunger Vital Sign     Worried About Running Out of Food in the Last Year: Never true     Ran Out of Food in the Last Year: Not on file   Transportation Needs: Not on file   Physical Activity: Not on file   Stress: Not on file   Social Connections: Not on file   Intimate Partner Violence: Not on file   Housing Stability: Not on file       Past Surgical History:   Procedure Laterality Date    APPENDECTOMY  11/21/2013    Appendectomy    HERNIA REPAIR  11/21/2013    Hernia Repair    PATELLAR TENDON REPAIR Left     TUBAL LIGATION  11/21/2013    Tubal Ligation    WISDOM TOOTH EXTRACTION         No results found.       Scribe Attestation  By signing my name below, Leela MAHMOOD Scribe   attest that this documentation has been prepared under the direction and in the presence of Hayden Goldman MD.

## 2024-11-08 ENCOUNTER — OFFICE VISIT (OUTPATIENT)
Dept: ORTHOPEDIC SURGERY | Facility: CLINIC | Age: 44
End: 2024-11-08
Payer: COMMERCIAL

## 2024-11-08 ENCOUNTER — HOSPITAL ENCOUNTER (OUTPATIENT)
Dept: RADIOLOGY | Facility: CLINIC | Age: 44
Discharge: HOME | End: 2024-11-08
Payer: COMMERCIAL

## 2024-11-08 DIAGNOSIS — M25.562 LEFT KNEE PAIN, UNSPECIFIED CHRONICITY: ICD-10-CM

## 2024-11-08 PROCEDURE — 99214 OFFICE O/P EST MOD 30 MIN: CPT | Performed by: ORTHOPAEDIC SURGERY

## 2024-11-08 PROCEDURE — 73564 X-RAY EXAM KNEE 4 OR MORE: CPT | Mod: LT

## 2024-11-12 PROBLEM — S83.242A OTHER TEAR OF MEDIAL MENISCUS, CURRENT INJURY, LEFT KNEE, INITIAL ENCOUNTER: Status: ACTIVE | Noted: 2024-11-12

## 2024-11-15 ENCOUNTER — PATIENT OUTREACH (OUTPATIENT)
Dept: PRIMARY CARE | Facility: CLINIC | Age: 44
End: 2024-11-15
Payer: COMMERCIAL

## 2024-11-15 DIAGNOSIS — M06.9 RHEUMATIC JOINT DISEASE (MULTI): ICD-10-CM

## 2024-11-15 DIAGNOSIS — J45.909 ASTHMA, UNSPECIFIED ASTHMA SEVERITY, UNSPECIFIED WHETHER COMPLICATED, UNSPECIFIED WHETHER PERSISTENT (HHS-HCC): ICD-10-CM

## 2024-11-15 DIAGNOSIS — M06.09 POLYARTHRITIS WITH NEGATIVE RHEUMATOID FACTOR (MULTI): ICD-10-CM

## 2024-11-15 DIAGNOSIS — G89.29 OTHER CHRONIC PAIN: ICD-10-CM

## 2024-11-15 NOTE — PROGRESS NOTES
Chart reviewed and reached out to patient.  Due to knee weakness sustained significant fall down steps with injury to knee and elbow.  She is currently on STD which is effective until April.  Decision made to take this time to have surgery on knee and elbow which she has been putting off.  Still waiting to hear on Symponi infusion being increased to every 4 weeks, although states cannot have dose within 1 month of surgery due to delayed wound healing.  She has positive mind set as she understands cannot control these situations and trying to make the best of what she has.  She is doing small projects to keep productive around home but also allowing periods of rest and healing.  CM will follow up after knee surgery

## 2024-11-18 DIAGNOSIS — M06.09 POLYARTHRITIS WITH NEGATIVE RHEUMATOID FACTOR (MULTI): ICD-10-CM

## 2024-11-18 DIAGNOSIS — G89.29 OTHER CHRONIC PAIN: ICD-10-CM

## 2024-11-19 RX ORDER — OXYCODONE AND ACETAMINOPHEN 10; 325 MG/1; MG/1
1 TABLET ORAL EVERY 4 HOURS PRN
Qty: 112 TABLET | Refills: 0 | Status: SHIPPED | OUTPATIENT
Start: 2024-11-19

## 2024-11-19 RX ORDER — OXYCODONE AND ACETAMINOPHEN 10; 325 MG/1; MG/1
1 TABLET ORAL EVERY 4 HOURS PRN
Qty: 112 TABLET | Refills: 0 | Status: SHIPPED | OUTPATIENT
Start: 2024-11-19 | End: 2024-11-20 | Stop reason: SDUPTHER

## 2024-11-20 DIAGNOSIS — G89.29 OTHER CHRONIC PAIN: ICD-10-CM

## 2024-11-20 DIAGNOSIS — M06.09 POLYARTHRITIS WITH NEGATIVE RHEUMATOID FACTOR (MULTI): ICD-10-CM

## 2024-11-20 RX ORDER — OXYCODONE AND ACETAMINOPHEN 10; 325 MG/1; MG/1
1 TABLET ORAL EVERY 6 HOURS PRN
Qty: 120 TABLET | Refills: 0 | Status: SHIPPED | OUTPATIENT
Start: 2024-11-20

## 2024-11-26 ENCOUNTER — APPOINTMENT (OUTPATIENT)
Dept: ORTHOPEDIC SURGERY | Facility: CLINIC | Age: 44
End: 2024-11-26
Payer: COMMERCIAL

## 2024-11-27 ENCOUNTER — APPOINTMENT (OUTPATIENT)
Dept: INFUSION THERAPY | Facility: CLINIC | Age: 44
End: 2024-11-27
Payer: COMMERCIAL

## 2024-11-27 VITALS
DIASTOLIC BLOOD PRESSURE: 56 MMHG | WEIGHT: 169 LBS | HEART RATE: 75 BPM | OXYGEN SATURATION: 100 % | SYSTOLIC BLOOD PRESSURE: 116 MMHG | TEMPERATURE: 98.7 F | RESPIRATION RATE: 18 BRPM | BODY MASS INDEX: 28.56 KG/M2

## 2024-11-27 DIAGNOSIS — M06.09 POLYARTHRITIS WITH NEGATIVE RHEUMATOID FACTOR (MULTI): ICD-10-CM

## 2024-11-27 LAB — PREGNANCY TEST URINE, POC: NEGATIVE

## 2024-11-27 RX ORDER — KETOROLAC TROMETHAMINE 30 MG/ML
30 INJECTION, SOLUTION INTRAMUSCULAR; INTRAVENOUS ONCE
OUTPATIENT
Start: 2024-12-20 | End: 2024-12-20

## 2024-11-27 RX ORDER — ALBUTEROL SULFATE 0.83 MG/ML
3 SOLUTION RESPIRATORY (INHALATION) AS NEEDED
OUTPATIENT
Start: 2024-12-20

## 2024-11-27 RX ORDER — EPINEPHRINE 0.3 MG/.3ML
0.3 INJECTION SUBCUTANEOUS EVERY 5 MIN PRN
OUTPATIENT
Start: 2024-12-20

## 2024-11-27 RX ORDER — FAMOTIDINE 10 MG/ML
20 INJECTION INTRAVENOUS ONCE AS NEEDED
OUTPATIENT
Start: 2024-12-20

## 2024-11-27 RX ORDER — KETOROLAC TROMETHAMINE 30 MG/ML
30 INJECTION, SOLUTION INTRAMUSCULAR; INTRAVENOUS ONCE
Status: COMPLETED | OUTPATIENT
Start: 2024-11-27 | End: 2024-11-27

## 2024-11-27 RX ORDER — DIPHENHYDRAMINE HYDROCHLORIDE 50 MG/ML
50 INJECTION INTRAMUSCULAR; INTRAVENOUS AS NEEDED
OUTPATIENT
Start: 2024-12-20

## 2024-11-27 ASSESSMENT — ENCOUNTER SYMPTOMS
FREQUENCY: 0
SHORTNESS OF BREATH: 0
DYSURIA: 0
FATIGUE: 1
VOICE CHANGE: 0
HEADACHES: 0
BLOOD IN STOOL: 0
HEMATURIA: 0
DIZZINESS: 1
PALPITATIONS: 0
NUMBNESS: 0
SORE THROAT: 0
LEG SWELLING: 0
ARTHRALGIAS: 0
WOUND: 0
LIGHT-HEADEDNESS: 0
TROUBLE SWALLOWING: 0
WHEEZING: 0
EYE PROBLEMS: 0
FEVER: 0
VOMITING: 0
NAUSEA: 0
APPETITE CHANGE: 0
UNEXPECTED WEIGHT CHANGE: 0
EXTREMITY WEAKNESS: 0
ABDOMINAL PAIN: 0
CONSTIPATION: 0
COUGH: 0
DIARRHEA: 0
CHILLS: 0
MYALGIAS: 0

## 2024-11-27 ASSESSMENT — PAIN SCALES - GENERAL: PAINLEVEL_OUTOF10: 8

## 2024-11-27 NOTE — PROGRESS NOTES
[facility]   Infusion Clinic Note   Date: 2024   Name: Susan Ford  : 1980   MRN: [id number]          Reason for Visit: OP Infusion (Simponi)         Today: We administered methylPREDNISolone sod succinate, ketorolac, and golimumab (Simponi Aria) 150 mg in sodium chloride 0.9% 100 mL IV.       Visit Type: INFUSION       Ordered By: Dr. Garcia       Accompanied by:Child/Children       Diagnosis: Polyarthritis with negative rheumatoid factor (Multi)        Allergies:   Allergies as of 2024 - Reviewed 2024   Allergen Reaction Noted    Penicillin Shortness of breath 2023    Penicillins Shortness of breath, Anaphylaxis, and Swelling 10/29/2008    Bee pollen Unknown 01/10/2024    House dust Unknown 01/10/2024          Current Medications has a current medication list which includes the following prescription(s): albuterol, albuterol, benzonatate, cyclobenzaprine, dupixent pen, epipen 2-mohamud, fexofenadine, fluticasone, trelegy ellipta, folic acid, lidocaine, meclizine, dulera, naloxone, nebulizer accessories, oxycodone-acetaminophen, oxycodone-acetaminophen, prednisone, sulfasalazine, and tramadol.       Vitals:   Vitals:    24 1312 24 1443   BP: 117/58 116/56   Pulse: 61 75   Resp: 18 18   Temp: 36.8 °C (98.3 °F) 37.1 °C (98.7 °F)   TempSrc: Temporal    SpO2:  100%   Weight: 76.7 kg (169 lb)    PainSc:   8    PainLoc: Generalized              Infusion Pre-procedure Checklist:   - Allergies reviewed: yes   - Medications reviewed: yes       - Previous reaction to current treatment: no      Assess patient for the concerns below. Document provider notification as appropriate.  - Active or recent infection with/without current antibiotic use: no  - Recent or planned invasive dental work: no  - Recent or planned surgeries: yes  - Recently received or plans to receive vaccinations: no  - Has treatment related toxicities: no  - Is pregnant:  no- POCT  urine pregnancy test done and negative today      Pain: 8   - Is the pain different from normal: no   - Is your Doctor aware:  n/a       Labs:  reviewed          Fall Risk Screening: Bar Fall Risk  History of Falling, Immediate or Within 3 Months: No  Secondary Diagnosis: Yes  Ambulatory Aid: Crutches/cane/walker  Intravenous Therapy/Heparin Lock: Yes  Gait/Transferring: Normal/bedrest/immobile  Mental Status: Oriented to own ability  Bar Fall Risk Score: 50       Review Of Systems:  Review of Systems   Constitutional:  Positive for fatigue. Negative for appetite change, chills, fever and unexpected weight change.   HENT:   Negative for hearing loss, mouth sores, sore throat, tinnitus, trouble swallowing and voice change.    Eyes:  Negative for eye problems.   Respiratory:  Negative for cough, shortness of breath and wheezing.    Cardiovascular:  Negative for chest pain, leg swelling and palpitations.   Gastrointestinal:  Negative for abdominal pain, blood in stool, constipation, diarrhea, nausea and vomiting.   Genitourinary:  Negative for dysuria, frequency and hematuria.    Musculoskeletal:  Negative for arthralgias and myalgias.   Skin:  Negative for itching, rash and wound.   Neurological:  Positive for dizziness. Negative for extremity weakness, headaches, light-headedness and numbness.        History of vertigo         ROS completed? Yes      Infusion Readiness:  - Assessment Concerns Related to Infusion: No  - Provider notified: n/a      Document Below Only If Indicated:   New Patient Education:    N/A (returning patient for continuation of therapy. Ongoing education provided as needed.)        Treatment Conditions & Drug Specific Questions:    Golimumab  (SIMPONI)    (Unless otherwise specified on patient specific therapy plan):     TREATMENT CONDITIONS:  Unless otherwise specified on patient specific thearpy plan HOLD and notify provider prior to proceeding with today's infusion if patient with:  o  "Positive T-Spot  o Positive Hepatitis B Surface Ag / Hepatitis C     Lab Results   Component Value Date    TBGRES Negative  Reference range: NEGATIVE   10/14/2020    TSPOTR  09/12/2023     Negative  Reference range: Normal Value: Negative    A negative test result does not exclude the possibility of exposure  to   or infection with Mycobacterium tuberculosis (M. tuberculosis).    Patients with recent exposure to TB infected individuals exhibiting a   negative T-SPOT.TB result should be considered for retesting within 6   weeks or if other relevant clinical symptoms indicate.  Results from   T-SPOT.TB testing must be used in conjunction with each individual's   epidemiological history, current medical status, and results of other   diagnostic evaluations.  The T-SPOT.TB test is qualitative and  results   are reported as positive, borderline or negative, given that the test   controls perform as expected. In line with the Centers for Disease   Control and Prevention's 2010 recommendation to report quantitative   measurements alongside the qualitative result, the laboratory  provides   spot counts for informational purposes only.  The T-SPOT.TB test  should   not be interpreted as a quantitative test.  Test performed at:  [address] [address]        Lab Results   Component Value Date    HEPBSAG NEGATIVE 10/14/2020      No results found for: \"NONUHFIRE\", \"NONUHSWGH\", \"NONUHFISH\", \"EXTHEPBSAG\"  Lab Results   Component Value Date    HEPCAB  10/14/2020     Negative  Reference range: NEGATIVE  Performed at the [facility] unless   otherwise noted.        Lab Results   Component Value Date    HEPCAB  10/14/2020     Negative  Reference range: NEGATIVE  Performed at the [facility] unless   otherwise noted.       No results found for: \"HBCTI\", \"HEPBCAB\"    Lab Results   Component Value Date    WBC 6.4 10/10/2024    HGB 13.0 10/10/2024    HCT 41.8 " 10/10/2024    MCV 85 10/10/2024     10/10/2024        Labs reviewed and patient meets treatment conditions? Yes    DRUG SPECIFIC QUESTIONS:   - Up to date on all immunizations per patient report? Yes    Available Immunization Records:  Immunization History   Administered Date(s) Administered    Flu vaccine (IIV4), preservative free *Check age/dose* 09/17/2020    Influenza, injectable, quadrivalent 10/14/2020    PPD Test 02/24/2012, 03/02/2012, 09/20/2019    Pneumococcal conjugate vaccine, 20-valent (PREVNAR 20) 06/21/2023    Td (adult) 02/26/2011    Tdap vaccine, age 7 year and older (BOOSTRIX, ADACEL) 09/17/2020, 11/10/2020         - Any history of or new or worsening s/s of heart failure which may include dyspnea, edema? No  (If worsening s/s notify provider prior to proceeding. Simponi may cause exacerbation of heart failure)     - Any new diagnosis of cancer, especially lymphomas? No    (Box Warning: Malignancy. If YES notify prescribing provider prior to proceeding )      REMINDER:  WEIGHT BASED DRUG    Recommended Vitals/Observation:  Vitals: Take vital signs prior to starting infusion, at infusion conclusion and as needed.   Observation: No observation.        Weight Based Drug Calculations:    WEIGHT BASED DRUGS: Golimumab (SIMPONI)   Patient's dosing weight (kg): 74.8     10% weight variance for prescribed treatment: 67.4 kg to 82.2 kg     Patient's weight today:   Vitals:    11/27/24 1312   Weight: 76.7 kg (169 lb)         weight range for prescribed dose:     Patient weight today falls outside of 10% variance or  weight range: No     Home Care pharmacist informed of weight variance: Not applicable    Doses that are weight based have an acceptable variance rule within 10% of the prescribed   order and/or within  weight range. If patient weight on day of infusion falls   outside of the 10% variance, or weight range, infusion is administered and   pharmacy  contacted regarding future dosing adjustments, per policy.         Initiated By: Lamonte Camacho, RN     1425 Line flushed with NS 50 ml when infusion bag completed.       Patient's questions were answered by nursing staff at the time of their infusion/injection visit. Patient was not independently evaluated by the Nurse Practitioner on site at the [facility].     11/27/24 at 2:52 PM - NEETA Mcdaniel-CNP

## 2024-11-27 NOTE — PATIENT INSTRUCTIONS
Today :We administered methylPREDNISolone sod succinate, ketorolac, and golimumab (Simponi Aria) 150 mg in sodium chloride 0.9% 100 mL IV.     For:   1. 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     (Tell all doctors including dentists that you are taking this medication)     Go to the emergency room or call 911 if:  -You have signs of allergic reaction:   -Rash, hives, itching.   -Swollen, blistered, peeling skin.   -Swelling of face, lips, mouth, tongue or throat.   -Tightness of chest, trouble breathing, swallowing or talking     Call your doctor:  - If IV / injection site gets red, warm, swollen, itchy or leaks fluid or pus.     (Leave dressing on your IV site for at least 2 hours and keep area clean and dry  - If you get sick or have symptoms of infection or are not feeling well for any reason.    (Wash your hands often, stay away from people who are sick)  - If you have side effects from your medication that do not go away or are bothersome.     (Refer to the teaching your nurse gave you for side effects to call your doctor about)    - Common side effects may include:  stuffy nose, headache, feeling tired, muscle aches, upset stomach  - Before receiving any vaccines     - Call the [facility] at   If:  - You get sick, are on antibiotics, have had a recent vaccine, have surgery or dental work and your doctor wants your visit rescheduled.  - You need to cancel and reschedule your visit for any reason. Call at least 2 days before your visit if you need to cancel.   - Your insurance changes before your next visit.    (We will need to get approval from your new insurance. This can take up to two weeks.)     The [facility] is opened Monday thru Friday. We are closed on weekends and holidays.   Voice mail will take your call if  the center is closed. If you leave a message please allow 24 hours for a call back during weekdays. If you leave a message on a weekend/holiday, we will call you back the next business day.    A pharmacist is available Monday - Friday from 8:30AM to 3:30PM to help answer any questions you may have about your prescriptions(s). Please call pharmacy at:    [facility]: [phone]  [facility]: [phone]  [facility]: [phone]

## 2024-12-02 ENCOUNTER — LAB (OUTPATIENT)
Dept: LAB | Facility: LAB | Age: 44
End: 2024-12-02
Payer: COMMERCIAL

## 2024-12-02 DIAGNOSIS — M32.10 SYSTEMIC LUPUS ERYTHEMATOSUS WITH ORGAN SYSTEM INVOLVEMENT (MULTI): ICD-10-CM

## 2024-12-02 DIAGNOSIS — S83.242A OTHER TEAR OF MEDIAL MENISCUS, CURRENT INJURY, LEFT KNEE, INITIAL ENCOUNTER: ICD-10-CM

## 2024-12-02 DIAGNOSIS — Z79.899 ENCOUNTER FOR LONG-TERM (CURRENT) USE OF MEDICATIONS: ICD-10-CM

## 2024-12-02 DIAGNOSIS — E55.9 VITAMIN D DEFICIENCY: ICD-10-CM

## 2024-12-02 DIAGNOSIS — M06.09 POLYARTHRITIS WITH NEGATIVE RHEUMATOID FACTOR (MULTI): ICD-10-CM

## 2024-12-02 LAB
25(OH)D3 SERPL-MCNC: 26 NG/ML (ref 30–100)
ALBUMIN SERPL BCP-MCNC: 4.4 G/DL (ref 3.4–5)
ALP SERPL-CCNC: 63 U/L (ref 33–110)
ALT SERPL W P-5'-P-CCNC: 11 U/L (ref 7–45)
ANION GAP SERPL CALC-SCNC: 14 MMOL/L (ref 10–20)
APTT PPP: 25 SECONDS (ref 27–38)
AST SERPL W P-5'-P-CCNC: 14 U/L (ref 9–39)
BASOPHILS # BLD AUTO: 0.05 X10*3/UL (ref 0–0.1)
BASOPHILS NFR BLD AUTO: 0.7 %
BILIRUB SERPL-MCNC: 0.4 MG/DL (ref 0–1.2)
BUN SERPL-MCNC: 7 MG/DL (ref 6–23)
C3 SERPL-MCNC: 142 MG/DL (ref 87–200)
C4 SERPL-MCNC: 43 MG/DL (ref 10–50)
CALCIUM SERPL-MCNC: 9.6 MG/DL (ref 8.6–10.6)
CHLORIDE SERPL-SCNC: 102 MMOL/L (ref 98–107)
CK SERPL-CCNC: 72 U/L (ref 0–215)
CO2 SERPL-SCNC: 27 MMOL/L (ref 21–32)
CREAT SERPL-MCNC: 0.69 MG/DL (ref 0.5–1.05)
CREAT UR-MCNC: 124.3 MG/DL (ref 20–320)
CRP SERPL-MCNC: 0.3 MG/DL
DSDNA AB SER-ACNC: <1 IU/ML
EGFRCR SERPLBLD CKD-EPI 2021: >90 ML/MIN/1.73M*2
EOSINOPHIL # BLD AUTO: 0.11 X10*3/UL (ref 0–0.7)
EOSINOPHIL NFR BLD AUTO: 1.5 %
ERYTHROCYTE [DISTWIDTH] IN BLOOD BY AUTOMATED COUNT: 12.5 % (ref 11.5–14.5)
ERYTHROCYTE [SEDIMENTATION RATE] IN BLOOD BY WESTERGREN METHOD: 11 MM/H (ref 0–20)
GLUCOSE SERPL-MCNC: 80 MG/DL (ref 74–99)
HCT VFR BLD AUTO: 42.8 % (ref 36–46)
HGB BLD-MCNC: 13.3 G/DL (ref 12–16)
IMM GRANULOCYTES # BLD AUTO: 0.03 X10*3/UL (ref 0–0.7)
IMM GRANULOCYTES NFR BLD AUTO: 0.4 % (ref 0–0.9)
INR PPP: 0.9 (ref 0.9–1.1)
LYMPHOCYTES # BLD AUTO: 3.97 X10*3/UL (ref 1.2–4.8)
LYMPHOCYTES NFR BLD AUTO: 53.7 %
MCH RBC QN AUTO: 26.5 PG (ref 26–34)
MCHC RBC AUTO-ENTMCNC: 31.1 G/DL (ref 32–36)
MCV RBC AUTO: 85 FL (ref 80–100)
MICROALBUMIN UR-MCNC: <7 MG/L
MICROALBUMIN/CREAT UR: NORMAL MG/G{CREAT}
MONOCYTES # BLD AUTO: 0.33 X10*3/UL (ref 0.1–1)
MONOCYTES NFR BLD AUTO: 4.5 %
NEUTROPHILS # BLD AUTO: 2.9 X10*3/UL (ref 1.2–7.7)
NEUTROPHILS NFR BLD AUTO: 39.2 %
NRBC BLD-RTO: 0 /100 WBCS (ref 0–0)
PLATELET # BLD AUTO: 409 X10*3/UL (ref 150–450)
POTASSIUM SERPL-SCNC: 4 MMOL/L (ref 3.5–5.3)
PROT SERPL-MCNC: 7.4 G/DL (ref 6.4–8.2)
PROTHROMBIN TIME: 10.6 SECONDS (ref 9.8–12.8)
RBC # BLD AUTO: 5.01 X10*6/UL (ref 4–5.2)
SODIUM SERPL-SCNC: 139 MMOL/L (ref 136–145)
WBC # BLD AUTO: 7.4 X10*3/UL (ref 4.4–11.3)

## 2024-12-02 PROCEDURE — 82164 ANGIOTENSIN I ENZYME TEST: CPT

## 2024-12-02 PROCEDURE — 85730 THROMBOPLASTIN TIME PARTIAL: CPT

## 2024-12-02 PROCEDURE — 86160 COMPLEMENT ANTIGEN: CPT

## 2024-12-02 PROCEDURE — 82306 VITAMIN D 25 HYDROXY: CPT

## 2024-12-02 PROCEDURE — 83529 ASAY OF INTERLEUKIN-6 (IL-6): CPT

## 2024-12-02 PROCEDURE — 85652 RBC SED RATE AUTOMATED: CPT

## 2024-12-02 PROCEDURE — 82570 ASSAY OF URINE CREATININE: CPT

## 2024-12-02 PROCEDURE — 83520 IMMUNOASSAY QUANT NOS NONAB: CPT

## 2024-12-02 PROCEDURE — 86140 C-REACTIVE PROTEIN: CPT

## 2024-12-02 PROCEDURE — 82652 VIT D 1 25-DIHYDROXY: CPT

## 2024-12-02 PROCEDURE — 80053 COMPREHEN METABOLIC PANEL: CPT

## 2024-12-02 PROCEDURE — 85025 COMPLETE CBC W/AUTO DIFF WBC: CPT

## 2024-12-02 PROCEDURE — 82043 UR ALBUMIN QUANTITATIVE: CPT

## 2024-12-02 PROCEDURE — 36415 COLL VENOUS BLD VENIPUNCTURE: CPT

## 2024-12-02 PROCEDURE — 85610 PROTHROMBIN TIME: CPT

## 2024-12-02 PROCEDURE — 86225 DNA ANTIBODY NATIVE: CPT

## 2024-12-02 PROCEDURE — 82550 ASSAY OF CK (CPK): CPT

## 2024-12-03 ENCOUNTER — PATIENT MESSAGE (OUTPATIENT)
Dept: RHEUMATOLOGY | Facility: CLINIC | Age: 44
End: 2024-12-03
Payer: COMMERCIAL

## 2024-12-03 DIAGNOSIS — M06.09 POLYARTHRITIS WITH NEGATIVE RHEUMATOID FACTOR (MULTI): ICD-10-CM

## 2024-12-03 DIAGNOSIS — R42 VERTIGO: ICD-10-CM

## 2024-12-03 DIAGNOSIS — Z78.9 POOR TOLERANCE FOR AMBULATION: Primary | ICD-10-CM

## 2024-12-03 LAB — ACE SERPL-CCNC: 81 U/L (ref 16–85)

## 2024-12-04 PROBLEM — M22.2X2 PATELLOFEMORAL DISORDERS, LEFT KNEE: Status: ACTIVE | Noted: 2024-11-12

## 2024-12-04 LAB
1,25(OH)2D SERPL-MCNC: 109 PG/ML (ref 19.9–79.3)
IL6 SERPL-MCNC: <2 PG/ML

## 2024-12-10 LAB — C1Q SERPL-MCNC: 16.4 MG/DL (ref 10.3–20.5)

## 2024-12-12 ENCOUNTER — TELEPHONE (OUTPATIENT)
Dept: ORTHOPEDIC SURGERY | Facility: CLINIC | Age: 44
End: 2024-12-12
Payer: COMMERCIAL

## 2024-12-12 NOTE — TELEPHONE ENCOUNTER
12/12/24  Spoke w/ pt and she does need crutches to use following sx.  Due to the distance between where she lives and our office, she would prefer to obtain the crutches through her local pharmacy, [facility], in [place].  I contacted them and LVM for the Home Health Care dept to contact us so we can fax them the information.

## 2024-12-12 NOTE — TELEPHONE ENCOUNTER
12/12/24  Recd return call from Aidee at [facility].  She said they had crutches in stock and chelsea be able to provide them to the pt provided insurance is verified.  They are contracted with pt's insurance and will contact pt once they receive approval and fit her with the crutches.  Contacted pt and let her know and she was very appreciative.

## 2024-12-13 DIAGNOSIS — G89.29 OTHER CHRONIC PAIN: ICD-10-CM

## 2024-12-13 DIAGNOSIS — M06.09 POLYARTHRITIS WITH NEGATIVE RHEUMATOID FACTOR (MULTI): ICD-10-CM

## 2024-12-13 LAB — SCAN RESULT: NORMAL

## 2024-12-13 NOTE — TELEPHONE ENCOUNTER
Pt is requesting a refill of Oxycodone 10  Pt says she wanted to call early to make sure she gets it before the holiday.  Rx last filled: 11/20/2024  Pharmacy verified.     Omar Khan MA

## 2024-12-17 DIAGNOSIS — M25.562 LEFT KNEE PAIN, UNSPECIFIED CHRONICITY: Primary | ICD-10-CM

## 2024-12-17 RX ORDER — OXYCODONE AND ACETAMINOPHEN 10; 325 MG/1; MG/1
1 TABLET ORAL EVERY 6 HOURS PRN
Qty: 120 TABLET | Refills: 0 | Status: SHIPPED | OUTPATIENT
Start: 2024-12-17

## 2024-12-17 RX ORDER — CEFAZOLIN SODIUM 2 G/100ML
2 INJECTION, SOLUTION INTRAVENOUS ONCE
Status: CANCELLED | OUTPATIENT
Start: 2024-12-17 | End: 2024-12-17

## 2024-12-17 RX ORDER — OXYCODONE AND ACETAMINOPHEN 5; 325 MG/1; MG/1
1 TABLET ORAL EVERY 6 HOURS PRN
Qty: 20 TABLET | Refills: 0 | Status: SHIPPED | OUTPATIENT
Start: 2024-12-18 | End: 2024-12-25

## 2024-12-18 ENCOUNTER — ANESTHESIA (OUTPATIENT)
Dept: OPERATING ROOM | Facility: HOSPITAL | Age: 44
End: 2024-12-18
Payer: COMMERCIAL

## 2024-12-18 ENCOUNTER — ANESTHESIA EVENT (OUTPATIENT)
Dept: OPERATING ROOM | Facility: HOSPITAL | Age: 44
End: 2024-12-18
Payer: COMMERCIAL

## 2024-12-18 ENCOUNTER — HOSPITAL ENCOUNTER (OUTPATIENT)
Facility: HOSPITAL | Age: 44
Setting detail: OUTPATIENT SURGERY
Discharge: HOME | End: 2024-12-18
Attending: ORTHOPAEDIC SURGERY | Admitting: ORTHOPAEDIC SURGERY
Payer: COMMERCIAL

## 2024-12-18 VITALS
HEART RATE: 60 BPM | RESPIRATION RATE: 16 BRPM | TEMPERATURE: 97.5 F | OXYGEN SATURATION: 99 % | DIASTOLIC BLOOD PRESSURE: 66 MMHG | BODY MASS INDEX: 28.17 KG/M2 | HEIGHT: 64 IN | WEIGHT: 165 LBS | SYSTOLIC BLOOD PRESSURE: 131 MMHG

## 2024-12-18 DIAGNOSIS — M22.2X2 PATELLOFEMORAL DISORDERS, LEFT KNEE: Primary | ICD-10-CM

## 2024-12-18 LAB
ABO GROUP (TYPE) IN BLOOD: NORMAL
ANTIBODY SCREEN: NORMAL
HCG UR QL IA.RAPID: NEGATIVE
RH FACTOR (ANTIGEN D): NORMAL

## 2024-12-18 PROCEDURE — 3600000004 HC OR TIME - INITIAL BASE CHARGE - PROCEDURE LEVEL FOUR: Performed by: ORTHOPAEDIC SURGERY

## 2024-12-18 PROCEDURE — A27422 PR FIX UNSTABLE PATELLA,EXTEN REALIGN: Performed by: ANESTHESIOLOGIST ASSISTANT

## 2024-12-18 PROCEDURE — 3700000002 HC GENERAL ANESTHESIA TIME - EACH INCREMENTAL 1 MINUTE: Performed by: ORTHOPAEDIC SURGERY

## 2024-12-18 PROCEDURE — 86901 BLOOD TYPING SEROLOGIC RH(D): CPT | Performed by: PHYSICIAN ASSISTANT

## 2024-12-18 PROCEDURE — 64447 NJX AA&/STRD FEMORAL NRV IMG: CPT | Performed by: ANESTHESIOLOGY

## 2024-12-18 PROCEDURE — 2500000005 HC RX 250 GENERAL PHARMACY W/O HCPCS: Performed by: PHYSICIAN ASSISTANT

## 2024-12-18 PROCEDURE — 7100000010 HC PHASE TWO TIME - EACH INCREMENTAL 1 MINUTE: Performed by: ORTHOPAEDIC SURGERY

## 2024-12-18 PROCEDURE — A27422 PR FIX UNSTABLE PATELLA,EXTEN REALIGN: Performed by: ANESTHESIOLOGY

## 2024-12-18 PROCEDURE — 2500000004 HC RX 250 GENERAL PHARMACY W/ HCPCS (ALT 636 FOR OP/ED): Performed by: ANESTHESIOLOGIST ASSISTANT

## 2024-12-18 PROCEDURE — 2500000004 HC RX 250 GENERAL PHARMACY W/ HCPCS (ALT 636 FOR OP/ED): Mod: JZ | Performed by: ORTHOPAEDIC SURGERY

## 2024-12-18 PROCEDURE — 27422 REVISION OF UNSTABLE KNEECAP: CPT | Performed by: ORTHOPAEDIC SURGERY

## 2024-12-18 PROCEDURE — 7100000009 HC PHASE TWO TIME - INITIAL BASE CHARGE: Performed by: ORTHOPAEDIC SURGERY

## 2024-12-18 PROCEDURE — 7100000001 HC RECOVERY ROOM TIME - INITIAL BASE CHARGE: Performed by: ORTHOPAEDIC SURGERY

## 2024-12-18 PROCEDURE — 81025 URINE PREGNANCY TEST: CPT | Performed by: ORTHOPAEDIC SURGERY

## 2024-12-18 PROCEDURE — A6213 FOAM DRG >16<=48 SQ IN W/BDR: HCPCS | Performed by: ORTHOPAEDIC SURGERY

## 2024-12-18 PROCEDURE — 7100000002 HC RECOVERY ROOM TIME - EACH INCREMENTAL 1 MINUTE: Performed by: ORTHOPAEDIC SURGERY

## 2024-12-18 PROCEDURE — 29884 ARTHRS KNEE SURG LYSIS ADS: CPT | Performed by: PHYSICIAN ASSISTANT

## 2024-12-18 PROCEDURE — 29884 ARTHRS KNEE SURG LYSIS ADS: CPT | Performed by: ORTHOPAEDIC SURGERY

## 2024-12-18 PROCEDURE — 36415 COLL VENOUS BLD VENIPUNCTURE: CPT | Performed by: PHYSICIAN ASSISTANT

## 2024-12-18 PROCEDURE — 2500000001 HC RX 250 WO HCPCS SELF ADMINISTERED DRUGS (ALT 637 FOR MEDICARE OP): Performed by: ANESTHESIOLOGY

## 2024-12-18 PROCEDURE — 3600000009 HC OR TIME - EACH INCREMENTAL 1 MINUTE - PROCEDURE LEVEL FOUR: Performed by: ORTHOPAEDIC SURGERY

## 2024-12-18 PROCEDURE — 2720000007 HC OR 272 NO HCPCS: Performed by: ORTHOPAEDIC SURGERY

## 2024-12-18 PROCEDURE — 27422 REVISION OF UNSTABLE KNEECAP: CPT | Performed by: PHYSICIAN ASSISTANT

## 2024-12-18 PROCEDURE — 3700000001 HC GENERAL ANESTHESIA TIME - INITIAL BASE CHARGE: Performed by: ORTHOPAEDIC SURGERY

## 2024-12-18 RX ORDER — HYDRALAZINE HYDROCHLORIDE 20 MG/ML
5 INJECTION INTRAMUSCULAR; INTRAVENOUS EVERY 30 MIN PRN
Status: DISCONTINUED | OUTPATIENT
Start: 2024-12-18 | End: 2024-12-18 | Stop reason: HOSPADM

## 2024-12-18 RX ORDER — OXYCODONE AND ACETAMINOPHEN 5; 325 MG/1; MG/1
1 TABLET ORAL EVERY 4 HOURS PRN
Status: DISCONTINUED | OUTPATIENT
Start: 2024-12-18 | End: 2024-12-18 | Stop reason: HOSPADM

## 2024-12-18 RX ORDER — DIPHENHYDRAMINE HYDROCHLORIDE 50 MG/ML
12.5 INJECTION, SOLUTION INTRAMUSCULAR; INTRAVENOUS ONCE AS NEEDED
Status: DISCONTINUED | OUTPATIENT
Start: 2024-12-18 | End: 2024-12-18 | Stop reason: HOSPADM

## 2024-12-18 RX ORDER — ACETAMINOPHEN 325 MG/1
975 TABLET ORAL ONCE
Status: DISCONTINUED | OUTPATIENT
Start: 2024-12-18 | End: 2024-12-18 | Stop reason: HOSPADM

## 2024-12-18 RX ORDER — LIDOCAINE HYDROCHLORIDE 10 MG/ML
0.1 INJECTION, SOLUTION INFILTRATION; PERINEURAL ONCE
Status: DISCONTINUED | OUTPATIENT
Start: 2024-12-18 | End: 2024-12-18 | Stop reason: HOSPADM

## 2024-12-18 RX ORDER — SODIUM CHLORIDE, SODIUM LACTATE, POTASSIUM CHLORIDE, CALCIUM CHLORIDE 600; 310; 30; 20 MG/100ML; MG/100ML; MG/100ML; MG/100ML
100 INJECTION, SOLUTION INTRAVENOUS CONTINUOUS
Status: DISCONTINUED | OUTPATIENT
Start: 2024-12-18 | End: 2024-12-18 | Stop reason: HOSPADM

## 2024-12-18 RX ORDER — PHENYLEPHRINE HCL IN 0.9% NACL 1 MG/10 ML
SYRINGE (ML) INTRAVENOUS AS NEEDED
Status: DISCONTINUED | OUTPATIENT
Start: 2024-12-18 | End: 2024-12-18

## 2024-12-18 RX ORDER — OXYCODONE HYDROCHLORIDE 5 MG/1
5 TABLET ORAL EVERY 4 HOURS PRN
Status: DISCONTINUED | OUTPATIENT
Start: 2024-12-18 | End: 2024-12-18 | Stop reason: HOSPADM

## 2024-12-18 RX ORDER — HYDROMORPHONE HYDROCHLORIDE 0.2 MG/ML
0.2 INJECTION INTRAMUSCULAR; INTRAVENOUS; SUBCUTANEOUS EVERY 5 MIN PRN
Status: DISCONTINUED | OUTPATIENT
Start: 2024-12-18 | End: 2024-12-18 | Stop reason: HOSPADM

## 2024-12-18 RX ORDER — CLINDAMYCIN PHOSPHATE 600 MG/50ML
600 INJECTION, SOLUTION INTRAVENOUS ONCE
Status: COMPLETED | OUTPATIENT
Start: 2024-12-18 | End: 2024-12-18

## 2024-12-18 RX ORDER — ALBUTEROL SULFATE 0.83 MG/ML
2.5 SOLUTION RESPIRATORY (INHALATION) ONCE AS NEEDED
Status: DISCONTINUED | OUTPATIENT
Start: 2024-12-18 | End: 2024-12-18 | Stop reason: HOSPADM

## 2024-12-18 RX ORDER — PROPOFOL 10 MG/ML
INJECTION, EMULSION INTRAVENOUS AS NEEDED
Status: DISCONTINUED | OUTPATIENT
Start: 2024-12-18 | End: 2024-12-18

## 2024-12-18 RX ORDER — LIDOCAINE HYDROCHLORIDE 20 MG/ML
INJECTION, SOLUTION EPIDURAL; INFILTRATION; INTRACAUDAL; PERINEURAL AS NEEDED
Status: DISCONTINUED | OUTPATIENT
Start: 2024-12-18 | End: 2024-12-18

## 2024-12-18 RX ORDER — FENTANYL CITRATE 50 UG/ML
INJECTION, SOLUTION INTRAMUSCULAR; INTRAVENOUS AS NEEDED
Status: DISCONTINUED | OUTPATIENT
Start: 2024-12-18 | End: 2024-12-18

## 2024-12-18 RX ORDER — ONDANSETRON HYDROCHLORIDE 2 MG/ML
4 INJECTION, SOLUTION INTRAVENOUS ONCE AS NEEDED
Status: DISCONTINUED | OUTPATIENT
Start: 2024-12-18 | End: 2024-12-18 | Stop reason: HOSPADM

## 2024-12-18 RX ORDER — MIDAZOLAM HYDROCHLORIDE 1 MG/ML
INJECTION, SOLUTION INTRAMUSCULAR; INTRAVENOUS AS NEEDED
Status: DISCONTINUED | OUTPATIENT
Start: 2024-12-18 | End: 2024-12-18

## 2024-12-18 RX ORDER — ONDANSETRON HYDROCHLORIDE 2 MG/ML
INJECTION, SOLUTION INTRAVENOUS AS NEEDED
Status: DISCONTINUED | OUTPATIENT
Start: 2024-12-18 | End: 2024-12-18

## 2024-12-18 RX ORDER — LABETALOL HYDROCHLORIDE 5 MG/ML
5 INJECTION, SOLUTION INTRAVENOUS ONCE AS NEEDED
Status: DISCONTINUED | OUTPATIENT
Start: 2024-12-18 | End: 2024-12-18 | Stop reason: HOSPADM

## 2024-12-18 RX ADMIN — OXYCODONE HYDROCHLORIDE AND ACETAMINOPHEN 1 TABLET: 5; 325 TABLET ORAL at 16:14

## 2024-12-18 RX ADMIN — POVIDONE-IODINE: 5 SOLUTION TOPICAL at 11:35

## 2024-12-18 SDOH — HEALTH STABILITY: MENTAL HEALTH: CURRENT SMOKER: 0

## 2024-12-18 ASSESSMENT — COLUMBIA-SUICIDE SEVERITY RATING SCALE - C-SSRS
6. HAVE YOU EVER DONE ANYTHING, STARTED TO DO ANYTHING, OR PREPARED TO DO ANYTHING TO END YOUR LIFE?: NO
2. HAVE YOU ACTUALLY HAD ANY THOUGHTS OF KILLING YOURSELF?: NO
1. IN THE PAST MONTH, HAVE YOU WISHED YOU WERE DEAD OR WISHED YOU COULD GO TO SLEEP AND NOT WAKE UP?: NO

## 2024-12-18 ASSESSMENT — PAIN SCALES - GENERAL
PAINLEVEL_OUTOF10: 0 - NO PAIN
PAINLEVEL_OUTOF10: 0 - NO PAIN
PAINLEVEL_OUTOF10: 7
PAINLEVEL_OUTOF10: 3

## 2024-12-18 ASSESSMENT — PAIN - FUNCTIONAL ASSESSMENT
PAIN_FUNCTIONAL_ASSESSMENT: 0-10

## 2024-12-18 NOTE — OP NOTE
Arthroscopy Knee with Open Patellafemeral Ligament Repair/ Advancement (L) Operative Note     Date: 2024  OR Location: [facility]    Name: Susan Ford, : 1980, Age: 44 y.o., MRN: [id number], Sex: female    Diagnosis  Pre-op Diagnosis      * Patellofemoral disorders, left knee [M22.2X2] Post-op Diagnosis     * Patellofemoral disorders, left knee [M22.2X2]     Procedures  Arthroscopy Knee with Open Patellafemeral Ligament Repair/ Advancement  74321 - NM ARTHROSCOPY KNEE DIAGNOSTIC W/WO SYNOVIAL BX SPX  NM LATERAL RETINACULAR RELEASE OPEN [[id number]]  1.  Left knee diagnostic arthroscopy with limited synovectomy and removal of ACL cyst/cyclops lesion  2.  Left knee open proximal patellofemoral realignment with VMO advancement/imbrication and lateral retinacular repair      Surgeons      * Hayden Goldman - Primary    Resident/Fellow/Other Assistant:  Surgeons and Role:  * No surgeons found with a matching role *Alyssa Murphy PA-C    Staff:   Surgical Assistant:   Radha: Sandra  Circulator: Vasyl  Scrub Person: Lubna  Scrub Person: Jose    Anesthesia Staff: Anesthesiologist: Alverto De La Rosa MD  C-AA: MICHEAL Salcedo    Procedure Summary  Anesthesia: General  ASA: III  Estimated Blood Loss: 50 mL  Intra-op Medications:   Administrations occurring from 1230 to 1435 on 24:   Medication Name Total Dose   dexAMETHasone (Decadron) injection 4 mg/mL 4 mg   fentaNYL (Sublimaze) injection 50 mcg/mL 50 mcg   LR bolus Cannot be calculated   lidocaine PF (Xylocaine-MPF) local injection 2 % 100 mg   midazolam (Versed) injection 1 mg/mL 2 mg   ondansetron (Zofran) 2 mg/mL injection 4 mg   phenylephrine 100 mcg/mL syringe 10 mL (prefilled) 200 mcg   propofol (Diprivan) injection 10 mg/mL 200 mg   clindamycin (Cleocin) 600 mg in dextrose 5% IV 50 mL 600 mg              Anesthesia Record               Intraprocedure I/O Totals       None           Specimen: No specimens collected              Drains and/or  Catheters: * None in log *    Tourniquet Times:     Total Tourniquet Time Documented:  Thigh (laterality) - 45 minutes  Total: Thigh (laterality) - 45 minutes    Indications: 44-year-old female with recurrent patellofemoral pain mechanical symptoms instability elected proceed with surgery for left knee arthroscopy and open patellofemoral ligament repair/realignment    Risks benefits and alternatives to surgery were discussed including but not limited to Infection, bleeding, neurovascular injury, pain and dysfunction, hardware related complications including cutout failure breakage, loss of function, motion, and permanent disability as well as the cardiovascular and pulmonary complications from anesthesia including death and DVT. Patient and family accept these risks.We discussed specifically hardware related complications including cut out, failure, breakage, stiffness, loss and function, motion, recurrent instability and the need for revision surgeries    Patient identified in the preop area.  Left lower extremity marked and confirmed with patient as the operative site.  Brought back to the operating room and anesthetized under general anesthesia.  Lower extremity was then prepped and draped in standard sterile fashion.  Patient, site and procedure were confirmed with timeout.  Everyone in the room agreed.  Preop antibiotics were given.Patient given a regional nerve block.    Patient a large a curvilinear incision from her prior surgery.  This incision was used and the tourniquet was inflated.  We dissected the skin fat down to the fascia.  Great a large skin flaps both medially and laterally.  On the stability exam patient had increased lateral glide and some upswing to the lateral facet on preoperative and intraoperative exam.  It.  She had prior open lateral lengthening with significant scar which is along the lateral retinaculum.  We then made standard medial and lateral arthroscopy portals, scope was  introduced.     Medial joint space: Normal medial joint space good cartilage no meniscus tear  Lateral joint space: Normal lateral joint space good cartilage no meniscus tear  Patellofemoral joint: Patient fairly healthy cartilage in the patellofemoral joint.  She did have slight medialization of the patella in regards to tilt did engage fairly centrally in the trochlea and deep flexion but did have increased lateral foot and full knee extension.  Patient significant suprapatellar pouch adhesions were resected  The patient had small ACL cyst and small cyclops lesion notable in full extension with little bit of a bone underneath he is icy bur and shaver to resect this area down to a stable base we did not impinge in extension    This concluded our arthroscopic procedure    We then proceeded with identifying her prior lateral retinacular release.  We identified the IT band and the fascia laterally as well as what appeared to be incision open the lateral vastus muscle.  There is a notable interval of scar which is approximately a centimeter in width.  We dissected into we able to identify our normal tissue planes.  We then placed a single stitch to confirm isometry for the intended lateral retinacular repair.  Knee was flexed and extended the knee had dramatic improved stability with lateral based stress at both 0 and 30 degrees.  This confirmed the location of our repair.  We then excised the interval scar which is remaining in the gap.  We placed #1 Vicryl sutures in lateral vastus muscle tendon and continue to repair lateral retinaculum and did a proximal patellofemoral realignment.  This dramatically proved the overall stability.  The knee was flexed to 120.  We then oversewed advanced the VMO on the medial side has had some small area deficiencies from the prior repair.  This improved the overall soft tissue tensioning on the medial imbrication of the sutures.  We then reduced introduced the scope to confirm  isometry of the patellofemoral joint.  Overall satisfied with the improved stability and lateralized position of the patella in extension and flexion.  Remainder of the wounds were then closed with standard layered fashion and Monocryl in the skin.  Sterile dressings applied.  Patient woke from anesthesia to the PACU stable condition.    Alyssa Murphy PA-C was present throughout the entire case. Given the nature of the disease process and the procedure, a skilled surgical first assistant was necessary during the case. The assistant was necessary to hold retractors and to manipulate the extremity during the procedure. A certified scrub tech was at the back table managing the instruments and supplies for the surgical case.

## 2024-12-18 NOTE — ANESTHESIA PROCEDURE NOTES
Peripheral Block    Start time: 12/18/2024 12:06 PM  End time: 12/18/2024 12:11 PM  Reason for block: at surgeon's request and post-op pain management  Staffing  Performed: attending   Authorized by: Alverto De La Rosa MD    Performed by: Alverto De La Rosa MD  Preanesthetic Checklist  Completed: patient identified, IV checked, site marked, risks and benefits discussed, surgical consent, monitors and equipment checked, pre-op evaluation and timeout performed   Timeout performed at: 12/18/2024 12:10 PM  Peripheral Block  Patient position: laying flat  Prep: ChloraPrep  Block type: adductor canal  Laterality: left  Injection technique: single-shot  Local infiltration: bupivicaine  Infiltration strength: 0.5 %  Dose: 25 mL  Needle  Needle type: short-bevel   Needle localization: ultrasound guidance     image stored in chart  Assessment  Injection assessment: negative aspiration for heme, no paresthesia on injection, incremental injection and local visualized surrounding nerve on ultrasound

## 2024-12-18 NOTE — H&P
Chief Complaint   Patient presents with    Left Knee - New Patient Visit, Pain       Discuss Options  Xray today      History of Present Illness:  11/08/24: Susan Ford is a 44 y.o. female presenting to clinic as an established patient with complaints of left knee pain. She is having significant weakness and pain in her knee. She had a fall recently due to the weakness. She has recurrent mechanical symptoms in her kneecap with popping. She had a left knee diagnostic arthroscopy followed by open knee anteromedial tibial tubercle transfer, patellofemoral ligament reconstruction, medial retinacular repair, lateral retinacular lengthening, and anterior compartment fasciotomy with Dr Walter 07/22/14. She then had a left knee arthroscopic debridement of scar tissue and open hardware removal of tibial screws with Dr. Walter on 07/21/15.     05/05/23: Patient returns today. Unfortunately the knee is not doing well. She has a mechanical history of catching and pain complaints. She had an injury to the right knee two weeks ago. She states she fell and felt something pop and buckle on the knee and it has been recurrently painful and unstable. She is limping and having pain. She states she feels like something is stuck and it has been swelling. She has been taking ibuprofen routinely and icing it. We have seen her in the past for her left knee recurrent patellofemoral instability. Well established care.      Imaging:  X-rays left knee: No acute fractures or dislocations.  No arthritic change and well-preserved joint space     Assessment:   Left knee recurrent patellofemoral instability status post prior surgery     Plan:  At this point, we discussed surgical options, specifically I recommended left knee diagnostic arthroscopy with open lateral release repair, possible embrication as well as fascia repair.    Risks, benefits, and alternatives to surgery were discussed including, but not limited to, infection,  bleeding, neurovascular injury, persistent pain and dysfunction.  We discussed specifically incomplete pain relief posttraumatic arthritis and stiffness, loss of function and motion. We discussed with her specifically this may be a staged procedure if she has recurrent instability or other cartilage disease, which is notable during the time of diagnostic arthroscopy.  We discussed with her specifically this may be incomplete pain relief, but hopefully we can improve some of her stability and knee cap complaints.    We will plan for postop pain relief.  [place] has been reviewed for Susan Ford and is consistent with prescribed medications. This report is scanned into the electronic medical record. The risks of abuse, dependence, addiction and diversion were considered.  The medication is felt to be clinically appropriate based on documented diagnosis and for her current pain leve        Physical Exam:  Well-nourished, well-developed. No acute distress. Alert and oriented x3. Responds appropriately to questioning. Good mood. Normal affect.  Physical Exam  Left Knee:  ROM: 120.  Trace effusion.  Well-healed surgical scars.  Some prominence to her anterior tibia from prior distal fasciotomy.  Positive Claire´s test/Positive Apley Grind  Neurovascular exam normal distally  Palpable pedal pulse and good cap refill     Review of Systems:  GENERAL: Negative for malaise, significant weight loss, fever  MUSCULOSKELETAL: See HPI  NEURO:  Negative      Medical History        Past Medical History:   Diagnosis Date    Asthma      Cervicalgia      Chronic pain syndrome      Frequent falls      GERD (gastroesophageal reflux disease)      Knee pain, right      Snoring       Snoring    UTI (urinary tract infection)              Medication Documentation Review Audit         Reviewed by Lamonte Camacho RN (Registered Nurse) on 10/25/24 at 1109       Medication Order Taking? Sig Documenting Provider Last Dose Status   albuterol  (Ventolin HFA) 90 mcg/actuation inhaler [id number] No Inhale 1 puff every 4 hours if needed for wheezing (inhale 1-2 puffs every 4 hours as needed.). Dora Zamora MD Taking  10/16/24 5429   albuterol 2.5 mg /3 mL (0.083 %) nebulizer solution [id number] No Take 3 mL (2.5 mg) by nebulization every 6 hours if needed for wheezing. Historical Provider, MD Taking Active   benzonatate (Tessalon) 100 mg capsule [id number] No Take 1 capsule (100 mg) by mouth 3 times a day as needed for cough. Do not crush or chew. Historical Provider, MD Taking Active   Patient not taking:  Discontinued 10/23/24 2333   cyclobenzaprine (Flexeril) 10 mg tablet [id number]   Take 1 tablet (10 mg) by mouth if needed for muscle spasms. Enedina Hernandez MD   Active   dupilumab (Dupixent Pen) 300 mg/2 mL injection [id number] No Inject 2 mL (300 mg) under the skin every 14 (fourteen) days. Historical Provider, MD Taking Active   EPINEPHrine (EpiPen 2-Milan) 0.3 mg/0.3 mL injection syringe [id number] No as directed Injection as directed for 1 days Historical Provider, MD Taking Active   fexofenadine (Allegra) 180 mg tablet [id number] No   Historical Provider, MD Taking Active   fluticasone (Flonase) 50 mcg/actuation nasal spray [id number] No Administer 1 spray into each nostril once daily. Shake gently. Before first use, prime pump. After use, clean tip and replace cap. Historical Provider, MD Taking Active   fluticasone-umeclidin-vilanter (Trelegy Ellipta) 200-62.5-25 mcg blister with device [id number] No Inhale 1 puff once daily. Historical Provider, MD Taking Active   folic acid (Folvite) 1 mg tablet [id number] No Take 1 tablet (1 mg) by mouth once daily. Enedina Hernandez MD Taking Active   lidocaine (Lidoderm) 5 % patch [id number]   Place 1 patch over 12 hours on the skin once daily. Remove & discard patch within 12 hours or as directed by MD. Enedina Hernandez MD   Active   meclizine (Antivert) 25 mg tablet [id number]   Take 1  tablet (25 mg) by mouth 3 times a day as needed for dizziness. Enedina Hernandez MD   Active   mometasone-formoterol (Dulera) 200-5 mcg/actuation inhaler [id number] No Inhale 2 puffs twice a day. Historical Provider, MD Taking Active   naloxone (Narcan) 4 mg/0.1 mL nasal spray [id number] No Administer 1 spray (4 mg) into affected nostril(s) if needed for opioid reversal. May repeat in 2-3 minutes if needed, alternating nostrils Enedina Hernandez MD Taking Active   nebulizer accessories misc [id number] No Use as directed with nebulizer machine. Dora Zamora MD Taking Active   oxyCODONE-acetaminophen (Percocet)  mg tablet [id number]   Take 1 tablet by mouth every 4 hours if needed for severe pain (7 - 10). Enedina Hernandez MD   Active   polyethylene glycol (Glycolax, Miralax) 17 gram packet [id number]   Take 17 g by mouth once daily for 3 days. Enedina Hernandez MD    10/19/24 2359   predniSONE (Deltasone) 10 mg tablet [id number]   Take 1 tablet (10 mg) by mouth once daily. Enedina Hernandez MD   Active   sulfaSALAzine (Azulfidine) 500 mg DR tablet [id number]   Take 1 tablet (500 mg) by mouth 2 times a day. Do not crush, chew, or split. Enedina Hernandez MD   Active   traMADol (Ultram) 50 mg tablet [id number]   Take 1 tablet (50 mg) by mouth 2 times a day. Enedina Hernandez MD   Active   Discontinued 10/23/24 2334                          RX Allergies         Allergies   Allergen Reactions    Penicillin Shortness of breath    Penicillins Shortness of breath, Anaphylaxis and Swelling       Reaction was when patient was a child. Pts mother told her about it.    Bee Pollen Unknown    House Dust Unknown            Social History               Socioeconomic History    Marital status:        Spouse name: woodrow    Number of children: 4    Years of education: Not on file    Highest education level: High school graduate   Occupational History    Occupation: customer  service   Tobacco Use    Smoking status: Never    Smokeless tobacco: Never   Vaping Use    Vaping status: Never Used   Substance and Sexual Activity    Alcohol use: Not Currently    Drug use: Never    Sexual activity: Yes   Other Topics Concern    Not on file   Social History Narrative    Not on file      Social Drivers of Health           Financial Resource Strain: Not on file   Food Insecurity: Unknown (12/9/2023)     [facility]     Hunger Vital Sign      Worried About Running Out of Food in the Last Year: Never true      Ran Out of Food in the Last Year: Not on file   Transportation Needs: Not on file   Physical Activity: Not on file   Stress: Not on file   Social Connections: Not on file   Intimate Partner Violence: Not on file   Housing Stability: Not on file            Surgical History         Past Surgical History:   Procedure Laterality Date    APPENDECTOMY   11/21/2013     Appendectomy    HERNIA REPAIR   11/21/2013     Hernia Repair    PATELLAR TENDON REPAIR Left      TUBAL LIGATION   11/21/2013     Tubal Ligation    WISDOM TOOTH EXTRACTION                No results found.         Scribe Attestation  By signing my name below, ILeela, Scriblex   attest that this documentation has been prepared under the direction and in the presence of Hayden Goldman MD.           Electronically signed by Hayden Goldman MD at 11/8/2024  1:15 PM  Electronically signed by Hayden Goldman MD at 12/3/2024  4:43 PM     Instructions    AVS - Outpatient (Automatic SnapShot taken 11/8/2024)  Additional Documentation    Encounter Info: Billing Info,     History,     Allergies,     Developmental     Communications    View All Conversations on this Encounter  Media  From this encounter  Consent-General - Electronic signature on 11/8/2024 9:48 AM - E-signed     Travel Screening    Question 11/7/2024  9:00 AM EST - Filed by Patient 10/25/2024 10:48 AM EST - Filed by Patient 8/30/2024  7:39 AM  EST - Filed by Patient   Do you have any of the following new or worsening symptoms? None of these Fatigue None of these     Joint pain      Muscle pain      Shortness of breath    Have you recently been in contact with someone who was sick? No / Unsure No / Unsure No / Unsure     Orders Placed      XR knee left 4+ views    Crutches  Medication Changes      None  Medication List  Visit Diagnoses      Acute medial meniscus tear, left, initial encounter    Left knee pain, unspecified chronicity  Problem List  Encounters with Related Results    Hospital Encounter (11/8/2024)

## 2024-12-18 NOTE — ANESTHESIA PROCEDURE NOTES
Airway  Date/Time: 12/18/2024 1:25 PM  Urgency: elective      Staffing  Performed: MICHEAL   Authorized by: Alverto De La Rosa MD    Performed by: MICHEAL Salcedo  Patient location during procedure: OR    Indications and Patient Condition  Indications for airway management: anesthesia  Spontaneous Ventilation: absent  Sedation level: deep  Preoxygenated: yes  Mask difficulty assessment: 1 - vent by mask    Final Airway Details  Final airway type: supraglottic airway      Successful airway: Size 4     Number of attempts at approach: 1

## 2024-12-18 NOTE — ANESTHESIA PREPROCEDURE EVALUATION
Patient: Susan Ford    Procedure Information       Date/Time: 12/18/24 1230    Procedure: Arthroscopy Knee with Open Patellafemeral Ligament Repair/ Advancement (Left: Knee)    Location: [facility] OR 06 / [facility] OR    Surgeons: Hayden Goldman MD            Relevant Problems   Pulmonary   (+) Asthma      Neuro   (+) Anxiety with depression   (+) Cubital tunnel syndrome on left   (+) Lumbago with sciatica, left side      GI   (+) Abnormal swallowing   (+) Irritable bowel syndrome      Liver   (+) Autoimmune hepatitis (Multi)      Musculoskeletal   (+) Chronic pain syndrome   (+) Osteoarthritis of left knee      HEENT   (+) Seasonal allergies       Clinical information reviewed:   Tobacco  Allergies  Meds   Med Hx  Surg Hx  OB Status  Fam Hx  Soc   Hx        NPO Detail:  NPO/Void Status  Carbohydrate Drink Given Prior to Surgery? : N  Date of Last Liquid: 12/18/24  Time of Last Liquid: 0800  Date of Last Solid: 12/17/24  Time of Last Solid: 1800  Last Intake Type: Clear fluids  Time of Last Void: 1103         Physical Exam    Airway  Mallampati: II  TM distance: >3 FB  Neck ROM: full     Cardiovascular   Rhythm: regular  Rate: normal     Dental - normal exam     Pulmonary   Breath sounds clear to auscultation     Abdominal            Anesthesia Plan    History of general anesthesia?: yes  History of complications of general anesthesia?: no    ASA 3     general     The patient is not a current smoker.    intravenous induction   Postoperative administration of opioids is intended.  Anesthetic plan and risks discussed with patient.    Plan discussed with CAA.

## 2024-12-18 NOTE — ANESTHESIA POSTPROCEDURE EVALUATION
Patient: Susan Ford    Procedure Summary       Date: 12/18/24 Room / Location: [facility] 06 / [facility]    Anesthesia Start: 1317 Anesthesia Stop: 1500    Procedure: Arthroscopy Knee with Open Patellafemeral Ligament Repair/ Advancement (Left: Knee) Diagnosis:       Patellofemoral disorders, left knee      (patellofemoral disorders, left knee)    Surgeons: Hayden Goldman MD Responsible Provider: Alverto De La Rosa MD    Anesthesia Type: general ASA Status: 3            Anesthesia Type: general    Vitals Value Taken Time   /72 12/18/24 1515   Temp 36.8 °C (98.2 °F) 12/18/24 1500   Pulse 56 12/18/24 1517   Resp 27 12/18/24 1517   SpO2 100 % 12/18/24 1517   Vitals shown include unfiled device data.    Anesthesia Post Evaluation    Patient location during evaluation: PACU  Patient participation: complete - patient participated  Level of consciousness: awake and alert  Pain management: satisfactory to patient  Multimodal analgesia pain management approach  Airway patency: patent  Cardiovascular status: acceptable  Respiratory status: acceptable  Hydration status: acceptable  Postoperative Nausea and Vomiting: none        No notable events documented.

## 2024-12-19 NOTE — PROGRESS NOTES
History of Present Illness:  Date of Surgery: 12/18/24 diagnostic left knee arthroscopy with limited synovectomy and removal of ACL/cyclops lesion and open proximal patellofemoral realignment with VMO advancement. She is doing well. She is having normal post-operative pain and soreness. She is already feeling better with stability in the knee.     Exam:  Mild effusion  Incision is clean, dry, intact, and healing well  Moderate bruising  No calf tenderness   Negative Geraldo's test  Distal neurovascular exam intact    Assessment:  S/p diagnostic left knee arthroscopy with limited synovectomy and removal of ACL/cyclops lesion and open proximal patellofemoral realignment with VMO advancement    Plan:  Formal physical therapy on MPFL protocol. 0-90 ° for the next 2 weeks then progress  Percocet for acute pain relief  She will remain out of work until follow-up.   Follow-up in 3 weeks     Scribe Attestation  By signing my name below, Leela MAHMOOD Scribe   attest that this documentation has been prepared under the direction and in the presence of Hayden Goldman MD.

## 2024-12-20 ENCOUNTER — PATIENT OUTREACH (OUTPATIENT)
Dept: PRIMARY CARE | Facility: CLINIC | Age: 44
End: 2024-12-20
Payer: COMMERCIAL

## 2024-12-20 DIAGNOSIS — M06.09 RHEUMATOID ARTHRITIS OF MULTIPLE SITES WITHOUT RHEUMATOID FACTOR (MULTI): ICD-10-CM

## 2024-12-20 DIAGNOSIS — M19.90 OSTEOARTHRITIS, UNSPECIFIED OSTEOARTHRITIS TYPE, UNSPECIFIED SITE: ICD-10-CM

## 2024-12-20 NOTE — PROGRESS NOTES
Monthly outreach to patient.  She is 2 days post op arthroscopic knee ligament and ACL repair.  She is doing very well, managing pain with scheduled ice packs and Percocet 10 mg but may run out before refill due.  These are prescribed by Dr. Garcia, but ortho only ordered 5 mg which was unable to be filled but also most likely not going to be effective for her.  She has follow up with ortho day after Dana and will find out when therapy can be started and if different kind of brace may be needed.  She sees surgeon for elbow 1/6/25 and hopes to move forward soon with that surgery although feels need for Symponi infusion which delays any surgical procedures for at least a month.  She has good family support in place and remains on STD with work.  Hopefully everything will be repaired and once on monthly Symponi, will have endurance to continue working.  No other concerns voiced this call

## 2024-12-26 ENCOUNTER — OFFICE VISIT (OUTPATIENT)
Dept: ORTHOPEDIC SURGERY | Facility: CLINIC | Age: 44
End: 2024-12-26
Payer: COMMERCIAL

## 2024-12-26 ENCOUNTER — APPOINTMENT (OUTPATIENT)
Dept: ORTHOPEDIC SURGERY | Facility: CLINIC | Age: 44
End: 2024-12-26
Payer: COMMERCIAL

## 2024-12-26 DIAGNOSIS — S83.242A ACUTE MEDIAL MENISCUS TEAR, LEFT, INITIAL ENCOUNTER: ICD-10-CM

## 2024-12-26 PROCEDURE — 99024 POSTOP FOLLOW-UP VISIT: CPT | Performed by: ORTHOPAEDIC SURGERY

## 2024-12-26 PROCEDURE — 1036F TOBACCO NON-USER: CPT | Performed by: ORTHOPAEDIC SURGERY

## 2024-12-26 PROCEDURE — L1812 KO ELASTIC W/JOINTS PRE OTS: HCPCS | Performed by: ORTHOPAEDIC SURGERY

## 2024-12-26 PROCEDURE — 99211 OFF/OP EST MAY X REQ PHY/QHP: CPT | Performed by: ORTHOPAEDIC SURGERY

## 2024-12-26 RX ORDER — OXYCODONE AND ACETAMINOPHEN 5; 325 MG/1; MG/1
1 TABLET ORAL EVERY 6 HOURS PRN
Qty: 20 TABLET | Refills: 0 | Status: SHIPPED | OUTPATIENT
Start: 2024-12-26 | End: 2025-01-02

## 2025-01-03 ENCOUNTER — APPOINTMENT (OUTPATIENT)
Dept: INFUSION THERAPY | Facility: CLINIC | Age: 45
End: 2025-01-03
Payer: COMMERCIAL

## 2025-01-03 VITALS
RESPIRATION RATE: 18 BRPM | DIASTOLIC BLOOD PRESSURE: 70 MMHG | SYSTOLIC BLOOD PRESSURE: 105 MMHG | WEIGHT: 174.2 LBS | OXYGEN SATURATION: 98 % | BODY MASS INDEX: 29.9 KG/M2 | HEART RATE: 60 BPM | TEMPERATURE: 98.7 F

## 2025-01-03 DIAGNOSIS — M06.09 POLYARTHRITIS WITH NEGATIVE RHEUMATOID FACTOR (MULTI): ICD-10-CM

## 2025-01-03 RX ORDER — FAMOTIDINE 10 MG/ML
20 INJECTION INTRAVENOUS ONCE AS NEEDED
OUTPATIENT
Start: 2025-01-22

## 2025-01-03 RX ORDER — EPINEPHRINE 0.3 MG/.3ML
0.3 INJECTION SUBCUTANEOUS EVERY 5 MIN PRN
OUTPATIENT
Start: 2025-01-22

## 2025-01-03 RX ORDER — KETOROLAC TROMETHAMINE 30 MG/ML
30 INJECTION, SOLUTION INTRAMUSCULAR; INTRAVENOUS ONCE
OUTPATIENT
Start: 2025-01-22 | End: 2025-01-22

## 2025-01-03 RX ORDER — DIPHENHYDRAMINE HYDROCHLORIDE 50 MG/ML
50 INJECTION INTRAMUSCULAR; INTRAVENOUS AS NEEDED
OUTPATIENT
Start: 2025-01-22

## 2025-01-03 RX ORDER — ALBUTEROL SULFATE 0.83 MG/ML
3 SOLUTION RESPIRATORY (INHALATION) AS NEEDED
OUTPATIENT
Start: 2025-01-22

## 2025-01-03 RX ORDER — KETOROLAC TROMETHAMINE 30 MG/ML
30 INJECTION, SOLUTION INTRAMUSCULAR; INTRAVENOUS ONCE
Status: COMPLETED | OUTPATIENT
Start: 2025-01-03 | End: 2025-01-03

## 2025-01-03 RX ADMIN — KETOROLAC TROMETHAMINE 30 MG: 30 INJECTION, SOLUTION INTRAMUSCULAR; INTRAVENOUS at 13:47

## 2025-01-03 ASSESSMENT — ENCOUNTER SYMPTOMS
DIZZINESS: 0
NAUSEA: 0
DIARRHEA: 0
BLOOD IN STOOL: 0
WOUND: 0
CHILLS: 0
APPETITE CHANGE: 0
TROUBLE SWALLOWING: 0
ABDOMINAL PAIN: 0
CONSTIPATION: 0
HEADACHES: 0
LIGHT-HEADEDNESS: 0
DYSURIA: 0
VOMITING: 0
SORE THROAT: 0
COUGH: 0
SHORTNESS OF BREATH: 0
ARTHRALGIAS: 1
FREQUENCY: 0
MYALGIAS: 0
FEVER: 0
VOICE CHANGE: 0
HEMATURIA: 0
NUMBNESS: 0
LEG SWELLING: 0
UNEXPECTED WEIGHT CHANGE: 0
FATIGUE: 0
EYE PROBLEMS: 0
WHEEZING: 0
PALPITATIONS: 0
EXTREMITY WEAKNESS: 0

## 2025-01-03 ASSESSMENT — PAIN SCALES - GENERAL: PAINLEVEL_OUTOF10: 5

## 2025-01-03 NOTE — PATIENT INSTRUCTIONS
Today :We administered methylPREDNISolone sod succinate, ketorolac, and golimumab (Simponi Aria) 150 mg in sodium chloride 0.9% 100 mL IV.     For:   1. 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     (Tell all doctors including dentists that you are taking this medication)     Go to the emergency room or call 911 if:  -You have signs of allergic reaction:   -Rash, hives, itching.   -Swollen, blistered, peeling skin.   -Swelling of face, lips, mouth, tongue or throat.   -Tightness of chest, trouble breathing, swallowing or talking     Call your doctor:  - If IV / injection site gets red, warm, swollen, itchy or leaks fluid or pus.     (Leave dressing on your IV site for at least 2 hours and keep area clean and dry  - If you get sick or have symptoms of infection or are not feeling well for any reason.    (Wash your hands often, stay away from people who are sick)  - If you have side effects from your medication that do not go away or are bothersome.     (Refer to the teaching your nurse gave you for side effects to call your doctor about)    - Common side effects may include:  stuffy nose, headache, feeling tired, muscle aches, upset stomach  - Before receiving any vaccines     - Call the [facility] at   If:  - You get sick, are on antibiotics, have had a recent vaccine, have surgery or dental work and your doctor wants your visit rescheduled.  - You need to cancel and reschedule your visit for any reason. Call at least 2 days before your visit if you need to cancel.   - Your insurance changes before your next visit.    (We will need to get approval from your new insurance. This can take up to two weeks.)     The [facility] is opened Monday thru Friday. We are closed on weekends and holidays.   Voice mail will take your call if  the center is closed. If you leave a message please allow 24 hours for a call back during weekdays. If you leave a message on a weekend/holiday, we will call you back the next business day.    A pharmacist is available Monday - Friday from 8:30AM to 3:30PM to help answer any questions you may have about your prescriptions(s). Please call pharmacy at:    [facility]: [phone]  [facility]: [phone]  [facility]: [phone]

## 2025-01-03 NOTE — PROGRESS NOTES
[facility]   Infusion Clinic Note   Date: January 3, 2025   Name: Susan Ford  : 1980   MRN: [id number]          Reason for Visit: OP Infusion (Simponi 150 mg every 28 days)         Today: We administered methylPREDNISolone sod succinate, ketorolac, and golimumab (Simponi Aria) 150 mg in sodium chloride 0.9% 100 mL IV.       Visit Type: INFUSION       Ordered By: Dr. Garcia       Accompanied by:       Diagnosis: Polyarthritis with negative rheumatoid factor (Multi)        Allergies:   Allergies as of 2025 - Reviewed 2025   Allergen Reaction Noted    Penicillin Shortness of breath 2023    Penicillins Shortness of breath, Anaphylaxis, and Swelling 10/29/2008    Bee pollen Unknown 01/10/2024    House dust Unknown 01/10/2024          Current Medications has a current medication list which includes the following prescription(s): albuterol, albuterol, benzonatate, cyclobenzaprine, dupixent pen, epipen 2-mohamud, fexofenadine, fluticasone, trelegy ellipta, folic acid, lidocaine, meclizine, dulera, naloxone, nebulizer accessories, oxycodone-acetaminophen, oxycodone-acetaminophen, prednisone, sulfasalazine, and tramadol, and the following Facility-Administered Medications: golimumab (Simponi Aria) 150 mg in sodium chloride 0.9% 100 mL IV.       Vitals:   Vitals:    25 1308   BP: 122/72   Pulse: 66   Resp: 18   Temp: 36.8 °C (98.3 °F)   SpO2: 98%   Weight: 79 kg (174 lb 3.2 oz)   PainSc:   5   PainLoc: Generalized   LMP: 2024             Infusion Pre-procedure Checklist:   - Allergies reviewed: yes   - Medications reviewed: yes       - Previous reaction to current treatment: no      Assess patient for the concerns below. Document provider notification as appropriate.  - Active or recent infection with/without current antibiotic use: no  - Recent or planned invasive dental work: no  - Recent or planned surgeries: no  - Recently received or plans to receive  vaccinations: no  - Has treatment related toxicities: no  - Is pregnant:  no      Pain: 5   - Is the pain different from normal: no   - Is your Doctor aware:  n/a       Labs:  reviewed          Fall Risk Screening: Bar Fall Risk  History of Falling, Immediate or Within 3 Months: No  Secondary Diagnosis: Yes  Ambulatory Aid: Walks without aid/bedrest/nurse assist  Intravenous Therapy/Heparin Lock: Yes  Gait/Transferring: Normal/bedrest/immobile  Mental Status: Oriented to own ability  Bar Fall Risk Score: 35       Review Of Systems:  Review of Systems   Constitutional:  Negative for appetite change, chills, fatigue, fever and unexpected weight change.   HENT:   Negative for hearing loss, mouth sores, sore throat, tinnitus, trouble swallowing and voice change.    Eyes:  Negative for eye problems.   Respiratory:  Negative for cough, shortness of breath and wheezing.    Cardiovascular:  Negative for chest pain, leg swelling and palpitations.   Gastrointestinal:  Negative for abdominal pain, blood in stool, constipation, diarrhea, nausea and vomiting.   Genitourinary:  Negative for dysuria, frequency and hematuria.    Musculoskeletal:  Positive for arthralgias. Negative for myalgias.   Skin:  Negative for itching, rash and wound.   Neurological:  Negative for dizziness, extremity weakness, headaches, light-headedness and numbness.         ROS completed? Yes      Infusion Readiness:  - Assessment Concerns Related to Infusion: No  - Provider notified: n/a      Document Below Only If Indicated:   New Patient Education:    N/A (returning patient for continuation of therapy. Ongoing education provided as needed.)        Treatment Conditions & Drug Specific Questions:    Golimumab  (SIMPONI)    (Unless otherwise specified on patient specific therapy plan):     TREATMENT CONDITIONS:  Unless otherwise specified on patient specific thearpy plan HOLD and notify provider prior to proceeding with today's infusion if patient  "with:  o Positive T-Spot  o Positive Hepatitis B Surface Ag / Hepatitis C     Lab Results   Component Value Date    TBGRES Negative  Reference range: NEGATIVE   10/14/2020    TSPOTR  09/12/2023     Negative  Reference range: Normal Value: Negative    A negative test result does not exclude the possibility of exposure  to   or infection with Mycobacterium tuberculosis (M. tuberculosis).    Patients with recent exposure to TB infected individuals exhibiting a   negative T-SPOT.TB result should be considered for retesting within 6   weeks or if other relevant clinical symptoms indicate.  Results from   T-SPOT.TB testing must be used in conjunction with each individual's   epidemiological history, current medical status, and results of other   diagnostic evaluations.  The T-SPOT.TB test is qualitative and  results   are reported as positive, borderline or negative, given that the test   controls perform as expected. In line with the Centers for Disease   Control and Prevention's 2010 recommendation to report quantitative   measurements alongside the qualitative result, the laboratory  provides   spot counts for informational purposes only.  The T-SPOT.TB test  should   not be interpreted as a quantitative test.  Test performed at:  [address] [address]        Lab Results   Component Value Date    HEPBSAG NEGATIVE 10/14/2020      No results found for: \"NONUHFIRE\", \"NONUHSWGH\", \"NONUHFISH\", \"EXTHEPBSAG\"  Lab Results   Component Value Date    HEPCAB  10/14/2020     Negative  Reference range: NEGATIVE  Performed at the [facility] unless   otherwise noted.        Lab Results   Component Value Date    HEPCAB  10/14/2020     Negative  Reference range: NEGATIVE  Performed at the [facility] unless   otherwise noted.       No results found for: \"HBCTI\", \"HEPBCAB\"    Lab Results   Component Value Date    WBC 7.4 12/02/2024    HGB 13.3 12/02/2024    HCT " 42.8 12/02/2024    MCV 85 12/02/2024     12/02/2024        Labs reviewed and patient meets treatment conditions? Yes    DRUG SPECIFIC QUESTIONS:   - Up to date on all immunizations per patient report? Yes    Available Immunization Records:  Immunization History   Administered Date(s) Administered    Flu vaccine (IIV4), preservative free *Check age/dose* 09/17/2020    Influenza, injectable, quadrivalent 10/14/2020    PPD Test 02/24/2012, 03/02/2012, 09/20/2019    Pneumococcal conjugate vaccine, 20-valent (PREVNAR 20) 06/21/2023    Td (adult) 02/26/2011    Tdap vaccine, age 7 year and older (BOOSTRIX, ADACEL) 09/17/2020, 11/10/2020         - Any history of or new or worsening s/s of heart failure which may include dyspnea, edema? No  (If worsening s/s notify provider prior to proceeding. Simponi may cause exacerbation of heart failure)     - Any new diagnosis of cancer, especially lymphomas? No    (Box Warning: Malignancy. If YES notify prescribing provider prior to proceeding )      REMINDER:  WEIGHT BASED DRUG    Recommended Vitals/Observation:  Vitals: Take vital signs prior to starting infusion, at infusion conclusion and as needed.   Observation: No observation.        Weight Based Drug Calculations:    WEIGHT BASED DRUGS: Golimumab (SIMPONI)   Patient's dosing weight (kg): 74.8     10% weight variance for prescribed treatment: 67.4 kg to 82.2 kg     Patient's weight today:   Vitals:    01/03/25 1308   Weight: 79 kg (174 lb 3.2 oz)         weight range for prescribed dose:     Patient weight today falls outside of 10% variance or  weight range: No     Home Care pharmacist informed of weight variance: Not applicable    Doses that are weight based have an acceptable variance rule within 10% of the prescribed   order and/or within  weight range. If patient weight on day of infusion falls   outside of the 10% variance, or weight range, infusion is administered and    pharmacy contacted regarding future dosing adjustments, per policy.         Initiated By: Lamonte Camacho RN     1420 Line flushed with NS 50 ml when infusion bag completed. VSS.  _________________________________________________________________________    Note authored and patient cared for by: Lamonte Camacho RN  Note/Encounter reviewed by: Brook CHENG NP. This provider on site at time of patient infusion. Infusion staff to notify this provider of any questions, concerns, abnormals or issues during infusion.    Final check of medication completed by this PROSPER / or on-site pharmacist with administering nurse using positive identification prior to administration. Final appearance of product checked for accuracy and conformity to the formula of the prepared product. Assured use of correct ingredients, accurate calculations and precise measurements under appropriate conditions and procedures.    No issues reported during today's encounter. Pt. tolerated infusion without difficulty. Pt. not independently evaluated by this provider during today's encounter.  (Brook Hough PROSPER NP)

## 2025-01-06 ENCOUNTER — HOSPITAL ENCOUNTER (OUTPATIENT)
Dept: RADIOLOGY | Facility: EXTERNAL LOCATION | Age: 45
Discharge: HOME | End: 2025-01-06

## 2025-01-06 ENCOUNTER — OFFICE VISIT (OUTPATIENT)
Dept: ORTHOPEDIC SURGERY | Facility: HOSPITAL | Age: 45
End: 2025-01-06
Payer: COMMERCIAL

## 2025-01-06 ENCOUNTER — PREP FOR PROCEDURE (OUTPATIENT)
Dept: ORTHOPEDIC SURGERY | Facility: HOSPITAL | Age: 45
End: 2025-01-06

## 2025-01-06 VITALS — BODY MASS INDEX: 27.32 KG/M2 | HEIGHT: 65 IN | WEIGHT: 164 LBS

## 2025-01-06 DIAGNOSIS — M77.12 LEFT LATERAL EPICONDYLITIS: Primary | ICD-10-CM

## 2025-01-06 DIAGNOSIS — M77.12 LATERAL EPICONDYLITIS OF LEFT ELBOW: ICD-10-CM

## 2025-01-06 PROCEDURE — 76882 US LMTD JT/FCL EVL NVASC XTR: CPT | Performed by: EMERGENCY MEDICINE

## 2025-01-06 PROCEDURE — 99213 OFFICE O/P EST LOW 20 MIN: CPT | Mod: 25 | Performed by: EMERGENCY MEDICINE

## 2025-01-06 PROCEDURE — 3008F BODY MASS INDEX DOCD: CPT | Performed by: EMERGENCY MEDICINE

## 2025-01-06 PROCEDURE — 99243 OFF/OP CNSLTJ NEW/EST LOW 30: CPT | Performed by: EMERGENCY MEDICINE

## 2025-01-06 PROCEDURE — 1036F TOBACCO NON-USER: CPT | Performed by: EMERGENCY MEDICINE

## 2025-01-06 ASSESSMENT — PAIN SCALES - GENERAL: PAINLEVEL_OUTOF10: 7

## 2025-01-06 ASSESSMENT — PAIN - FUNCTIONAL ASSESSMENT: PAIN_FUNCTIONAL_ASSESSMENT: 0-10

## 2025-01-06 NOTE — PROGRESS NOTES
"Subjective   Susan Ford is a 44 y.o. female who presents for Pain of the Left Elbow    HPI  44-year-old right-hand-dominant female referred by Dr. Valentino for left lateral elbow pain that she is had for quite some time.  She is in previously diagnosed with lateral epicondylitis.  She has tried activity modifications and counterforce bracing without significant improvement.  Considering this and continued pain, she deferred discussed additional treatment options, specifically percutaneous tenotomy.    ROS: All pertinent positive symptoms are included in the history of present illness.    All other systems have been reviewed and are negative and noncontributory to this patient's current ailments.    Objective     Vitals:    01/06/25 1445   Weight: 74.4 kg (164 lb)   Height: 1.638 m (5' 4.5\")       Physical Exam  General/Constitutional: No apparent distress. Well-nourished and well developed.  Head: Normocephalic, Atraumatic.   Eyes: EOMI.  Vascular: No edema, swelling or tenderness, except as noted in detailed exam.  Respiratory: Non-labored breathing.  Integumentary: No impressive skin lesions present, except as noted in detailed exam.  Neurological: Alert and oriented.  Psychological:  Normal mood and affect.  Musculoskeletal: Normal, except as noted in detailed exam.  Left elbow: No rash.  No deformity.  No edema.  Exquisite tenderness over the lateral epicondyle.  Full AROM total planes.  Full muscle strength all planes.  Pain with resisted wrist extension and supination.  Negative valgus stress.  Negative varus stress.  Jack sensation intact throughout.  Median, ulnar, and radial nerves intact distally.    Imaging:   I have personally reviewed and agree with Radiologist interpretation of previous imaging studies performed prior to this visit.   STUDY:  XR ELBOW LEFT 3+ VIEWS      INDICATION:  Signs/Symptoms:L elbow pain.      COMPARISON:  None      ACCESSION NUMBER(S):  [id number]      ORDERING " CLINICIAN:  ROMELIA LANDIS      FINDINGS:  Tiny left triceps spur. No other osseous, articular, or soft tissue  abnormality.      IMPRESSION:  Tiny left triceps spur.      Signed by: You Bunch 3/30/2024 12:39 PM  Dictation workstation:   [id number]    Musculoskeletal Ultrasound:  Left lateral elbow:  Indication: Lateral elbow pain.  Findings: Common Extensor tendon intact with multiple regions of hypoechoic defects consistent with micro tearing.  There is enthesophyte production.  Impression: Chronic left elbow common extensor tendinopathy.    Assessment/Plan   Problem List Items Addressed This Visit       Left lateral epicondylitis - Primary    Relevant Orders    Point of Care Ultrasound (Completed)     I discussed imaging and examination findings with patient.  I do agree that her symptoms are likely due to lateral epicondylitis and development of common extensor tendinopathy.  Considering she has pain despite previous conservative measures, I do feel that she would benefit from percutaneous tenotomy.  We discussed the procedure.  We discussed the risks versus benefits.  I provided her with additional information.  She would likely proceed with this in the near future.  Meantime, she can continue all activities as tolerated.    Rosales Weber,   Sports Medicine  [facility], [facility] [place]    ** Please excuse any errors in grammar or translation related to this dictation. Voice recognition software was utilized to prepare this document. **

## 2025-01-13 ENCOUNTER — PATIENT MESSAGE (OUTPATIENT)
Dept: RHEUMATOLOGY | Facility: CLINIC | Age: 45
End: 2025-01-13
Payer: COMMERCIAL

## 2025-01-13 DIAGNOSIS — G89.29 OTHER CHRONIC PAIN: ICD-10-CM

## 2025-01-13 DIAGNOSIS — M06.09 POLYARTHRITIS WITH NEGATIVE RHEUMATOID FACTOR (MULTI): ICD-10-CM

## 2025-01-14 ENCOUNTER — PATIENT OUTREACH (OUTPATIENT)
Dept: PRIMARY CARE | Facility: CLINIC | Age: 45
End: 2025-01-14
Payer: COMMERCIAL

## 2025-01-14 ENCOUNTER — EVALUATION (OUTPATIENT)
Dept: PHYSICAL THERAPY | Facility: CLINIC | Age: 45
End: 2025-01-14
Payer: COMMERCIAL

## 2025-01-14 DIAGNOSIS — M06.09 RHEUMATOID ARTHRITIS OF MULTIPLE SITES WITHOUT RHEUMATOID FACTOR (MULTI): ICD-10-CM

## 2025-01-14 DIAGNOSIS — M19.90 OSTEOARTHRITIS, UNSPECIFIED OSTEOARTHRITIS TYPE, UNSPECIFIED SITE: ICD-10-CM

## 2025-01-14 DIAGNOSIS — S83.242A ACUTE MEDIAL MENISCUS TEAR, LEFT, INITIAL ENCOUNTER: Primary | ICD-10-CM

## 2025-01-14 PROCEDURE — 97161 PT EVAL LOW COMPLEX 20 MIN: CPT | Mod: GP

## 2025-01-14 PROCEDURE — 97110 THERAPEUTIC EXERCISES: CPT | Mod: GP

## 2025-01-14 RX ORDER — OXYCODONE AND ACETAMINOPHEN 10; 325 MG/1; MG/1
1 TABLET ORAL EVERY 4 HOURS PRN
Qty: 120 TABLET | Refills: 0 | Status: SHIPPED | OUTPATIENT
Start: 2025-01-17

## 2025-01-14 NOTE — PROGRESS NOTES
Physical Therapy    Physical Therapy Evaluation and Treatment      Patient Name: Susan Ford  MRN: [id number]  Today's Date: 1/14/2025    Time Entry:   Time Calculation  Start Time: 1300  Stop Time: 1350  Time Calculation (min): 50 min  PT Evaluation Time Entry  PT Evaluation (Low) Time Entry: 10  PT Therapeutic Procedures Time Entry  Therapeutic Exercise Time Entry: 30  Insurance: [email or web address] [facility]   Visit Limit: Auth Required After 8 Visits  Visit: 1   Outcome measure LEFS: 7/80  Number of visits requested: 20   Date range of visits requested: 1/14/25-4/14/25     Assessment:  PT Assessment  Assessment Comment: Patient is a 45 y/o female who was referred to outpatient physical therapy status post a left knee diagnostic left knee arthroscopy with limited synovectomy and removal of ACL/cyclops lesion and open proximal patellofemoral realignment with VMO advancement on 12/18/24. She will benefit from medically necessary skilled physical therapy interventions to improve left knee ROM, strength and gait to facilitate daily activities.     Plan:  OP PT Plan  Treatment/Interventions: Cryotherapy, Education/ Instruction, Electrical stimulation, Gait training, Hot pack, Manual therapy, Neuromuscular re-education, Therapeutic activities, Therapeutic exercises, Vasopneumatic device  PT Plan: Skilled PT  PT Frequency: 2 times per week  Duration: 10  Onset Date: 12/18/24  Certification Period Start Date: 01/14/25  Certification Period End Date: 04/14/25  Number of Treatments Authorized: Auth Required After 8 Visits  Rehab Potential: Excellent  Plan of Care Agreement: Patient    Current Problem:   Problem List Items Addressed This Visit    None  Visit Diagnoses         Codes    Acute medial meniscus tear, left, initial encounter    -  Primary S83.242A    Relevant Orders    Follow Up In Physical Therapy              Subjective    General:  General  Reason for Referral: left knee arthroscopy  Referred By: Enedina  "Jose Hernandez MD  Preferred Learning Style: auditory, kinesthetic, verbal, visual, written  General Comment: Patient is a 45 y/o female who was referred to outpatient physical therapy status post a left knee diagnostic left knee arthroscopy with limited synovectomy and removal of ACL/cyclops lesion and open proximal patellofemoral realignment with VMO advancement on 12/18/24. She reports a past left knee patellofemoral reconstruction surgery in 2014. She has a past medical history of Rheumatoid arthritis, lupus and osteoarthritis. She arrives to therapy ambulating with crutches and has a brace on her left knee. She notes that she also uses a rollator. Today she rates her pain at 3-4/10. For pain relief she is using Oxy and Tramadol. She has not been icing very often.  Precautions:  Precautions  STEADI Fall Risk Score (The score of 4 or more indicates an increased risk of falling): 11  Pain:   3-4/10  Prior Level of Function:   Independent with all ADLs    Objective   Cognition:   WNL  General Assessments:  Range of Motion Comments: Right knee ROM (in degrees): 0-0-135     Left knee ROM (in degrees): 0-0-85    Strength Comments: LE strength (R/L)     Knee flexion 5/5, 3+/5     Knee extension 5/5, 3+/5  Functional Assessments:  Gait Comment: Antalgic gait pattern with crutches.    Outcome Measures:  LEFS: 7/80    Treatments:  Therapeutic Exercise  Therapeutic Exercise Performed: Yes  Therapeutic Exercise Activity 1: hamstring stretch 3 x 30\"  Therapeutic Exercise Activity 2: calf stretch 3 x 30\"  Therapeutic Exercise Activity 3: quad sets 2 x 10 x 5\"  Therapeutic Exercise Activity 4: heel slides 2 x 10 x 5\"  Therapeutic Exercise Activity 5: SAQ 2 x 10 x 3\"  Therapeutic Exercise Activity 6: seated SLR x 10  Therapeutic Exercise Activity 7: knee flexion stretch on step 2 x 10 x 5\"    EDUCATION:   Home exercise program daily.     Goals:  Active       PT Problem       Patient will report compliance with her home exercise " program.         Start:  01/14/25    Expected End:  04/14/25            Patient will report improvement via the LEFS to demonstrate improved activity tolerance.        Start:  01/14/25    Expected End:  04/14/25            Patient will demonstrate improvements in left knee ROM to 0-0-120 to facilitate stairs.         Start:  01/14/25    Expected End:  04/14/25            Patient will demonstrate improvements in left knee strength to 5/5 to facilitate weight bearing activity.         Start:  01/14/25    Expected End:  04/14/25            Patient will ambulate with a normalized gait pattern with least restrictive assistive device.         Start:  01/14/25    Expected End:  04/14/25

## 2025-01-14 NOTE — PROGRESS NOTES
Outreach call to patient finds her doing well post knee surgery.  She continues to heal and feels like knee is more stable and that it was successful procedure.  She is excited to begin PT today.  Discussed premedicating for pain before session and icing afterwards.  She was able to see another surgeon regarding her elbow and scheduled for procedure called tenex in April that should have little down time and recovery.  It will be done on a Friday and hopes to be well by Monday.  She was scheduled to be off work until April but financially may need to return sooner than that but wants to ensure she won't have to call off for her therapy sessions.  She is breathing ok and still on Dupixent.  Discussed wearing scarf when outdoor in current frigid temperatures.  Patient understands and avoids cold at all costs.  She will be dropped off at door when taken to appointments. No other questions voiced at this time.

## 2025-01-16 ENCOUNTER — APPOINTMENT (OUTPATIENT)
Dept: ORTHOPEDIC SURGERY | Facility: CLINIC | Age: 45
End: 2025-01-16
Payer: COMMERCIAL

## 2025-01-18 RX ORDER — OXYCODONE AND ACETAMINOPHEN 10; 325 MG/1; MG/1
1 TABLET ORAL EVERY 6 HOURS PRN
Qty: 120 TABLET | Refills: 0 | Status: SHIPPED | OUTPATIENT
Start: 2025-01-18

## 2025-01-20 ENCOUNTER — TELEPHONE (OUTPATIENT)
Dept: RHEUMATOLOGY | Facility: CLINIC | Age: 45
End: 2025-01-20
Payer: COMMERCIAL

## 2025-01-20 ENCOUNTER — DOCUMENTATION (OUTPATIENT)
Dept: PHYSICAL THERAPY | Facility: CLINIC | Age: 45
End: 2025-01-20
Payer: COMMERCIAL

## 2025-01-20 DIAGNOSIS — G89.29 OTHER CHRONIC PAIN: ICD-10-CM

## 2025-01-20 DIAGNOSIS — M06.09 POLYARTHRITIS WITH NEGATIVE RHEUMATOID FACTOR (MULTI): ICD-10-CM

## 2025-01-20 RX ORDER — OXYCODONE AND ACETAMINOPHEN 10; 325 MG/1; MG/1
1 TABLET ORAL EVERY 6 HOURS PRN
Qty: 120 TABLET | Refills: 0 | Status: SHIPPED | OUTPATIENT
Start: 2025-01-20

## 2025-01-20 NOTE — PROGRESS NOTES
Physical Therapy                 Therapy Communication Note    Patient Name: Susan Ford  MRN: [id number]  Department:   Room: Room/bed info not found  Today's Date: 1/20/2025     Discipline: Physical Therapy          Missed Visit Reason:      Missed Time: No Show    Comment:

## 2025-01-21 NOTE — PROGRESS NOTES
History of Present Illness:  Date of Surgery: 12/18/24 diagnostic left knee arthroscopy with limited synovectomy and removal of ACL/cyclops lesion and open proximal patellofemoral realignment with VMO advancement. Overall she is doing well and progressing with therapy. She already feels that her knee is more stable.     Exam:  Mild effusion  Incision is healed  Flexion to 100  Moderate bruising  No calf tenderness   Negative Geraldo's test  Distal neurovascular exam intact    Assessment:  S/p diagnostic left knee arthroscopy with limited synovectomy and removal of ACL/cyclops lesion and open proximal patellofemoral realignment with VMO advancement    Plan:  Increase manual therapy for motion with therapist at  as she is still slightly stiff.   Follow-up in 4 weeks     Scribe Attestation  By signing my name below, ILeela Scribe   attest that this documentation has been prepared under the direction and in the presence of Hayden Goldman MD.

## 2025-01-23 ENCOUNTER — APPOINTMENT (OUTPATIENT)
Dept: ORTHOPEDIC SURGERY | Facility: CLINIC | Age: 45
End: 2025-01-23
Payer: COMMERCIAL

## 2025-01-23 ENCOUNTER — OFFICE VISIT (OUTPATIENT)
Dept: ORTHOPEDIC SURGERY | Facility: CLINIC | Age: 45
End: 2025-01-23
Payer: COMMERCIAL

## 2025-01-23 DIAGNOSIS — Z98.890 S/P LEFT KNEE ARTHROSCOPY: Primary | ICD-10-CM

## 2025-01-23 PROCEDURE — 99211 OFF/OP EST MAY X REQ PHY/QHP: CPT | Performed by: ORTHOPAEDIC SURGERY

## 2025-01-28 ENCOUNTER — APPOINTMENT (OUTPATIENT)
Dept: PHYSICAL THERAPY | Facility: CLINIC | Age: 45
End: 2025-01-28
Payer: COMMERCIAL

## 2025-01-28 ENCOUNTER — DOCUMENTATION (OUTPATIENT)
Dept: PHYSICAL THERAPY | Facility: CLINIC | Age: 45
End: 2025-01-28
Payer: COMMERCIAL

## 2025-01-28 NOTE — PROGRESS NOTES
Physical Therapy    Physical Therapy Treatment    Patient Name: Susan Ford  MRN: [id number]  Today's Date: 1/28/2025    Time Entry:                             Assessment:     Plan:       Current Problem  No diagnosis found.    General          Subjective    Precautions     Vital Signs     Pain       Objective   Cognition     Coordination:     Posture     Extremity/Trunk Assessment  {Extremity/Trunk Assessments:86787}  {Spine,UE,LE,HAND,LYMPHEDEMA:82050}  Activity Tolerance:     Outcome Measures:  {PT Outcome Measures:86431}  Treatments:  {PT Treatments:09813}    OP EDUCATION:       Goals:  Active       PT Problem       Patient will report compliance with her home exercise program.         Start:  01/14/25    Expected End:  04/14/25            Patient will report improvement via the LEFS to demonstrate improved activity tolerance.        Start:  01/14/25    Expected End:  04/14/25            Patient will demonstrate improvements in left knee ROM to 0-0-120 to facilitate stairs.         Start:  01/14/25    Expected End:  04/14/25            Patient will demonstrate improvements in left knee strength to 5/5 to facilitate weight bearing activity.         Start:  01/14/25    Expected End:  04/14/25            Patient will ambulate with a normalized gait pattern with least restrictive assistive device.         Start:  01/14/25    Expected End:  04/14/25

## 2025-01-29 NOTE — PROGRESS NOTES
Physical Therapy                 Therapy Communication Note    Patient Name: Susan Ford  MRN: [id number]  Department:   Room: Room/bed info not found  Today's Date: 1/28/2025     Discipline: Physical Therapy          Missed Visit Reason:      Missed Time: Cancel    Comment: 2nd cx/ns

## 2025-01-30 ENCOUNTER — APPOINTMENT (OUTPATIENT)
Dept: INFUSION THERAPY | Facility: CLINIC | Age: 45
End: 2025-01-30
Payer: COMMERCIAL

## 2025-01-30 ENCOUNTER — APPOINTMENT (OUTPATIENT)
Dept: PHYSICAL THERAPY | Facility: CLINIC | Age: 45
End: 2025-01-30
Payer: COMMERCIAL

## 2025-01-30 VITALS
HEART RATE: 61 BPM | SYSTOLIC BLOOD PRESSURE: 115 MMHG | RESPIRATION RATE: 18 BRPM | DIASTOLIC BLOOD PRESSURE: 68 MMHG | TEMPERATURE: 99.2 F

## 2025-01-30 DIAGNOSIS — M06.09 POLYARTHRITIS WITH NEGATIVE RHEUMATOID FACTOR (MULTI): ICD-10-CM

## 2025-01-30 PROCEDURE — 96375 TX/PRO/DX INJ NEW DRUG ADDON: CPT | Performed by: NURSE PRACTITIONER

## 2025-01-30 PROCEDURE — 96365 THER/PROPH/DIAG IV INF INIT: CPT | Performed by: NURSE PRACTITIONER

## 2025-01-30 RX ORDER — KETOROLAC TROMETHAMINE 30 MG/ML
30 INJECTION, SOLUTION INTRAMUSCULAR; INTRAVENOUS ONCE
Status: COMPLETED | OUTPATIENT
Start: 2025-01-30 | End: 2025-01-30

## 2025-01-30 RX ORDER — DIPHENHYDRAMINE HYDROCHLORIDE 50 MG/ML
50 INJECTION INTRAMUSCULAR; INTRAVENOUS AS NEEDED
OUTPATIENT
Start: 2025-01-31

## 2025-01-30 RX ORDER — ALBUTEROL SULFATE 0.83 MG/ML
3 SOLUTION RESPIRATORY (INHALATION) AS NEEDED
OUTPATIENT
Start: 2025-01-31

## 2025-01-30 RX ORDER — FAMOTIDINE 10 MG/ML
20 INJECTION INTRAVENOUS ONCE AS NEEDED
OUTPATIENT
Start: 2025-01-31

## 2025-01-30 RX ORDER — KETOROLAC TROMETHAMINE 30 MG/ML
30 INJECTION, SOLUTION INTRAMUSCULAR; INTRAVENOUS ONCE
OUTPATIENT
Start: 2025-01-31 | End: 2025-01-31

## 2025-01-30 RX ORDER — EPINEPHRINE 0.3 MG/.3ML
0.3 INJECTION SUBCUTANEOUS EVERY 5 MIN PRN
OUTPATIENT
Start: 2025-01-31

## 2025-01-30 RX ADMIN — KETOROLAC TROMETHAMINE 30 MG: 30 INJECTION, SOLUTION INTRAMUSCULAR; INTRAVENOUS at 14:13

## 2025-01-30 ASSESSMENT — ENCOUNTER SYMPTOMS
SHORTNESS OF BREATH: 0
COUGH: 0
DIZZINESS: 0
WHEEZING: 0
BLOOD IN STOOL: 0
VOICE CHANGE: 0
ARTHRALGIAS: 0
UNEXPECTED WEIGHT CHANGE: 0
SORE THROAT: 0
MYALGIAS: 0
DYSURIA: 0
HEMATURIA: 0
LEG SWELLING: 0
WOUND: 0
NUMBNESS: 0
DIARRHEA: 0
CHILLS: 0
PALPITATIONS: 0
VOMITING: 0
NAUSEA: 0
FATIGUE: 0
FEVER: 0
TROUBLE SWALLOWING: 0
FREQUENCY: 0
EXTREMITY WEAKNESS: 0
HEADACHES: 0
LIGHT-HEADEDNESS: 0
CONSTIPATION: 0
ABDOMINAL PAIN: 0
EYE PROBLEMS: 0
APPETITE CHANGE: 0

## 2025-01-30 NOTE — PROGRESS NOTES
[facility]   Infusion Clinic Note   Date: 2025   Name: Susan Ford  : 1980   MRN: [id number]          Reason for Visit: OP Infusion (Golimumab 150mg every 28 days )         Today: We administered methylPREDNISolone sod succinate, ketorolac, and golimumab (Simponi Aria) 150 mg in sodium chloride 0.9% 100 mL IV.       Visit Type: INFUSION       Ordered By: Enedina Clements,*        Accompanied by: Self       Diagnosis: Polyarthritis with negative rheumatoid factor (Multi)        Allergies:   Allergies as of 2025 - Reviewed 2025   Allergen Reaction Noted    Penicillin Shortness of breath 2023    Penicillins Shortness of breath, Anaphylaxis, and Swelling 10/29/2008    Bee pollen Unknown 01/10/2024    House dust Unknown 01/10/2024          Current Medications: has a current medication list which includes the following prescription(s): albuterol, albuterol, benzonatate, cyclobenzaprine, dupixent pen, epipen 2-mohamud, fexofenadine, fluticasone, trelegy ellipta, folic acid, lidocaine, meclizine, dulera, naloxone, nebulizer accessories, oxycodone-acetaminophen, oxycodone-acetaminophen, prednisone, sulfasalazine, and tramadol.       Vitals:   Vitals:    25 1404 25 1459   BP: 118/65 115/68   Pulse: 67 61   Resp: 18    Temp: 36.8 °C (98.3 °F) 37.3 °C (99.2 °F)   LMP: 2024             Infusion Pre-procedure Checklist:   - Allergies & Medications reviewed: yes       - Previous reaction to current treatment: no      Assess patient for the concerns below. Document provider notification as appropriate.  - Active or recent infection with/without current antibiotic use: no  - Recent or planned invasive dental work: no  - Recent or planned surgeries: no  - Recently received or plans to receive vaccinations: no  - Has treatment related toxicities: no  - Any chance you may be pregnant:  no      Pain: 0   - Is the pain different from normal: no   - Is  your Doctor aware:  no       Labs: N/A          Fall Risk Screening: Bar Fall Risk  History of Falling, Immediate or Within 3 Months: No  Secondary Diagnosis: No  Ambulatory Aid: Walks without aid/bedrest/nurse assist  Intravenous Therapy/Heparin Lock: Yes  Gait/Transferring: Normal/bedrest/immobile  Mental Status: Oriented to own ability  Bar Fall Risk Score: 20       Review Of Systems:  Review of Systems   Constitutional:  Negative for appetite change, chills, fatigue, fever and unexpected weight change.   HENT:   Negative for hearing loss, mouth sores, sore throat, tinnitus, trouble swallowing and voice change.    Eyes:  Negative for eye problems.   Respiratory:  Negative for cough, shortness of breath and wheezing.    Cardiovascular:  Negative for chest pain, leg swelling and palpitations.   Gastrointestinal:  Negative for abdominal pain, blood in stool, constipation, diarrhea, nausea and vomiting.   Genitourinary:  Negative for dysuria, frequency and hematuria.    Musculoskeletal:  Negative for arthralgias and myalgias.   Skin:  Negative for itching, rash and wound.   Neurological:  Negative for dizziness, extremity weakness, headaches, light-headedness and numbness.         ROS completed? Yes      Infusion Readiness:  - Assessment Concerns Related to Infusion: No  - Provider notified: no      Document Below Only If Indicated:   New Patient Education:    N/A (returning patient for continuation of therapy. Ongoing education provided as needed.)        Treatment Conditions & Drug Specific Questions:    Golimumab  (SIMPONI)    (Unless otherwise specified on patient specific therapy plan):     TREATMENT CONDITIONS:  Unless otherwise specified on patient specific thearpy plan HOLD and notify provider prior to proceeding with today's infusion if patient with:  o Positive T-Spot  o Positive Hepatitis B Surface Ag / Hepatitis C     Lab Results   Component Value Date    TBGRES Negative  Reference range: NEGATIVE    "10/14/2020    TSPOTR  09/12/2023     Negative  Reference range: Normal Value: Negative    A negative test result does not exclude the possibility of exposure  to   or infection with Mycobacterium tuberculosis (M. tuberculosis).    Patients with recent exposure to TB infected individuals exhibiting a   negative T-SPOT.TB result should be considered for retesting within 6   weeks or if other relevant clinical symptoms indicate.  Results from   T-SPOT.TB testing must be used in conjunction with each individual's   epidemiological history, current medical status, and results of other   diagnostic evaluations.  The T-SPOT.TB test is qualitative and  results   are reported as positive, borderline or negative, given that the test   controls perform as expected. In line with the Centers for Disease   Control and Prevention's 2010 recommendation to report quantitative   measurements alongside the qualitative result, the laboratory  provides   spot counts for informational purposes only.  The T-SPOT.TB test  should   not be interpreted as a quantitative test.  Test performed at:  [facility] [address] [place] [id number]        Lab Results   Component Value Date    HEPBSAG NEGATIVE 10/14/2020      No results found for: \"NONUHFIRE\", \"NONUHSWGH\", \"NONUHFISH\", \"EXTHEPBSAG\"  Lab Results   Component Value Date    HEPCAB  10/14/2020     Negative  Reference range: NEGATIVE  Performed at the [facility] unless   otherwise noted.        Lab Results   Component Value Date    HEPCAB  10/14/2020     Negative  Reference range: NEGATIVE  Performed at the [facility] unless   otherwise noted.       No results found for: \"HBCTI\", \"HEPBCAB\"    Lab Results   Component Value Date    WBC 7.4 12/02/2024    HGB 13.3 12/02/2024    HCT 42.8 12/02/2024    MCV 85 12/02/2024     12/02/2024        Labs reviewed and patient meets treatment conditions? Yes    DRUG SPECIFIC QUESTIONS:   - Up to " date on all immunizations per patient report? Yes    Available Immunization Records:  Immunization History   Administered Date(s) Administered    Flu vaccine (IIV4), preservative free *Check age/dose* 09/17/2020    Influenza, injectable, quadrivalent 10/14/2020    PPD Test 02/24/2012, 03/02/2012, 09/20/2019    Pneumococcal conjugate vaccine, 20-valent (PREVNAR 20) 06/21/2023    Td (adult) 02/26/2011    Tdap vaccine, age 7 year and older (BOOSTRIX, ADACEL) 09/17/2020, 11/10/2020         - Any history of or new or worsening s/s of heart failure which may include dyspnea, edema? No  (If worsening s/s notify provider prior to proceeding. Simponi may cause exacerbation of heart failure)     - Any new diagnosis of cancer, especially lymphomas? No    (Box Warning: Malignancy. If YES notify prescribing provider prior to proceeding )      REMINDER:  WEIGHT BASED DRUG    Recommended Vitals/Observation:  Vitals: Take vital signs prior to starting infusion, at infusion conclusion and as needed.   Observation: No observation.        Weight Based Drug Calculations:    WEIGHT BASED DRUGS: NOT APPLICABLE / FLAT DOSE          Initiated By: Gemma Teixeira RN   Pt tolerated infusion well. 50 ml flush done after. Vs stable   _________________________________________________________________________    Note authored and patient cared for by: Gemma Teixeira RN  Note/Encounter reviewed by: Brook CHENG NP. This provider on site at time of patient infusion. Infusion staff to notify this provider of any questions, concerns, abnormals or issues during infusion.    Final check of medication completed by this PROSPER / or on-site pharmacist with administering nurse using positive identification prior to administration. Final appearance of product checked for accuracy and conformity to the formula of the prepared product. Assured use of correct ingredients, accurate calculations and precise measurements under appropriate conditions and  procedures.    No issues reported during today's encounter. Pt. tolerated infusion without difficulty. Pt. not independently evaluated by this provider during today's encounter.  (Brook CHENG NP)

## 2025-01-30 NOTE — PATIENT INSTRUCTIONS
GENERAL ANESTHESIA/IV SEDATION/SPINAL POST SURGERY INSTRUCTIONS    1. Rest at home (not necessarily in bed) for 24 hours following anesthesia. You may feel sleepy for the next 24 hours. Do not drive an automobile, operate heavy machinery, or make important decisions.    2. Your diet should start with clear liquids increasing to a light diet on the day of surgery. You may then resume your normal diet. Do not drink alcoholic beverages for 24 hours. If nausea occurs, limit diet to clear liquids (soda, tea, broth, Jell-O). If nausea continues for more than 12 hours, call your doctor.    3. The doctor prescribed [ Norco ] for pain. Take as directed. If nothing was prescribed, you may take a non-prescription non- aspirin containing pain medicine such as Tylenol, Advil, etc. If pain is not relieved, call your doctor.    Norco is a narcotic and may make you sleepy. Do not drive, drink alcohol, or do anything that requires your full attention while taking it. Take Norco with food to avoid an upset stomach. Do not take Tylenol and Norco at the same time. Norco may constipate you, please take Colace/an over the counter stool softener to prevent constipation.     4. Call your doctor if there is excessive:   A. Bleeding or drainage   B. Fever-10 1°F or higher   C. Swelling or redness    5. We strongly recommend a responsible adult to be with you for 24 hours following surgery. This recommendation is for your protection and safety.    6. Avoid smoking for 24 hours following general anesthesia.    7. Drink plenty of fluids. If you are unable to urinate by [ 11:15pm ] or a feeling of pressure occurs, call your surgeon and or go to the nearest emergency center.    8. If you any questions please call your Surgeon. If you cannot reach your doctor, call [facility] Emergency Department at [phone].    9. Other instructions:        Rhinoplasty Aftercare Instructions    Day 0 (Day of  Today :We administered methylPREDNISolone sod succinate, ketorolac, and golimumab (Simponi Aria) 150 mg in sodium chloride 0.9% 100 mL IV.     For:   1. 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     (Tell all doctors including dentists that you are taking this medication)     Go to the emergency room or call 911 if:  -You have signs of allergic reaction:   -Rash, hives, itching.   -Swollen, blistered, peeling skin.   -Swelling of face, lips, mouth, tongue or throat.   -Tightness of chest, trouble breathing, swallowing or talking     Call your doctor:  - If IV / injection site gets red, warm, swollen, itchy or leaks fluid or pus.     (Leave dressing on your IV site for at least 2 hours and keep area clean and dry  - If you get sick or have symptoms of infection or are not feeling well for any reason.    (Wash your hands often, stay away from people who are sick)  - If you have side effects from your medication that do not go away or are bothersome.     (Refer to the teaching your nurse gave you for side effects to call your doctor about)    - Common side effects may include:  stuffy nose, headache, feeling tired, muscle aches, upset stomach  - Before receiving any vaccines     - Call the [facility] at   If:  - You get sick, are on antibiotics, have had a recent vaccine, have surgery or dental work and your doctor wants your visit rescheduled.  - You need to cancel and reschedule your visit for any reason. Call at least 2 days before your visit if you need to cancel.   - Your insurance changes before your next visit.    (We will need to get approval from your new insurance. This can take up to two weeks.)     The [facility] is opened Monday thru Friday. We are closed on weekends and holidays.   Voice mail will take your call if  Procedure):    After your rhinoplasty you will have sutures and a hard cast on the nose externally. Inside the nose will be two silicone nasal splints. Expect to have pain and be unable to breathe through your nose. Your [facility] nurse will provide you with instructions for the rest of the day.    Days 1 - Cast Removal (Approximately day 5-7):    Expectations: Expect pain for the first few days after surgery. Bruising and swelling are also expected. Bruising will improve over the next few weeks. Swelling will get worse after cast removal and then begin improving. You will need to breathe through your mouth until your first post-operative appointment, usually day 5-7 after surgery.    Sleep: Sleep elevated with your head above your heart until cast removal.    Diet: We recommend a high protein, low sodium diet to speed healing and reduce swelling. Prioritize hydration by drinking    fluids regularly.    Medications: Take medication for pain and nausea as needed. Follow prescription label instructions. Start your antibiotic Day 1 post-surgery. Continue taking your antibiotic until you finish the course.    Suture Care: Sutures must be cleaned twice per day, once in the morning and once at night. Refer to “Aftercare for Sutured Wounds” for detailed instructions.    Activity: Refrain from strenuous exercise for 3 weeks after surgery. You may do activities around the house and go for light walks once you feel able but keep your heart rate below  BPM.    Bathing: You may bathe the day after your surgery. Be careful not to get your nasal cast wet while bathing or washing your face.    Saline Spray: Use 1-2 sprays of nasal saline spray per nostril every hour while you are awake. Do not inhale the spray. Allow excess saline to drip out of the nose and clean it with a gauze or tissue.    Other: Do not use a nasal decongestant spray. Avoid blowing the nose. Ice for comfort, but not if a fat transfer was performed. Your  the center is closed. If you leave a message please allow 24 hours for a call back during weekdays. If you leave a message on a weekend/holiday, we will call you back the next business day.    A pharmacist is available Monday - Friday from 8:30AM to 3:30PM to help answer any questions you may have about your prescriptions(s). Please call pharmacy at:    [facility]: [phone]  [facility]: [phone]  [facility]: [phone]               nasal cast must remain dry and intact. If you get the cast wet and it becomes loose or loses form, call our office immediately. We will have you come in for recasting.    Your first post-operative appointment will be on day 5-7 after surgery. During that appointment we will remove sutures, pull out the nasal splints, remove your cast, and show you your new nose. This appointment can be uncomfortable. We recommend taking Tylenol an hour prior to your appointment.    Post-Cast Removal:    Nasal Appearance: Expect bruising for the first few weeks after surgery. This will improve with time. Your nose will be significantly swollen after surgery and cast removal. Swelling will improve quickly over the first few weeks, and then slowly over the next year. We will take post-operative photos at months 3 and 6 and at the 1-year kristie to track your progress.    Activity: Avoid strenuous activity for 3 weeks post-operatively. After this, you may return to your normal exercise/activity routine.    Scar Management: Protect your incision lines from direct sunlight. We recommend using a physical sunscreen with a minimum SPF 30. Applying silicone-based products to incision lines post-operatively has been shown to combat scarring. We recommend using Strataderm twice per day, once in the morning and once at night, for a few weeks after suture removal. It is available for purchase in our office.    Skincare and Makeup: You may begin using makeup and return you your normal skincare routine 10 days after surgery. Be gentle when washing and applying products to the nose. Do not exfoliate your nose for 4 weeks post-operatively.    Nasal Exercises: We will teach you nasal exercises during your first post-operative appointment. Do these exercises twice per day, once in the morning and once at night, for 3 weeks after cast removal.    Other: Avoid applying direct pressure to the bridge of your nose for 4-5 weeks after surgery. This includes  wearing glasses, tight masks, etc. Continue using nasal saline spray for 3 weeks. Your second post-operative appointment will be 1 week after cast removal. At this appointment your doctor will assess and clean your nose. Do not attempt to clean or pick your nose prior to this appointment. Do not use nasal decongestant spray. You may resume taking blood thinning medications and supplements and drinking alcohol 7 days after surgery.      Want to recognize a nurse?  The POLY Award® is a way to recognize nurses who provided an outstanding level of care to you during your visit.   Submit a nomination using any method below.     [email or web address]

## 2025-01-31 ENCOUNTER — APPOINTMENT (OUTPATIENT)
Dept: INFUSION THERAPY | Facility: CLINIC | Age: 45
End: 2025-01-31
Payer: COMMERCIAL

## 2025-02-04 ENCOUNTER — TREATMENT (OUTPATIENT)
Dept: PHYSICAL THERAPY | Facility: CLINIC | Age: 45
End: 2025-02-04
Payer: COMMERCIAL

## 2025-02-04 DIAGNOSIS — S83.242A ACUTE MEDIAL MENISCUS TEAR, LEFT, INITIAL ENCOUNTER: ICD-10-CM

## 2025-02-04 PROCEDURE — 97110 THERAPEUTIC EXERCISES: CPT | Mod: GP

## 2025-02-04 NOTE — PROGRESS NOTES
"Physical Therapy    Physical Therapy Treatment    Patient Name: Susan Ford  MRN: [id number]  Today's Date: 2/4/2025    Time Entry:   Time Calculation  Start Time: 1545  Stop Time: 1630  Time Calculation (min): 45 min     PT Therapeutic Procedures Time Entry  Therapeutic Exercise Time Entry: 45  Visit: 2    Assessment:  PT Assessment  Assessment Comment: The patient tolerated the exercises fairly well.  Plan:  OP PT Plan  Treatment/Interventions: Cryotherapy, Education/ Instruction, Electrical stimulation, Gait training, Hot pack, Manual therapy, Neuromuscular re-education, Therapeutic activities, Therapeutic exercises, Vasopneumatic device  PT Plan: Skilled PT  PT Frequency: 2 times per week  Duration: 10  Onset Date: 12/18/24  Certification Period Start Date: 01/14/25  Certification Period End Date: 04/14/25  Number of Treatments Authorized: Auth Required After 8 Visits  Rehab Potential: Excellent  Plan of Care Agreement: Patient    Current Problem  Problem List Items Addressed This Visit    None  Visit Diagnoses         Codes    Acute medial meniscus tear, left, initial encounter     S83.242A              Subjective:    General  Reason for Referral: left knee arthroscopy  Referred By: Enedina Hernandez MD  Preferred Learning Style: auditory, kinesthetic, verbal, visual, written  General Comment: Patient arrives ambulating with one crutch.    Objective     Treatments:  Therapeutic Exercise  Therapeutic Exercise Performed: Yes  Therapeutic Exercise Activity 1: hamstring stretch 3 x 30\"  Therapeutic Exercise Activity 2: calf stretch 3 x 30\"  Therapeutic Exercise Activity 3: quad sets 2 x 10 x 5\"  Therapeutic Exercise Activity 4: heel slides seated and lying 2 x 10 x 5\"  Therapeutic Exercise Activity 5: SAQ 2 x 10 x 3\"  Therapeutic Exercise Activity 6: seated SLR x 10  Therapeutic Exercise Activity 7: knee flexion stretch on step 2 x 10 x 5\"  Therapeutic Exercise Activity 8: patellar mobs      Goals:  Active "       PT Problem       Patient will report compliance with her home exercise program.         Start:  01/14/25    Expected End:  04/14/25            Patient will report improvement via the LEFS to demonstrate improved activity tolerance.        Start:  01/14/25    Expected End:  04/14/25            Patient will demonstrate improvements in left knee ROM to 0-0-120 to facilitate stairs.         Start:  01/14/25    Expected End:  04/14/25            Patient will demonstrate improvements in left knee strength to 5/5 to facilitate weight bearing activity.         Start:  01/14/25    Expected End:  04/14/25            Patient will ambulate with a normalized gait pattern with least restrictive assistive device.         Start:  01/14/25    Expected End:  04/14/25

## 2025-02-06 ENCOUNTER — DOCUMENTATION (OUTPATIENT)
Dept: PHYSICAL THERAPY | Facility: CLINIC | Age: 45
End: 2025-02-06
Payer: COMMERCIAL

## 2025-02-06 NOTE — PROGRESS NOTES
Physical Therapy                 Therapy Communication Note    Patient Name: Susan Ford  MRN: [id number]  Department: [place] [id number]  Room: Room/bed info not found  Today's Date: 2/6/2025     Discipline: Physical Therapy          Missed Visit Reason:      Missed Time: No Show    Comment:

## 2025-02-11 ENCOUNTER — APPOINTMENT (OUTPATIENT)
Dept: PHYSICAL THERAPY | Facility: CLINIC | Age: 45
End: 2025-02-11
Payer: COMMERCIAL

## 2025-02-13 ENCOUNTER — APPOINTMENT (OUTPATIENT)
Dept: PHYSICAL THERAPY | Facility: CLINIC | Age: 45
End: 2025-02-13
Payer: COMMERCIAL

## 2025-02-13 ENCOUNTER — DOCUMENTATION (OUTPATIENT)
Dept: PHYSICAL THERAPY | Facility: CLINIC | Age: 45
End: 2025-02-13
Payer: COMMERCIAL

## 2025-02-13 NOTE — PROGRESS NOTES
Physical Therapy                 Therapy Communication Note    Patient Name: Susan Ford  MRN: [id number]  Department:   Room: Room/bed info not found  Today's Date: 2/13/2025     Discipline: Physical Therapy          Missed Visit Reason:      Missed Time: No Show    Comment:

## 2025-02-14 ENCOUNTER — PATIENT OUTREACH (OUTPATIENT)
Dept: PRIMARY CARE | Facility: CLINIC | Age: 45
End: 2025-02-14
Payer: COMMERCIAL

## 2025-02-14 DIAGNOSIS — M06.09 RHEUMATOID ARTHRITIS OF MULTIPLE SITES WITHOUT RHEUMATOID FACTOR (MULTI): ICD-10-CM

## 2025-02-14 DIAGNOSIS — M19.90 OSTEOARTHRITIS, UNSPECIFIED OSTEOARTHRITIS TYPE, UNSPECIFIED SITE: ICD-10-CM

## 2025-02-14 NOTE — PROGRESS NOTES
Reviewed chart and outreach call placed to patient.  She admits that she is going through a lot physically and mentally therefore established care with a mental health counselor.  No medications prescribed at this time for depression and anxiety but they are discussing it.  She has cancelled most of her recent therapy sessions as due to this as well as feeling they are not following ortho's treatment plan.  Patient had her Symponi infusions increased to every 4 weeks but still has severe AM stiffness and pain that she feels it no longer is effective.  Her employer notified her that she had used up all her FMLA time and needed to return to work despite originally telling her she had until April.  She will be having elbow surgery in upcoming week as well.  Counselor will forward supportive documentation to extend her FMLA and hopefully mood will stabilize enough that she can resume her knee PT.  There are some financial and family issues she is struggling with as well but surrounded by good support system.  Patient has my number if any needs from PCP arise until next outreach.

## 2025-02-17 DIAGNOSIS — G89.29 OTHER CHRONIC PAIN: ICD-10-CM

## 2025-02-17 DIAGNOSIS — M06.09 POLYARTHRITIS WITH NEGATIVE RHEUMATOID FACTOR (MULTI): ICD-10-CM

## 2025-02-17 NOTE — TELEPHONE ENCOUNTER
PT CALLED, REQUESTING REFILL OF OXYCODONE 10/325 WHICH IS DUE IN 3 DAYS PER PT. PLEASE SEND TO [facility] IN [facility].

## 2025-02-18 ENCOUNTER — APPOINTMENT (OUTPATIENT)
Dept: PHYSICAL THERAPY | Facility: CLINIC | Age: 45
End: 2025-02-18
Payer: COMMERCIAL

## 2025-02-18 RX ORDER — OXYCODONE AND ACETAMINOPHEN 10; 325 MG/1; MG/1
1 TABLET ORAL EVERY 4 HOURS PRN
Qty: 120 TABLET | Refills: 0 | Status: SHIPPED | OUTPATIENT
Start: 2025-02-20

## 2025-02-18 NOTE — H&P
History Of Present Illness  Susan Ford is a 44 y.o.  right-hand-dominant female referred by Dr. Valentino for left lateral elbow pain that she is had for quite some time.  She is in previously diagnosed with lateral epicondylitis.  She has tried activity modifications and counterforce bracing without significant improvement.  Considering this and continued pain, she deferred discussed additional treatment options, specifically percutaneous tenotomy.       Past Medical History  She has a past medical history of Asthma, Cervicalgia, Chronic pain syndrome, Frequent falls, GERD (gastroesophageal reflux disease), Knee pain, right, Snoring, and UTI (urinary tract infection).    Surgical History  She has a past surgical history that includes Tubal ligation (11/21/2013); Hernia repair (11/21/2013); Appendectomy (11/21/2013); [place] tooth extraction; and Patellar tendon repair (Left).     Social History  She reports that she has never smoked. She has never used smokeless tobacco. She reports that she does not currently use alcohol. She reports that she does not use drugs.    Family History  Family History   Problem Relation Name Age of Onset    Stroke Father      Other (htn) Father      No Known Problems Brother          Allergies  Penicillin, Penicillins, Bee pollen, and House dust    Review of Systems  All other systems have been reviewed and are negative for complaint.       Physical Exam  General/Constitutional: No apparent distress. Well-nourished and well developed.  Head: Normocephalic, Atraumatic.   Eyes: EOMI.  Vascular: No edema, swelling or tenderness, except as noted in detailed exam.  Respiratory: Non-labored breathing.  Integumentary: No impressive skin lesions present, except as noted in detailed exam.  Neurological: Alert and oriented.  Psychological:  Normal mood and affect.  Musculoskeletal: Normal, except as noted in detailed exam.  Left elbow: No rash.  No deformity.  No edema.  Exquisite tenderness  over the lateral epicondyle.  Full AROM total planes.  Full muscle strength all planes.  Pain with resisted wrist extension and supination.  Negative valgus stress.  Negative varus stress.  Jack sensation intact throughout.  Median, ulnar, and radial nerves intact distally.       Last Recorded Vitals  There were no vitals taken for this visit.    Relevant Results  XR ELBOW LEFT 3+ VIEWS      INDICATION:  Signs/Symptoms:L elbow pain.      COMPARISON:  None      ACCESSION NUMBER(S):  [id number]      ORDERING CLINICIAN:  ROMELIA LANDIS      FINDINGS:  Tiny left triceps spur. No other osseous, articular, or soft tissue  abnormality.      IMPRESSION:  Tiny left triceps spur.      Signed by: You Bunch 3/30/2024 12:39 PM  Dictation workstation:   [id number]     Musculoskeletal Ultrasound:  Left lateral elbow:  Indication: Lateral elbow pain.  Findings: Common Extensor tendon intact with multiple regions of hypoechoic defects consistent with micro tearing.  There is enthesophyte production.  Impression: Chronic left elbow common extensor tendinopathy.        Assessment/Plan   Assessment & Plan  Lateral epicondylitis of left elbow      I discussed imaging and examination findings with patient.  I do agree that her symptoms are likely due to lateral epicondylitis and development of common extensor tendinopathy.  Considering she has pain despite previous conservative measures, I do feel that she would benefit from percutaneous tenotomy.  We discussed the procedure.  We discussed the risks versus benefits.  I provided her with additional information.  Patient returns today to proceed with left elbow common extensor tendon percutaneous tenotomy.         Ritchie Weber DO

## 2025-02-19 ENCOUNTER — HOSPITAL ENCOUNTER (OUTPATIENT)
Facility: CLINIC | Age: 45
Setting detail: OUTPATIENT SURGERY
Discharge: HOME | End: 2025-02-19
Attending: EMERGENCY MEDICINE | Admitting: EMERGENCY MEDICINE
Payer: COMMERCIAL

## 2025-02-19 VITALS
BODY MASS INDEX: 29.85 KG/M2 | HEIGHT: 64 IN | OXYGEN SATURATION: 100 % | DIASTOLIC BLOOD PRESSURE: 58 MMHG | RESPIRATION RATE: 20 BRPM | TEMPERATURE: 98.6 F | WEIGHT: 174.82 LBS | SYSTOLIC BLOOD PRESSURE: 114 MMHG | HEART RATE: 62 BPM

## 2025-02-19 DIAGNOSIS — M77.12 LATERAL EPICONDYLITIS OF LEFT ELBOW: Primary | ICD-10-CM

## 2025-02-19 PROCEDURE — 7100000010 HC PHASE TWO TIME - EACH INCREMENTAL 1 MINUTE: Performed by: EMERGENCY MEDICINE

## 2025-02-19 PROCEDURE — 2720000007 HC OR 272 NO HCPCS: Performed by: EMERGENCY MEDICINE

## 2025-02-19 PROCEDURE — 24357 REPAIR ELBOW PERC: CPT | Performed by: EMERGENCY MEDICINE

## 2025-02-19 PROCEDURE — 7100000009 HC PHASE TWO TIME - INITIAL BASE CHARGE: Performed by: EMERGENCY MEDICINE

## 2025-02-19 PROCEDURE — 3600000008 HC OR TIME - EACH INCREMENTAL 1 MINUTE - PROCEDURE LEVEL THREE: Performed by: EMERGENCY MEDICINE

## 2025-02-19 PROCEDURE — 3600000003 HC OR TIME - INITIAL BASE CHARGE - PROCEDURE LEVEL THREE: Performed by: EMERGENCY MEDICINE

## 2025-02-19 PROCEDURE — 76942 ECHO GUIDE FOR BIOPSY: CPT | Performed by: EMERGENCY MEDICINE

## 2025-02-19 PROCEDURE — 2500000004 HC RX 250 GENERAL PHARMACY W/ HCPCS (ALT 636 FOR OP/ED): Mod: SE | Performed by: EMERGENCY MEDICINE

## 2025-02-19 RX ORDER — LIDOCAINE HYDROCHLORIDE AND EPINEPHRINE 10; 10 UG/ML; MG/ML
INJECTION, SOLUTION INFILTRATION; PERINEURAL AS NEEDED
Status: DISCONTINUED | OUTPATIENT
Start: 2025-02-19 | End: 2025-02-19 | Stop reason: HOSPADM

## 2025-02-19 ASSESSMENT — PAIN SCALES - GENERAL
PAINLEVEL_OUTOF10: 6
PAINLEVEL_OUTOF10: 0 - NO PAIN

## 2025-02-19 ASSESSMENT — PAIN DESCRIPTION - DESCRIPTORS: DESCRIPTORS: ACHING

## 2025-02-19 ASSESSMENT — PAIN - FUNCTIONAL ASSESSMENT
PAIN_FUNCTIONAL_ASSESSMENT: 0-10
PAIN_FUNCTIONAL_ASSESSMENT: 0-10

## 2025-02-19 ASSESSMENT — COLUMBIA-SUICIDE SEVERITY RATING SCALE - C-SSRS
6. HAVE YOU EVER DONE ANYTHING, STARTED TO DO ANYTHING, OR PREPARED TO DO ANYTHING TO END YOUR LIFE?: NO
1. IN THE PAST MONTH, HAVE YOU WISHED YOU WERE DEAD OR WISHED YOU COULD GO TO SLEEP AND NOT WAKE UP?: NO
2. HAVE YOU ACTUALLY HAD ANY THOUGHTS OF KILLING YOURSELF?: NO

## 2025-02-19 NOTE — OP NOTE
Lt Lateral Elbow Tenex Procedure (L) Operative Note     Date: 2025  [place] [facility] [place] [facility]    Name: Susan Ford, : 1980, Age: 44 y.o., MRN: [id number], Sex: female    Diagnosis  Pre-op Diagnosis      * Lateral epicondylitis of left elbow [M77.12] Post-op Diagnosis     * Lateral epicondylitis of left elbow [M77.12]     Procedures  Lt Lateral Elbow Tenex Procedure  [id number] - [facility] TENOTOMY ELBOW LATERAL/MEDIAL PERCUTANEOUS      Surgeons      * Ritchie Weber - Primary    Resident/Fellow/Other Assistant:  Surgeons and Role:  * No surgeons found with a matching role *    Staff:   Circulator: Verna Perez Person: Kira    Anesthesia Staff: No anesthesia staff entered.    Procedure Summary  Anesthesia: Anesthesia type not filed in the log.  ASA: ASA status not filed in the log.  Estimated Blood Loss: 1mL  Intra-op Medications:   Administrations occurring from 0730 to 0815 on 25:   Medication Name Total Dose   lidocaine-epinephrine (Xylocaine W/EPI) 1 %-1:100,000 injection 11 mL         Intraprocedure I/O Totals       None           Specimen: No specimens collected              Drains and/or Catheters: * None in log *    Tourniquet Times:         Implants:     Findings: Left Elbow Common Extensor Tendinopathy    Indications: Susan Ford is an 44 y.o. female who is having surgery for Lateral epicondylitis of left elbow [M77.12].     The patient was seen in the preoperative area. The risks, benefits, complications, treatment options, non-operative alternatives, expected recovery and outcomes were discussed with the patient. The possibilities of reaction to medication, pulmonary aspiration, injury to surrounding structures, bleeding, recurrent infection, the need for additional procedures, failure to diagnose a condition, and creating a complication requiring transfusion or operation were discussed with the patient. The patient concurred with the proposed plan, giving informed consent.   The site of surgery was properly noted/marked if necessary per policy. The patient has been actively warmed in preoperative area. Preoperative antibiotics are not indicated. Venous thrombosis prophylaxis are not indicated.    Procedure Details:     PROCEDURE PERFORMED:  Percutaneous tenotomy of the left common extensor tendon using ultrasound guidance to cut and remove the patient’s pathologic tissue, tendon or fascia.   DESCRIPTION OF PROCEDURE:   A sterile sleeve was placed over the ultrasound transducer. The anatomy was identified and the diseased tissue was visualized or confirmed at the common extensor tendon near the insertion. The area was prepped with an antimicrobial solution and the area was injected with [11]ml of 1% Lidocaine with Epinephrine using a 25 gauge needle. A skin wheal was placed and the involved tissue was anesthetized. A #11 blade was used to incise through the skin wheal about 1.5 cm distal to the site of maximum tenderness. The blade continued through the subcutaneous tissue and incised the tendon/fascia.  To section the common extensor tendon the surgical instrument is introduced through the incision advancing to the diseased tendon. Once the tip of instrument confirmed the pathologic tissue, the foot pedal was depressed and the tendon is incised along its length cutting and removing the diseased tendon tissue. Total tenotomy time [1 minute and 39 seconds].  Following the procedure, steri-strips were placed, followed by Tegaderm and an ace wrap. Patient was then placed in a wrist splint.  Post-op instructions given: Keep dressing clean and dry over the next week, take Oxycodone 5mg tablet every 8 hours as needed for pain, remove ace wrap in 2 hours, follow-up in my office in 1 week.   Complications:  None; patient tolerated the procedure well.    Disposition: PACU - hemodynamically stable.  Condition: stable           Ritchie Weber  Phone Number: [phone]

## 2025-02-19 NOTE — DISCHARGE INSTRUCTIONS
Post Procedure Instructions for Tenex or TenJet    Keep bandages and procedure area clean and dry for 7 days.   2.  Avoid submerging area in water (example, swimming/bathing) for 7 days.  3. May apply ice for 20 minutes as needed for discomfort.  4. May take over the counter pain medication or prescriptions, if provided.  5. Please make sure to follow up within one week or as directed by Dr. Weber.  6 Call [phone] (daytime) or [phone] (night/weekends) if you have questions or concerns.  7. E-mail [email or web address]    Please contact office if:  Area becomes red or hot to touch   Fever of 100 degrees or more  Increased pain or swelling  Drainage from treatment site    SPECIFIC PATIENT INSTRUCTIONS    ELBOW  Rest arm and hand today  May resume non-repetitive sedentary use of arm/hand in 3 days  Light daily activity for 2 weeks, then progress as tolerated  May begin stretching and eccentric exercise at 2 weeks  No lifting objects with arm/hand  greater than 5 pounds for 2 weeks  May gradually resume normal use of arm/hand at 6 weeks as tolerated and subject to physician approval

## 2025-02-20 DIAGNOSIS — M06.09 POLYARTHRITIS WITH NEGATIVE RHEUMATOID FACTOR (MULTI): ICD-10-CM

## 2025-02-20 RX ORDER — PREDNISONE 10 MG/1
10 TABLET ORAL DAILY
Qty: 30 TABLET | Refills: 1 | Status: SHIPPED | OUTPATIENT
Start: 2025-02-20

## 2025-02-20 ASSESSMENT — PAIN SCALES - GENERAL: PAINLEVEL_OUTOF10: 4

## 2025-02-24 ENCOUNTER — DOCUMENTATION (OUTPATIENT)
Dept: PHYSICAL THERAPY | Facility: CLINIC | Age: 45
End: 2025-02-24
Payer: COMMERCIAL

## 2025-02-24 NOTE — PROGRESS NOTES
Physical Therapy                 Therapy Communication Note    Patient Name: Susan Ford  MRN: [id number]  Department:   Room: Room/bed info not found  Today's Date: 2/24/2025     Discipline: Physical Therapy          Missed Visit Reason:      Missed Time: No Show    Comment:

## 2025-02-26 ENCOUNTER — APPOINTMENT (OUTPATIENT)
Dept: ORTHOPEDIC SURGERY | Facility: HOSPITAL | Age: 45
End: 2025-02-26
Payer: COMMERCIAL

## 2025-02-26 DIAGNOSIS — G89.29 OTHER CHRONIC PAIN: ICD-10-CM

## 2025-02-26 DIAGNOSIS — M77.12 LEFT LATERAL EPICONDYLITIS: ICD-10-CM

## 2025-02-26 DIAGNOSIS — M06.09 POLYARTHRITIS WITH NEGATIVE RHEUMATOID FACTOR (MULTI): ICD-10-CM

## 2025-02-26 PROCEDURE — 99024 POSTOP FOLLOW-UP VISIT: CPT | Performed by: EMERGENCY MEDICINE

## 2025-02-26 NOTE — PROGRESS NOTES
History of Present Illness:  Date of Surgery: 12/18/24 diagnostic left knee arthroscopy with limited synovectomy and removal of ACL/cyclops lesion and open proximal patellofemoral realignment with VMO advancement. She is doing well. She does continue to have some instability. She notes that most of her pain is from her incision.     Exam:  Mild effusion  Incision is healed  Flexion to 100  Full passive motion  No calf tenderness   Negative Geraldo's test  Distal neurovascular exam intact    Assessment:  S/p diagnostic left knee arthroscopy with limited synovectomy and removal of ACL/cyclops lesion and open proximal patellofemoral realignment with VMO advancement    Plan:  Continue working with therapy. She has missed a few visits but she is going to try to do some on her own.   Follow-up in 2 months no x-rays needed    Scribe Attestation  By signing my name below, Leela MAHMOOD, Scriblex   attest that this documentation has been prepared under the direction and in the presence of Hayden Goldman MD.

## 2025-02-27 ENCOUNTER — OFFICE VISIT (OUTPATIENT)
Dept: ORTHOPEDIC SURGERY | Facility: CLINIC | Age: 45
End: 2025-02-27
Payer: COMMERCIAL

## 2025-02-27 DIAGNOSIS — Z98.890 S/P LEFT KNEE ARTHROSCOPY: Primary | ICD-10-CM

## 2025-02-27 PROCEDURE — 1036F TOBACCO NON-USER: CPT | Performed by: ORTHOPAEDIC SURGERY

## 2025-02-27 PROCEDURE — 99024 POSTOP FOLLOW-UP VISIT: CPT | Performed by: ORTHOPAEDIC SURGERY

## 2025-02-27 PROCEDURE — 99211 OFF/OP EST MAY X REQ PHY/QHP: CPT | Performed by: ORTHOPAEDIC SURGERY

## 2025-02-28 ENCOUNTER — APPOINTMENT (OUTPATIENT)
Dept: INFUSION THERAPY | Facility: CLINIC | Age: 45
End: 2025-02-28
Payer: COMMERCIAL

## 2025-02-28 VITALS
DIASTOLIC BLOOD PRESSURE: 66 MMHG | SYSTOLIC BLOOD PRESSURE: 121 MMHG | RESPIRATION RATE: 16 BRPM | OXYGEN SATURATION: 98 % | TEMPERATURE: 98 F | HEART RATE: 67 BPM | WEIGHT: 171.6 LBS | BODY MASS INDEX: 29.46 KG/M2

## 2025-02-28 DIAGNOSIS — M06.09 POLYARTHRITIS WITH NEGATIVE RHEUMATOID FACTOR (MULTI): ICD-10-CM

## 2025-02-28 RX ORDER — TRAMADOL HYDROCHLORIDE 50 MG/1
50 TABLET ORAL 2 TIMES DAILY
Qty: 60 TABLET | Refills: 0 | Status: SHIPPED | OUTPATIENT
Start: 2025-02-28

## 2025-02-28 RX ORDER — KETOROLAC TROMETHAMINE 30 MG/ML
30 INJECTION, SOLUTION INTRAMUSCULAR; INTRAVENOUS ONCE
OUTPATIENT
Start: 2025-03-27 | End: 2025-03-27

## 2025-02-28 RX ORDER — DIPHENHYDRAMINE HYDROCHLORIDE 50 MG/ML
50 INJECTION INTRAMUSCULAR; INTRAVENOUS AS NEEDED
OUTPATIENT
Start: 2025-03-27

## 2025-02-28 RX ORDER — EPINEPHRINE 0.3 MG/.3ML
0.3 INJECTION SUBCUTANEOUS EVERY 5 MIN PRN
OUTPATIENT
Start: 2025-03-27

## 2025-02-28 RX ORDER — ALBUTEROL SULFATE 0.83 MG/ML
3 SOLUTION RESPIRATORY (INHALATION) AS NEEDED
OUTPATIENT
Start: 2025-03-27

## 2025-02-28 RX ORDER — KETOROLAC TROMETHAMINE 30 MG/ML
30 INJECTION, SOLUTION INTRAMUSCULAR; INTRAVENOUS ONCE
Status: COMPLETED | OUTPATIENT
Start: 2025-02-28 | End: 2025-02-28

## 2025-02-28 RX ORDER — FAMOTIDINE 10 MG/ML
20 INJECTION, SOLUTION INTRAVENOUS ONCE AS NEEDED
OUTPATIENT
Start: 2025-03-27

## 2025-02-28 RX ADMIN — KETOROLAC TROMETHAMINE 30 MG: 30 INJECTION, SOLUTION INTRAMUSCULAR; INTRAVENOUS at 13:18

## 2025-02-28 ASSESSMENT — ENCOUNTER SYMPTOMS
HEMATURIA: 0
UNEXPECTED WEIGHT CHANGE: 0
EYE PROBLEMS: 0
TROUBLE SWALLOWING: 0
MYALGIAS: 0
WHEEZING: 0
EXTREMITY WEAKNESS: 1
PALPITATIONS: 0
SHORTNESS OF BREATH: 1
DYSURIA: 0
CHILLS: 0
VOICE CHANGE: 0
HEADACHES: 0
DIZZINESS: 0
NAUSEA: 0
WOUND: 0
COUGH: 0
FEVER: 0
ABDOMINAL PAIN: 0
APPETITE CHANGE: 0
DIARRHEA: 0
FATIGUE: 0
VOMITING: 0
SORE THROAT: 0
CONSTIPATION: 0
LIGHT-HEADEDNESS: 0
FREQUENCY: 0
ARTHRALGIAS: 0
NUMBNESS: 0
LEG SWELLING: 0
BLOOD IN STOOL: 0

## 2025-02-28 ASSESSMENT — PAIN SCALES - GENERAL: PAINLEVEL_OUTOF10: 7

## 2025-02-28 NOTE — PROGRESS NOTES
Patient follows up today virtual via telephone call evaluation status post percutaneous tenotomy.  Date of procedure 2/19/25. Patient has sent images of her wound via [employer]. Wound is well-appearing without signs of infection.  At this point, patient can progress to physical therapy.  We have provided them with a referral for this today.  Current restrictions are no soft tissue manipulation and no weight resistance greater than 5lbs. Patient states her understanding. Patient will follow-up in 5 weeks for reevaluation.  If doing well at that time, they will be cleared for all activities without restriction.

## 2025-02-28 NOTE — PROGRESS NOTES
[facility]   Infusion Clinic Note   Date: 2025   Name: Susan Ford  : 1980   MRN: [id number]         Reason for Visit: OP Infusion (Simponi 28 days)         Today: We administered methylPREDNISolone sod succinate, ketorolac, and golimumab (Simponi Aria) 150 mg in sodium chloride 0.9% 100 mL IV.       Ordered By: Enedina Clements,*       For a Diagnosis of: Polyarthritis with negative rheumatoid factor (Multi)       At today's visit patient accompanied by:       Vitals:   Vitals:    25 1253 25 1400   BP: 126/50 121/66   Pulse: 71 67   Resp: 18 16   Temp: 36.8 °C (98.2 °F) 36.7 °C (98 °F)   SpO2: 94% 98%   Weight: 77.8 kg (171 lb 9.6 oz)    PainSc:   7    PainLoc: Generalized              Pre - Treatment Checklist:      - Previous reaction to current treatment: no      Assess patient for the concerns below. Document provider notification as appropriate.  - Active or recent infection with/without current antibiotic use: no  - Recent or planned invasive dental work: no  - Recent or planned surgeries: no  - Recently received or plans to receive vaccinations: no  - Has treatment related toxicities: no  - Any chance may be pregnant:  no      Pain: 7   - Is the pain different from normal: no   - Is prescribing Doctor aware:  yes      Labs: N/A      Fall Risk Screening:         Review Of Systems:  Review of Systems   Constitutional:  Negative for appetite change, chills, fatigue, fever and unexpected weight change.   HENT:   Negative for hearing loss, mouth sores, sore throat, tinnitus, trouble swallowing and voice change.    Eyes:  Negative for eye problems.   Respiratory:  Positive for shortness of breath. Negative for cough and wheezing.         SOB on exertion at times. Pt has asthma and is due for Dupixent injection.   Cardiovascular:  Negative for chest pain, leg swelling and palpitations.   Gastrointestinal:  Negative for abdominal pain, blood  in stool, constipation, diarrhea, nausea and vomiting.   Genitourinary:  Negative for dysuria, frequency and hematuria.    Musculoskeletal:  Negative for arthralgias and myalgias.   Skin:  Negative for itching, rash and wound.   Neurological:  Positive for extremity weakness. Negative for dizziness, headaches, light-headedness and numbness.        Left elbow tenotomy 10 days ago, pt states elbow is sore, activity is limited-small well healed incision noted.         Infusion Readiness:  - Assessment Concerns Related to Infusion: No  - Provider notified: n/a      New Patient Education:    N/A (returning patient for continuation of therapy. Ongoing education provided as needed.)        Treatment Conditions & Drug Specific Questions:    Golimumab  (SIMPONI)    (Unless otherwise specified on patient specific therapy plan):     TREATMENT CONDITIONS:  Unless otherwise specified on patient specific thearpy plan HOLD and notify provider prior to proceeding with today's infusion if patient with:  o Positive T-Spot  o Positive Hepatitis B Surface Ag / Hepatitis C     Lab Results   Component Value Date    TBGRES Negative  Reference range: NEGATIVE   10/14/2020    TSPOTR  09/12/2023     Negative  Reference range: Normal Value: Negative    A negative test result does not exclude the possibility of exposure  to   or infection with Mycobacterium tuberculosis (M. tuberculosis).    Patients with recent exposure to TB infected individuals exhibiting a   negative T-SPOT.TB result should be considered for retesting within 6   weeks or if other relevant clinical symptoms indicate.  Results from   T-SPOT.TB testing must be used in conjunction with each individual's   epidemiological history, current medical status, and results of other   diagnostic evaluations.  The T-SPOT.TB test is qualitative and  results   are reported as positive, borderline or negative, given that the test   controls perform as expected. In line with the Centers for  "Disease   Control and Prevention's 2010 recommendation to report quantitative   measurements alongside the qualitative result, the laboratory  provides   spot counts for informational purposes only.  The T-SPOT.TB test  should   not be interpreted as a quantitative test.  Test performed at:  [facility] [address] [place] [id number]        Lab Results   Component Value Date    HEPBSAG NEGATIVE 10/14/2020      No results found for: \"NONUHFIRE\", \"NONUHSWGH\", \"NONUHFISH\", \"EXTHEPBSAG\"  Lab Results   Component Value Date    HEPCAB  10/14/2020     Negative  Reference range: NEGATIVE  Performed at the [facility] unless   otherwise noted.        Lab Results   Component Value Date    HEPCAB  10/14/2020     Negative  Reference range: NEGATIVE  Performed at the [facility] unless   otherwise noted.       No results found for: \"HBCTI\", \"HEPBCAB\"    Lab Results   Component Value Date    WBC 7.4 12/02/2024    HGB 13.3 12/02/2024    HCT 42.8 12/02/2024    MCV 85 12/02/2024     12/02/2024        Patient meets treatment conditions? Yes    DRUG SPECIFIC QUESTIONS:   - Up to date on all immunizations per patient report? Yes    Available Immunization Records:  Immunization History   Administered Date(s) Administered    Flu vaccine (IIV4), preservative free *Check age/dose* 09/17/2020    Influenza, injectable, quadrivalent 10/14/2020    PPD Test 02/24/2012, 03/02/2012, 09/20/2019    Pneumococcal conjugate vaccine, 20-valent (PREVNAR 20) 06/21/2023    Td (adult) 02/26/2011    Tdap vaccine, age 7 year and older (BOOSTRIX, ADACEL) 09/17/2020, 11/10/2020         - Any history of or new or worsening s/s of heart failure which may include dyspnea, edema? No  (If worsening s/s notify provider prior to proceeding. Simponi may cause exacerbation of heart failure)     - Any new diagnosis of cancer, especially lymphomas? No    (Box Warning: Malignancy. If YES notify prescribing " provider prior to proceeding )      REMINDER:  WEIGHT BASED DRUG    Recommended Vitals/Observation:  Vitals: Take vital signs prior to starting infusion, at infusion conclusion and as needed.   Observation: No observation.        Weight Based Drug Calculations:    WEIGHT BASED DRUGS: Golimumab (SIMPONI)   Patient's dosing weight (kg): 74.8     10% weight variance for prescribed treatment: 67.32 kg to 82.28 kg     Patient's weight today:   Vitals:    02/28/25 1253   Weight: 77.8 kg (171 lb 9.6 oz)         weight range for prescribed dose:     Patient weight today falls outside of 10% variance or  weight range: No     Home Care pharmacist informed of weight variance: Not applicable    Doses that are weight based have an acceptable variance rule within 10% of the prescribed   order and/or within  weight range. If patient weight on day of infusion falls   outside of the 10% variance, or weight range, infusion is administered and   pharmacy contacted regarding future dosing adjustments, per policy.      Post Treatment: Patient tolerated treatment without issue and was discharged in no apparent distress.      Note Authored / Patient Cared for By: Rola Rojas RN   _________________________________________________________________________    Note authored and patient cared for by: Rola Rojas RN  Note/Encounter reviewed by: Brook CHENG NP. This provider on site at time of patient infusion. Infusion staff to notify this provider of any questions, concerns, abnormals or issues during infusion.    Final check of medication completed by this PROSPER / or on-site pharmacist with administering nurse using positive identification prior to administration. Final appearance of product checked for accuracy and conformity to the formula of the prepared product. Assured use of correct ingredients, accurate calculations and precise measurements under appropriate conditions and  procedures.    No issues reported during today's encounter. Pt. tolerated infusion without difficulty. Pt. not independently evaluated by this provider during today's encounter.  (Brook CHENG NP)

## 2025-02-28 NOTE — PATIENT INSTRUCTIONS
Today :We administered methylPREDNISolone sod succinate, ketorolac, and golimumab (Simponi Aria) 150 mg in sodium chloride 0.9% 100 mL IV.     For:   1. 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     (Tell all doctors including dentists that you are taking this medication)     Go to the emergency room or call 911 if:  -You have signs of allergic reaction:   -Rash, hives, itching.   -Swollen, blistered, peeling skin.   -Swelling of face, lips, mouth, tongue or throat.   -Tightness of chest, trouble breathing, swallowing or talking     Call your doctor:  - If IV / injection site gets red, warm, swollen, itchy or leaks fluid or pus.     (Leave dressing on your IV site for at least 2 hours and keep area clean and dry  - If you get sick or have symptoms of infection or are not feeling well for any reason.    (Wash your hands often, stay away from people who are sick)  - If you have side effects from your medication that do not go away or are bothersome.     (Refer to the teaching your nurse gave you for side effects to call your doctor about)    - Common side effects may include:  stuffy nose, headache, feeling tired, muscle aches, upset stomach  - Before receiving any vaccines     - Call the [facility] at   If:  - You get sick, are on antibiotics, have had a recent vaccine, have surgery or dental work and your doctor wants your visit rescheduled.  - You need to cancel and reschedule your visit for any reason. Call at least 2 days before your visit if you need to cancel.   - Your insurance changes before your next visit.    (We will need to get approval from your new insurance. This can take up to two weeks.)     The [facility] is opened Monday thru Friday. We are closed on weekends and holidays.   Voice mail will take your call if  the center is closed. If you leave a message please allow 24 hours for a call back during weekdays. If you leave a message on a weekend/holiday, we will call you back the next business day.    A pharmacist is available Monday - Friday from 8:30AM to 3:30PM to help answer any questions you may have about your prescriptions(s). Please call pharmacy at:    [facility]: [phone]  [facility]: [phone]  [facility]: [phone]

## 2025-03-07 ENCOUNTER — PATIENT OUTREACH (OUTPATIENT)
Dept: PRIMARY CARE | Facility: CLINIC | Age: 45
End: 2025-03-07
Payer: COMMERCIAL

## 2025-03-07 DIAGNOSIS — M06.09 RHEUMATOID ARTHRITIS OF MULTIPLE SITES WITHOUT RHEUMATOID FACTOR (MULTI): ICD-10-CM

## 2025-03-07 DIAGNOSIS — M19.90 OSTEOARTHRITIS, UNSPECIFIED OSTEOARTHRITIS TYPE, UNSPECIFIED SITE: ICD-10-CM

## 2025-03-07 NOTE — PROGRESS NOTES
Chart review completed, out reach call attempted to patient's cell phone number.  Left my contact information and requested callback at patient's convenience

## 2025-03-13 ENCOUNTER — PATIENT OUTREACH (OUTPATIENT)
Dept: PRIMARY CARE | Facility: CLINIC | Age: 45
End: 2025-03-13
Payer: COMMERCIAL

## 2025-03-13 DIAGNOSIS — M19.90 OSTEOARTHRITIS, UNSPECIFIED OSTEOARTHRITIS TYPE, UNSPECIFIED SITE: ICD-10-CM

## 2025-03-13 DIAGNOSIS — M06.09 RHEUMATOID ARTHRITIS OF MULTIPLE SITES WITHOUT RHEUMATOID FACTOR (MULTI): ICD-10-CM

## 2025-03-13 NOTE — PROGRESS NOTES
Second outreach attempt made to contact patient.  She answers to tell me she just completed telephone appointment with her counselor who is treating her for anxiety and depression.  She states she had no idea how much personal stress she has been under and is now considering starting medications.  She was not feeling well and visited urgent care today and diagnosed with a UTI so we will be starting antibiotics first.  Patient had another incoming call so our conversation was cut short and I was unable to ask if she has found Symponi alternative and provide reminder for mammogram next month

## 2025-03-17 ENCOUNTER — TRANSCRIBE ORDERS (OUTPATIENT)
Dept: RHEUMATOLOGY | Facility: CLINIC | Age: 45
End: 2025-03-17
Payer: COMMERCIAL

## 2025-03-17 DIAGNOSIS — E55.9 VITAMIN D DEFICIENCY: ICD-10-CM

## 2025-03-17 DIAGNOSIS — M06.09 POLYARTHRITIS WITH NEGATIVE RHEUMATOID FACTOR (MULTI): ICD-10-CM

## 2025-03-17 DIAGNOSIS — G89.29 OTHER CHRONIC PAIN: ICD-10-CM

## 2025-03-17 NOTE — TELEPHONE ENCOUNTER
PT CALLED TO REQUEST REFILL OF OXYCODONE 10 MG. STATES YOU MAY NEED TO TITRATE UP BECAUSE PAIN COMES BACK SOONER NOW. MADE APPT FOR NEXT WEEK.    PT INFORMED THAT SHE WILL NEED TO SEE MD EVERY 6 MONTHS TO CONTINUE TO RECEIVE MEDICATION

## 2025-03-18 RX ORDER — OXYCODONE AND ACETAMINOPHEN 10; 325 MG/1; MG/1
1 TABLET ORAL EVERY 4 HOURS PRN
Qty: 120 TABLET | Refills: 0 | Status: SHIPPED | OUTPATIENT
Start: 2025-03-18

## 2025-03-26 ENCOUNTER — OFFICE VISIT (OUTPATIENT)
Dept: RHEUMATOLOGY | Facility: CLINIC | Age: 45
End: 2025-03-26
Payer: COMMERCIAL

## 2025-03-26 VITALS
RESPIRATION RATE: 16 BRPM | OXYGEN SATURATION: 98 % | HEIGHT: 64 IN | BODY MASS INDEX: 29.19 KG/M2 | SYSTOLIC BLOOD PRESSURE: 132 MMHG | HEART RATE: 60 BPM | DIASTOLIC BLOOD PRESSURE: 64 MMHG | TEMPERATURE: 96.6 F | WEIGHT: 171 LBS

## 2025-03-26 DIAGNOSIS — E55.9 VITAMIN D DEFICIENCY: ICD-10-CM

## 2025-03-26 DIAGNOSIS — Z79.899 ENCOUNTER FOR LONG-TERM (CURRENT) USE OF MEDICATIONS: ICD-10-CM

## 2025-03-26 DIAGNOSIS — M06.09 POLYARTHRITIS WITH NEGATIVE RHEUMATOID FACTOR (MULTI): Primary | ICD-10-CM

## 2025-03-26 DIAGNOSIS — G89.29 OTHER CHRONIC PAIN: ICD-10-CM

## 2025-03-26 PROCEDURE — 99214 OFFICE O/P EST MOD 30 MIN: CPT | Performed by: INTERNAL MEDICINE

## 2025-03-26 PROCEDURE — 3008F BODY MASS INDEX DOCD: CPT | Performed by: INTERNAL MEDICINE

## 2025-03-26 RX ORDER — ERGOCALCIFEROL 1.25 MG/1
50000 CAPSULE ORAL
Qty: 12 CAPSULE | Refills: 1 | Status: SHIPPED | OUTPATIENT
Start: 2025-03-30 | End: 2025-03-26 | Stop reason: SDUPTHER

## 2025-03-26 ASSESSMENT — ROUTINE ASSESSMENT OF PATIENT INDEX DATA (RAPID3)
IN_OUT_TRANSPORT: WITH MUCH DIFFICULTY
TOTAL RAPID3 SCORE: 23.7
ON A SCALE OF ONE TO TEN, CONSIDERING ALL THE WAYS IN WHICH ILLNESS AND HEALTH CONDITIONS MAY AFFECT YOU AT THIS TIME, PLEASE INDICATE BELOW HOW YOU ARE DOING:: 8.5
WASH_DRY_BODY: WITH MUCH DIFFICULTY
TURN_FAUCETS_OFF: WITH MUCH DIFFICULTY
SEVERITY_SCORE: 0
PICK_CLOTHES_OFF_FLOOR: WITH MUCH DIFFICULTY
FEELINGS_DEPRESSION: WITH MUCH DIFFICULTY
WEIGHTED_TOTAL_SCORE: 7.9
LIFT_CUP_TO_MOUTH: WITH MUCH DIFFICULTY
SEVERITY_SCORE: HIGH SEVERITY (HS)
ON A SCALE OF ONE TO TEN, CONSIDERING ALL THE WAYS IN WHICH ILLNESS AND HEALTH CONDITIONS MAY AFFECT YOU AT THIS TIME, PLEASE INDICATE BELOW HOW YOU ARE DOING:: 8.5
PARTIPATE_RECREATIONAL_ACTIVITIES: WITH MUCH DIFFICULTY
GOOD_NIGHTS_SLEEP: WITH MUCH DIFFICULTY
SUM OF QUESTIONS A TO J: 20
ON A SCALE OF ONE TO TEN, HOW MUCH PAIN HAVE YOU HAD BECAUSE OF YOUR CONDITION OVER THE PAST WEEK?: 8.5
WALK_KILOMETERS: WITH MUCH DIFFICULTY
FN_SCORE: 6.7
FEELINGS_ANXIETY_NERVOUS: WITH MUCH DIFFICULTY
WALK_FLAT_GROUND: WITH MUCH DIFFICULTY
ON A SCALE OF ONE TO TEN, HOW MUCH PAIN HAVE YOU HAD BECAUSE OF YOUR CONDITION OVER THE PAST WEEK?: 8.5
IN_OUT_BED: WITH MUCH DIFFICULTY
DRESS_YOURSELF: WITH MUCH DIFFICULTY

## 2025-03-26 ASSESSMENT — PATIENT HEALTH QUESTIONNAIRE - PHQ9
SUM OF ALL RESPONSES TO PHQ9 QUESTIONS 1 AND 2: 0
2. FEELING DOWN, DEPRESSED OR HOPELESS: NOT AT ALL
1. LITTLE INTEREST OR PLEASURE IN DOING THINGS: NOT AT ALL

## 2025-03-26 ASSESSMENT — COLUMBIA-SUICIDE SEVERITY RATING SCALE - C-SSRS
1. IN THE PAST MONTH, HAVE YOU WISHED YOU WERE DEAD OR WISHED YOU COULD GO TO SLEEP AND NOT WAKE UP?: NO
6. HAVE YOU EVER DONE ANYTHING, STARTED TO DO ANYTHING, OR PREPARED TO DO ANYTHING TO END YOUR LIFE?: NO
2. HAVE YOU ACTUALLY HAD ANY THOUGHTS OF KILLING YOURSELF?: NO

## 2025-03-26 ASSESSMENT — PAIN SCALES - GENERAL: PAINLEVEL_OUTOF10: 8

## 2025-03-26 NOTE — PROGRESS NOTES
[facility]/  Rheumatology  [address], [address]  [place] [address]  Phone: [phone]  Fax: [phone]    Rheumatology Progress Note 03/26/2025      Susan Ford is a 44 y.o. female here for follow up.    Last Visit: 10/16/24    Rheum Hx      Previous Tx    Health Maintenance  DXA- none  Malignancy Hx- none  Immunization History   Administered Date(s) Administered    Flu vaccine (IIV4), preservative free *Check age/dose* 09/17/2020    Influenza, injectable, quadrivalent 10/14/2020    PPD Test 02/24/2012, 03/02/2012, 09/20/2019    Pneumococcal conjugate vaccine, 20-valent (PREVNAR 20) 06/21/2023    Td (adult) 02/26/2011    Tdap vaccine, age 7 year and older (BOOSTRIX, ADACEL) 09/17/2020, 11/10/2020          Past Medical History:   Diagnosis Date    Asthma     Cervicalgia     Chronic pain syndrome     Frequent falls     GERD (gastroesophageal reflux disease)     Knee pain, right     Snoring     Snoring    UTI (urinary tract infection)       Past Surgical History:   Procedure Laterality Date    APPENDECTOMY  11/21/2013    Appendectomy    HERNIA REPAIR  11/21/2013    Hernia Repair    PATELLAR TENDON REPAIR Left     TUBAL LIGATION  11/21/2013    Tubal Ligation    WISDOM TOOTH EXTRACTION        Current Outpatient Medications   Medication Sig Dispense Refill    albuterol (Ventolin HFA) 90 mcg/actuation inhaler Inhale 1 puff every 4 hours if needed for wheezing (inhale 1-2 puffs every 4 hours as needed.). 18 g 2    albuterol 2.5 mg /3 mL (0.083 %) nebulizer solution Take 3 mL (2.5 mg) by nebulization every 6 hours if needed for wheezing.      benzonatate (Tessalon) 100 mg capsule Take 1 capsule (100 mg) by mouth 3 times a day as needed for cough. Do not crush or chew.      cyclobenzaprine (Flexeril) 10 mg tablet Take 1 tablet (10 mg) by mouth if needed for muscle spasms. 90 tablet 3    dupilumab (Dupixent Pen) 300 mg/2 mL injection Inject 2 mL (300 mg) under the skin every  14 (fourteen) days.      EPINEPHrine (EpiPen 2-Milan) 0.3 mg/0.3 mL injection syringe as directed Injection as directed for 1 days      [START ON 3/30/2025] ergocalciferol (Vitamin D-2) 1250 mcg (50,000 units) capsule Take 1 capsule (50,000 Units) by mouth 1 (one) time per week. 12 capsule 1    fexofenadine (Allegra) 180 mg tablet       fluticasone (Flonase) 50 mcg/actuation nasal spray Administer 1 spray into each nostril once daily. Shake gently. Before first use, prime pump. After use, clean tip and replace cap.      fluticasone-umeclidin-vilanter (Trelegy Ellipta) 200-62.5-25 mcg blister with device Inhale 1 puff once daily.      folic acid (Folvite) 1 mg tablet Take 1 tablet (1 mg) by mouth once daily. 90 tablet 3    lidocaine (Lidoderm) 5 % patch Place 1 patch over 12 hours on the skin once daily. Remove & discard patch within 12 hours or as directed by MD. 30 patch 11    meclizine (Antivert) 25 mg tablet Take 1 tablet (25 mg) by mouth 3 times a day as needed for dizziness. 90 tablet 3    mometasone-formoterol (Dulera) 200-5 mcg/actuation inhaler Inhale 2 puffs twice a day.      naloxone (Narcan) 4 mg/0.1 mL nasal spray Administer 1 spray (4 mg) into affected nostril(s) if needed for opioid reversal. May repeat in 2-3 minutes if needed, alternating nostrils 2 each 0    nebulizer accessories misc Use as directed with nebulizer machine. 1 each 0    oxyCODONE-acetaminophen (Percocet)  mg tablet Take 1 tablet by mouth every 6 hours if needed for severe pain (7 - 10). 120 tablet 0    oxyCODONE-acetaminophen (Percocet)  mg tablet Take 1 tablet by mouth every 4 hours if needed for severe pain (7 - 10). 120 tablet 0    predniSONE (Deltasone) 10 mg tablet TAKE ONE TABLET BY MOUTH ONCE DAILY 30 tablet 1    sulfaSALAzine (Azulfidine) 500 mg DR tablet Take 1 tablet (500 mg) by mouth 2 times a day. Do not crush, chew, or split. 180 tablet 3    traMADol (Ultram) 50 mg tablet Take 1 tablet (50 mg) by mouth every 6  "hours if needed for severe pain (7 - 10). 120 tablet 3     No current facility-administered medications for this visit.      Allergies   Allergen Reactions    Penicillin Shortness of breath    Penicillins Shortness of breath, Anaphylaxis and Swelling     Reaction was when patient was a child. Pts mother told her about it.    Bee Pollen Unknown    House Dust Unknown        Vitals:    03/26/25 1428   BP: 132/64   BP Location: Left arm   Patient Position: Sitting   BP Cuff Size: Adult long   Pulse: 60   Resp: 16   Temp: 35.9 °C (96.6 °F)   TempSrc: Temporal   SpO2: 98%   Weight: 77.6 kg (171 lb)   Height: 1.626 m (5' 4\")             Rapid 3  Function Score (FN): 6.7  Pain Score (PN) (0-10): 8.5  Patient Global (PTGL) (0-10): 8.5  Rapid3 Score: 23.7  RAPID3 Weighted Score: 7.9     Workup    Component      Latest Ref [facility] 3/25/2025   WHITE BLOOD CELL COUNT      3.8 - 10.8 Thousand/uL 5.8    RED BLOOD CELL COUNT      3.80 - 5.10 Million/uL 4.77    HEMOGLOBIN      11.7 - 15.5 g/dL 13.0    HEMATOCRIT      35.0 - 45.0 % 40.3    MCV      80.0 - 100.0 fL 84.5    MCH      27.0 - 33.0 pg 27.3    MCHC      32.0 - 36.0 g/dL 32.3    RDW      11.0 - 15.0 % 12.6    PLATELET COUNT      140 - 400 Thousand/uL 309    MPV      7.5 - 12.5 fL 10.7    ABSOLUTE NEUTROPHILS      1,500 - 7,800 cells/uL 1,351 (L)    ABSOLUTE LYMPHOCYTES      850 - 3,900 cells/uL 3,782    ABSOLUTE MONOCYTES      200 - 950 cells/uL 360    ABSOLUTE EOSINOPHILS      15 - 500 cells/uL 249    ABSOLUTE BASOPHILS      0 - 200 cells/uL 58    NEUTROPHILS      % 23.3    LYMPHOCYTES      % 65.2    MONOCYTES      % 6.2    EOSINOPHILS      % 4.3    BASOPHILS      % 1.0    GLUCOSE      65 - 139 mg/dL 96    UREA NITROGEN (BUN)      7 - 25 mg/dL 9    CREATININE      0.50 - 0.99 mg/dL 0.67    EGFR      > OR = 60 mL/min/1.73m2 110    SODIUM      135 - 146 mmol/L 140    POTASSIUM      3.5 - 5.3 mmol/L 4.1    CHLORIDE      98 - 110 mmol/L 107    CARBON DIOXIDE      20 - 32 mmol/L 26  "   ELECTROLYTE BALANCE      7 - 17 mmol/L (calc) 7    CALCIUM      8.6 - 10.2 mg/dL 9.4    PROTEIN, TOTAL      6.1 - 8.1 g/dL 6.8    ALBUMIN      3.6 - 5.1 g/dL 4.1    BILIRUBIN, TOTAL      0.2 - 1.2 mg/dL 0.4    ALKALINE PHOSPHATASE      31 - 125 U/L 49    AST      10 - 30 U/L 14    ALT      6 - 29 U/L 13    VITAMIN D, 1,25 (OH)2, TOTAL      18 - 72 pg/mL 71    VITAMIN D3, 1,25 (OH)2      pg/mL 22    VITAMIN D2, 1,25 (OH)2      pg/mL 49    SED RATE BY MODIFIED WESTERGREN      < OR = 20 mm/h 6    C-REACTIVE PROTEIN      <8.0 mg/L <3.0    INTERLEUKIN 6 (IL 6), SERUM      <5.00 pg/mL <1.40    CREATINE KINASE, TOTAL      20 - 239 U/L 115    DNA (DS) ANTIBODY      IU/mL <1    COMPLEMENT COMPONENT C4C      15 - 57 mg/dL 28    COMPLEMENT COMPONENT C3C      83 - 193 mg/dL 131    VITAMIN D,25-OH,TOTAL,IA      30 - 100 ng/mL 21 (L)            Assessment/Plan  1. Polyarthritis with negative rheumatoid factor (Multi)    2. Vitamin D deficiency    3. Other chronic pain    4. Encounter for long-term (current) use of medications             Orders Placed This Encounter   Procedures    DRUG SCREEN,URINE    CBC and Auto Differential    Comprehensive Metabolic Panel    C-Reactive Protein    Creatine Kinase    Sedimentation Rate    Urinalysis with Reflex Culture and Microscopic    Vitamin D 25-Hydroxy,Total (for eval of Vitamin D levels)        Previously, adherent and tolerating since April Simponi q 8 wks; Gets about 3 good weeks   Shuffling,  hurting all over off, cramping  Denies any recent or current infection.  Not on any NSAIDs or glucocorticoids.  1.5 pain  pills last about 3 hours  ROS+ for drenching nightsweats without fever, fatigue, ONOFRE, joint pain/swelling/stiffness, back pain, raynaud's without pitting/ulceration, weakness hands legs, depression/anxiety.  Rapid 3 consistent with high severity.  Labs reviewed  D/w pt tx options and decided on   Increase Simponi frequency to every 4 wks to aim at remission.  Advised of  possible side effects and importance of monitoring.   All questions answered.  Patient to follow up with primary care provider regarding all other medical issues not addressed today and for medical chart updating.         Since last appt, adherent and tolerating Simponi q 4 wks, SSZ.  Trying to limit glucocorticoids.    Denies any recent or current infection.  Not on any NSAIDs or glucocorticoids.  ROS+ for unintentional wt loss, fatigue, hair loss, scalp tenderness, blurry vision, swollen galnds/ndoes, atypical CP, ONOFRE, rashes, joint pain/swelling/stiffness, back pain, raynaud's without pitting/ulceration, weakness arms/hands, numbness/tingling, depression/anxiety.  Rapid 3 consistent with high severity.  Labs reviewed  D/w pt tx options  Continue regimen  Vit D  Advised of possible side effects and importance of monitoring.   All questions answered.  Patient to follow up with primary care provider regarding all other medical issues not addressed today and for medical chart updating.     Enedina Hernandez MD      Patient Care Team:  Dora Zamora MD as PCP - General (Family Medicine)  Dora Zamora MD as PCP - [facility] Medicaid PCP  Dora Zamora MD as PCP - [place] PCP  Deborah Leija as Care Manager (Case Management)  Enedina Hernandez MD as Consulting Physician (Rheumatology)

## 2025-03-28 ENCOUNTER — APPOINTMENT (OUTPATIENT)
Dept: INFUSION THERAPY | Facility: CLINIC | Age: 45
End: 2025-03-28
Payer: COMMERCIAL

## 2025-03-28 VITALS
OXYGEN SATURATION: 95 % | DIASTOLIC BLOOD PRESSURE: 73 MMHG | HEART RATE: 70 BPM | RESPIRATION RATE: 18 BRPM | BODY MASS INDEX: 29.46 KG/M2 | TEMPERATURE: 98.4 F | SYSTOLIC BLOOD PRESSURE: 117 MMHG | WEIGHT: 171.6 LBS

## 2025-03-28 DIAGNOSIS — M06.09 POLYARTHRITIS WITH NEGATIVE RHEUMATOID FACTOR (MULTI): ICD-10-CM

## 2025-03-28 RX ORDER — DIPHENHYDRAMINE HYDROCHLORIDE 50 MG/ML
50 INJECTION, SOLUTION INTRAMUSCULAR; INTRAVENOUS AS NEEDED
OUTPATIENT
Start: 2025-04-25

## 2025-03-28 RX ORDER — KETOROLAC TROMETHAMINE 30 MG/ML
30 INJECTION, SOLUTION INTRAMUSCULAR; INTRAVENOUS ONCE
OUTPATIENT
Start: 2025-04-25 | End: 2025-04-25

## 2025-03-28 RX ORDER — FAMOTIDINE 10 MG/ML
20 INJECTION, SOLUTION INTRAVENOUS ONCE AS NEEDED
OUTPATIENT
Start: 2025-04-25

## 2025-03-28 RX ORDER — KETOROLAC TROMETHAMINE 30 MG/ML
30 INJECTION, SOLUTION INTRAMUSCULAR; INTRAVENOUS ONCE
Status: COMPLETED | OUTPATIENT
Start: 2025-03-28 | End: 2025-03-28

## 2025-03-28 RX ORDER — EPINEPHRINE 0.3 MG/.3ML
0.3 INJECTION SUBCUTANEOUS EVERY 5 MIN PRN
OUTPATIENT
Start: 2025-04-25

## 2025-03-28 RX ORDER — ALBUTEROL SULFATE 0.83 MG/ML
3 SOLUTION RESPIRATORY (INHALATION) AS NEEDED
OUTPATIENT
Start: 2025-04-25

## 2025-03-28 RX ADMIN — KETOROLAC TROMETHAMINE 30 MG: 30 INJECTION, SOLUTION INTRAMUSCULAR; INTRAVENOUS at 13:58

## 2025-03-28 ASSESSMENT — ENCOUNTER SYMPTOMS
ARTHRALGIAS: 1
NUMBNESS: 0
FEVER: 0
DIZZINESS: 0
CONSTIPATION: 0
EXTREMITY WEAKNESS: 0
SORE THROAT: 0
MYALGIAS: 0
FREQUENCY: 0
PALPITATIONS: 0
VOMITING: 0
DIARRHEA: 0
TROUBLE SWALLOWING: 0
NAUSEA: 0
DYSURIA: 0
COUGH: 0
FATIGUE: 1
APPETITE CHANGE: 0
CHILLS: 0
EYE PROBLEMS: 0
WHEEZING: 0
HEMATURIA: 0
HEADACHES: 1
LIGHT-HEADEDNESS: 0
BLOOD IN STOOL: 0
SHORTNESS OF BREATH: 0
UNEXPECTED WEIGHT CHANGE: 0
VOICE CHANGE: 0
ABDOMINAL PAIN: 0
WOUND: 0
LEG SWELLING: 0

## 2025-03-28 ASSESSMENT — PAIN SCALES - GENERAL: PAINLEVEL_OUTOF10: 10-WORST PAIN EVER

## 2025-03-28 NOTE — PROGRESS NOTES
[facility]   Infusion Clinic Note   Date: 2025   Name: Susan Ford  : 1980   MRN: [id number]         Reason for Visit: OP Infusion (Simponi 150 mg every 28 days)         Today: We administered methylPREDNISolone sod succinate, ketorolac, and golimumab (Simponi Aria) 150 mg in sodium chloride 0.9% 100 mL IV.       Ordered By: Enedina Clements,*       For a Diagnosis of: Polyarthritis with negative rheumatoid factor (Multi)       At today's visit patient accompanied by: Child/Children      Today's Vitals:   Vitals:    25 1320 25 1442   BP: 119/76 117/73   Pulse: 70 70   Resp: 18 18   Temp: 36.8 °C (98.3 °F) 36.9 °C (98.4 °F)   SpO2: 95%    Weight: 77.8 kg (171 lb 9.6 oz)    PainSc: 10-Worst pain ever    PainLoc: Generalized              Pre - Treatment Checklist:      - Previous reaction to current treatment: no      (Assess patient for the concerns below. Document provider notification as appropriate).  - Active or recent infection with/without current antibiotic use: no  - Recent or planned invasive dental work: no  - Recent or planned surgeries: no  - Recently received or plans to receive vaccinations: no  - Has treatment related toxicities: no  - Any chance may be pregnant:  no      Pain: 10   - Is the pain different from normal: no   - Is prescribing Doctor aware:  yes      Labs: Reviewed       Fall Risk Screening: Bar Fall Risk  History of Falling, Immediate or Within 3 Months: No  Secondary Diagnosis: Yes  Ambulatory Aid: Crutches/cane/walker  Intravenous Therapy/Heparin Lock: Yes  Gait/Transferring: Normal/bedrest/immobile  Mental Status: Oriented to own ability  Bar Fall Risk Score: 50       Review Of Systems:  Review of Systems   Constitutional:  Positive for fatigue. Negative for appetite change, chills, fever and unexpected weight change.   HENT:   Negative for hearing loss, mouth sores, sore throat, tinnitus, trouble swallowing and voice  change.    Eyes:  Negative for eye problems.   Respiratory:  Negative for cough, shortness of breath and wheezing.    Cardiovascular:  Negative for chest pain, leg swelling and palpitations.   Gastrointestinal:  Negative for abdominal pain, blood in stool, constipation, diarrhea, nausea and vomiting.   Genitourinary:  Negative for dysuria, frequency and hematuria.    Musculoskeletal:  Positive for arthralgias. Negative for myalgias.   Skin:  Negative for itching, rash and wound.   Neurological:  Positive for headaches. Negative for dizziness, extremity weakness, light-headedness and numbness.        History of migraines         Infusion Readiness:  - Assessment Concerns Related to Infusion: No  - Provider notified: n/a      New Patient Education:    N/A (returning patient for continuation of therapy. Ongoing education provided as needed.)        Treatment Conditions & Drug Specific Questions:    Golimumab  (SIMPONI)    (Unless otherwise specified on patient specific therapy plan):     TREATMENT CONDITIONS:  Unless otherwise specified on patient specific thearpy plan HOLD and notify provider prior to proceeding with today's infusion if patient with:  o Positive T-Spot  o Positive Hepatitis B Surface Ag / Hepatitis C     Lab Results   Component Value Date    TBGRES Negative  Reference range: NEGATIVE   10/14/2020    TSPOTR  09/12/2023     Negative  Reference range: Normal Value: Negative    A negative test result does not exclude the possibility of exposure  to   or infection with Mycobacterium tuberculosis (M. tuberculosis).    Patients with recent exposure to TB infected individuals exhibiting a   negative T-SPOT.TB result should be considered for retesting within 6   weeks or if other relevant clinical symptoms indicate.  Results from   T-SPOT.TB testing must be used in conjunction with each individual's   epidemiological history, current medical status, and results of other   diagnostic evaluations.  The T-SPOT.TB  "test is qualitative and  results   are reported as positive, borderline or negative, given that the test   controls perform as expected. In line with the Centers for Disease   Control and Prevention's 2010 recommendation to report quantitative   measurements alongside the qualitative result, the laboratory  provides   spot counts for informational purposes only.  The T-SPOT.TB test  should   not be interpreted as a quantitative test.  Test performed at:  [facility] [address] [place] [id number]        Lab Results   Component Value Date    HEPBSAG NEGATIVE 10/14/2020      No results found for: \"NONUHFIRE\", \"NONUHSWGH\", \"NONUHFISH\", \"EXTHEPBSAG\"  Lab Results   Component Value Date    HEPCAB  10/14/2020     Negative  Reference range: NEGATIVE  Performed at the [facility] unless   otherwise noted.        Lab Results   Component Value Date    HEPCAB  10/14/2020     Negative  Reference range: NEGATIVE  Performed at the [facility] unless   otherwise noted.       No results found for: \"HBCTI\", \"HEPBCAB\"    Lab Results   Component Value Date    WBC 5.8 03/25/2025    HGB 13.0 03/25/2025    HCT 40.3 03/25/2025    MCV 84.5 03/25/2025     03/25/2025        Patient meets treatment conditions? Yes    DRUG SPECIFIC QUESTIONS:   - Up to date on all immunizations per patient report? Yes    Available Immunization Records:  Immunization History   Administered Date(s) Administered    Flu vaccine (IIV4), preservative free *Check age/dose* 09/17/2020    Influenza, injectable, quadrivalent 10/14/2020    PPD Test 02/24/2012, 03/02/2012, 09/20/2019    Pneumococcal conjugate vaccine, 20-valent (PREVNAR 20) 06/21/2023    Td (adult) 02/26/2011    Tdap vaccine, age 7 year and older (BOOSTRIX, ADACEL) 09/17/2020, 11/10/2020         - Any history of or new or worsening s/s of heart failure which may include dyspnea, edema? No  (If worsening s/s notify provider prior to proceeding. " Simponi may cause exacerbation of heart failure)     - Any new diagnosis of cancer, especially lymphomas? No    (Box Warning: Malignancy. If YES notify prescribing provider prior to proceeding )      REMINDER:  WEIGHT BASED DRUG    Recommended Vitals/Observation:  Vitals: Take vital signs prior to starting infusion, at infusion conclusion and as needed.   Observation: No observation.        Weight Based Drug Calculations:    WEIGHT BASED DRUGS: Golimumab (SIMPONI)   Patient's dosing weight (kg): 74.8     10% weight variance for prescribed treatment: 67.32 kg to 82.28 kg     Patient's weight today:   Vitals:    03/28/25 1320   Weight: 77.8 kg (171 lb 9.6 oz)         weight range for prescribed dose:     Patient weight today falls outside of 10% variance or  weight range: No     Home Care pharmacist informed of weight variance: Not applicable    Doses that are weight based have an acceptable variance rule within 10% of the prescribed   order and/or within  weight range. If patient weight on day of infusion falls   outside of the 10% variance, or weight range, infusion is administered and   pharmacy contacted regarding future dosing adjustments, per policy.      Post Treatment: Patient tolerated treatment without issue and was discharged in no apparent distress.      Note Authored / Patient Cared for By: Lamonte Camacho RN     1431 Line flushed with NS 50 ml when infusion bag completed.  _________________________________________________________________________    Note authored and patient cared for by: Lamonte Camacho RN  Note/Encounter reviewed by: Brook CHENG NP. This provider on site at time of patient infusion. Infusion staff to notify this provider of any questions, concerns, abnormals or issues during infusion.    Final check of medication completed by this PROSPER / or on-site pharmacist with administering nurse using positive identification prior to administration. Final  appearance of product checked for accuracy and conformity to the formula of the prepared product. Assured use of correct ingredients, accurate calculations and precise measurements under appropriate conditions and procedures.    No issues reported during today's encounter. Pt. tolerated infusion without difficulty. Pt. not independently evaluated by this provider during today's encounter.  (Brook CHENG NP)

## 2025-03-28 NOTE — PATIENT INSTRUCTIONS
Today :We administered methylPREDNISolone sod succinate, ketorolac, and golimumab (Simponi Aria) 150 mg in sodium chloride 0.9% 100 mL IV.     For:   1. 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     (Tell all doctors including dentists that you are taking this medication)     Go to the emergency room or call 911 if:  -You have signs of allergic reaction:   -Rash, hives, itching.   -Swollen, blistered, peeling skin.   -Swelling of face, lips, mouth, tongue or throat.   -Tightness of chest, trouble breathing, swallowing or talking     Call your doctor:  - If IV / injection site gets red, warm, swollen, itchy or leaks fluid or pus.     (Leave dressing on your IV site for at least 2 hours and keep area clean and dry  - If you get sick or have symptoms of infection or are not feeling well for any reason.    (Wash your hands often, stay away from people who are sick)  - If you have side effects from your medication that do not go away or are bothersome.     (Refer to the teaching your nurse gave you for side effects to call your doctor about)    - Common side effects may include:  stuffy nose, headache, feeling tired, muscle aches, upset stomach  - Before receiving any vaccines     - Call the [facility] at   If:  - You get sick, are on antibiotics, have had a recent vaccine, have surgery or dental work and your doctor wants your visit rescheduled.  - You need to cancel and reschedule your visit for any reason. Call at least 2 days before your visit if you need to cancel.   - Your insurance changes before your next visit.    (We will need to get approval from your new insurance. This can take up to two weeks.)     The [facility] is opened Monday thru Friday. We are closed on weekends and holidays.   Voice mail will take your call if  the center is closed. If you leave a message please allow 24 hours for a call back during weekdays. If you leave a message on a weekend/holiday, we will call you back the next business day.    A pharmacist is available Monday - Friday from 8:30AM to 3:30PM to help answer any questions you may have about your prescriptions(s). Please call pharmacy at:    [facility]: [phone]  [facility]: [phone]  [facility]: [phone]

## 2025-03-29 LAB
1,25(OH)2D SERPL-MCNC: 71 PG/ML (ref 18–72)
1,25(OH)2D2 SERPL-MCNC: 49 PG/ML
1,25(OH)2D3 SERPL-MCNC: 22 PG/ML
25(OH)D3+25(OH)D2 SERPL-MCNC: 21 NG/ML (ref 30–100)
ALBUMIN SERPL-MCNC: 4.1 G/DL (ref 3.6–5.1)
ALP SERPL-CCNC: 49 U/L (ref 31–125)
ALT SERPL-CCNC: 13 U/L (ref 6–29)
ANION GAP SERPL CALCULATED.4IONS-SCNC: 7 MMOL/L (CALC) (ref 7–17)
AST SERPL-CCNC: 14 U/L (ref 10–30)
BASOPHILS # BLD AUTO: 58 CELLS/UL (ref 0–200)
BASOPHILS NFR BLD AUTO: 1 %
BILIRUB SERPL-MCNC: 0.4 MG/DL (ref 0.2–1.2)
BUN SERPL-MCNC: 9 MG/DL (ref 7–25)
C1Q SERPL-MCNC: NORMAL UG/ML
C3 SERPL-MCNC: 131 MG/DL (ref 83–193)
C4 SERPL-MCNC: 28 MG/DL (ref 15–57)
CALCIUM SERPL-MCNC: 9.4 MG/DL (ref 8.6–10.2)
CHLORIDE SERPL-SCNC: 107 MMOL/L (ref 98–110)
CK SERPL-CCNC: 115 U/L (ref 20–239)
CO2 SERPL-SCNC: 26 MMOL/L (ref 20–32)
CREAT SERPL-MCNC: 0.67 MG/DL (ref 0.5–0.99)
CRP SERPL-MCNC: <3 MG/L
DSDNA AB SER-ACNC: <1 IU/ML
EGFRCR SERPLBLD CKD-EPI 2021: 110 ML/MIN/1.73M2
EOSINOPHIL # BLD AUTO: 249 CELLS/UL (ref 15–500)
EOSINOPHIL NFR BLD AUTO: 4.3 %
ERYTHROCYTE [DISTWIDTH] IN BLOOD BY AUTOMATED COUNT: 12.6 % (ref 11–15)
ERYTHROCYTE [SEDIMENTATION RATE] IN BLOOD BY WESTERGREN METHOD: 6 MM/H
GLUCOSE SERPL-MCNC: 96 MG/DL (ref 65–139)
HCT VFR BLD AUTO: 40.3 % (ref 35–45)
HGB BLD-MCNC: 13 G/DL (ref 11.7–15.5)
IL6 SERPL-MCNC: <1.4 PG/ML
LYMPHOCYTES # BLD AUTO: 3782 CELLS/UL (ref 850–3900)
LYMPHOCYTES NFR BLD AUTO: 65.2 %
MCH RBC QN AUTO: 27.3 PG (ref 27–33)
MCHC RBC AUTO-ENTMCNC: 32.3 G/DL (ref 32–36)
MCV RBC AUTO: 84.5 FL (ref 80–100)
MONOCYTES # BLD AUTO: 360 CELLS/UL (ref 200–950)
MONOCYTES NFR BLD AUTO: 6.2 %
NEUTROPHILS # BLD AUTO: 1351 CELLS/UL (ref 1500–7800)
NEUTROPHILS NFR BLD AUTO: 23.3 %
PLATELET # BLD AUTO: 309 THOUSAND/UL (ref 140–400)
PMV BLD REES-ECKER: 10.7 FL (ref 7.5–12.5)
POTASSIUM SERPL-SCNC: 4.1 MMOL/L (ref 3.5–5.3)
PROT SERPL-MCNC: 6.8 G/DL (ref 6.1–8.1)
RBC # BLD AUTO: 4.77 MILLION/UL (ref 3.8–5.1)
SODIUM SERPL-SCNC: 140 MMOL/L (ref 135–146)
WBC # BLD AUTO: 5.8 THOUSAND/UL (ref 3.8–10.8)

## 2025-03-29 RX ORDER — ERGOCALCIFEROL 1.25 MG/1
50000 CAPSULE ORAL
Qty: 12 CAPSULE | Refills: 1 | Status: SHIPPED | OUTPATIENT
Start: 2025-03-30 | End: 2025-06-28

## 2025-03-29 RX ORDER — TRAMADOL HYDROCHLORIDE 50 MG/1
50 TABLET ORAL EVERY 6 HOURS PRN
Qty: 120 TABLET | Refills: 3 | Status: SHIPPED | OUTPATIENT
Start: 2025-03-29

## 2025-04-02 LAB
1,25(OH)2D SERPL-MCNC: 71 PG/ML (ref 18–72)
1,25(OH)2D2 SERPL-MCNC: 49 PG/ML
1,25(OH)2D3 SERPL-MCNC: 22 PG/ML
25(OH)D3+25(OH)D2 SERPL-MCNC: 21 NG/ML (ref 30–100)
ALBUMIN SERPL-MCNC: 4.1 G/DL (ref 3.6–5.1)
ALP SERPL-CCNC: 49 U/L (ref 31–125)
ALT SERPL-CCNC: 13 U/L (ref 6–29)
ANION GAP SERPL CALCULATED.4IONS-SCNC: 7 MMOL/L (CALC) (ref 7–17)
AST SERPL-CCNC: 14 U/L (ref 10–30)
BASOPHILS # BLD AUTO: 58 CELLS/UL (ref 0–200)
BASOPHILS NFR BLD AUTO: 1 %
BILIRUB SERPL-MCNC: 0.4 MG/DL (ref 0.2–1.2)
BUN SERPL-MCNC: 9 MG/DL (ref 7–25)
C1Q SERPL-MCNC: 8.1 MG/DL (ref 5–8.6)
C3 SERPL-MCNC: 131 MG/DL (ref 83–193)
C4 SERPL-MCNC: 28 MG/DL (ref 15–57)
CALCIUM SERPL-MCNC: 9.4 MG/DL (ref 8.6–10.2)
CHLORIDE SERPL-SCNC: 107 MMOL/L (ref 98–110)
CK SERPL-CCNC: 115 U/L (ref 20–239)
CO2 SERPL-SCNC: 26 MMOL/L (ref 20–32)
CREAT SERPL-MCNC: 0.67 MG/DL (ref 0.5–0.99)
CRP SERPL-MCNC: <3 MG/L
DSDNA AB SER-ACNC: <1 IU/ML
EGFRCR SERPLBLD CKD-EPI 2021: 110 ML/MIN/1.73M2
EOSINOPHIL # BLD AUTO: 249 CELLS/UL (ref 15–500)
EOSINOPHIL NFR BLD AUTO: 4.3 %
ERYTHROCYTE [DISTWIDTH] IN BLOOD BY AUTOMATED COUNT: 12.6 % (ref 11–15)
ERYTHROCYTE [SEDIMENTATION RATE] IN BLOOD BY WESTERGREN METHOD: 6 MM/H
GLUCOSE SERPL-MCNC: 96 MG/DL (ref 65–139)
HCT VFR BLD AUTO: 40.3 % (ref 35–45)
HGB BLD-MCNC: 13 G/DL (ref 11.7–15.5)
IL6 SERPL-MCNC: <1.4 PG/ML
LYMPHOCYTES # BLD AUTO: 3782 CELLS/UL (ref 850–3900)
LYMPHOCYTES NFR BLD AUTO: 65.2 %
MCH RBC QN AUTO: 27.3 PG (ref 27–33)
MCHC RBC AUTO-ENTMCNC: 32.3 G/DL (ref 32–36)
MCV RBC AUTO: 84.5 FL (ref 80–100)
MONOCYTES # BLD AUTO: 360 CELLS/UL (ref 200–950)
MONOCYTES NFR BLD AUTO: 6.2 %
NEUTROPHILS # BLD AUTO: 1351 CELLS/UL (ref 1500–7800)
NEUTROPHILS NFR BLD AUTO: 23.3 %
PLATELET # BLD AUTO: 309 THOUSAND/UL (ref 140–400)
PMV BLD REES-ECKER: 10.7 FL (ref 7.5–12.5)
POTASSIUM SERPL-SCNC: 4.1 MMOL/L (ref 3.5–5.3)
PROT SERPL-MCNC: 6.8 G/DL (ref 6.1–8.1)
RBC # BLD AUTO: 4.77 MILLION/UL (ref 3.8–5.1)
SODIUM SERPL-SCNC: 140 MMOL/L (ref 135–146)
WBC # BLD AUTO: 5.8 THOUSAND/UL (ref 3.8–10.8)

## 2025-04-07 ENCOUNTER — APPOINTMENT (OUTPATIENT)
Dept: ORTHOPEDIC SURGERY | Facility: HOSPITAL | Age: 45
End: 2025-04-07
Payer: COMMERCIAL

## 2025-04-08 ENCOUNTER — PATIENT MESSAGE (OUTPATIENT)
Dept: RHEUMATOLOGY | Facility: CLINIC | Age: 45
End: 2025-04-08
Payer: COMMERCIAL

## 2025-04-08 DIAGNOSIS — L30.9 DERMATITIS: Primary | ICD-10-CM

## 2025-04-10 RX ORDER — TRIAMCINOLONE ACETONIDE 1 MG/G
OINTMENT TOPICAL 2 TIMES DAILY
Qty: 80 G | Refills: 5 | Status: SHIPPED | OUTPATIENT
Start: 2025-04-10

## 2025-04-11 ENCOUNTER — PATIENT OUTREACH (OUTPATIENT)
Dept: PRIMARY CARE | Facility: CLINIC | Age: 45
End: 2025-04-11
Payer: COMMERCIAL

## 2025-04-11 DIAGNOSIS — M06.09 RHEUMATOID ARTHRITIS OF MULTIPLE SITES WITHOUT RHEUMATOID FACTOR (MULTI): ICD-10-CM

## 2025-04-11 DIAGNOSIS — M19.90 OSTEOARTHRITIS, UNSPECIFIED OSTEOARTHRITIS TYPE, UNSPECIFIED SITE: ICD-10-CM

## 2025-04-11 DIAGNOSIS — M06.09 POLYARTHRITIS WITH NEGATIVE RHEUMATOID FACTOR (MULTI): ICD-10-CM

## 2025-04-11 DIAGNOSIS — E55.9 VITAMIN D DEFICIENCY: ICD-10-CM

## 2025-04-11 DIAGNOSIS — G89.29 OTHER CHRONIC PAIN: ICD-10-CM

## 2025-04-11 RX ORDER — OXYCODONE AND ACETAMINOPHEN 10; 325 MG/1; MG/1
1 TABLET ORAL EVERY 6 HOURS PRN
Qty: 120 TABLET | Refills: 0 | Status: SHIPPED | OUTPATIENT
Start: 2025-04-11

## 2025-04-11 RX ORDER — ERGOCALCIFEROL 1.25 MG/1
1.25 CAPSULE ORAL
Qty: 12 CAPSULE | Refills: 1 | Status: SHIPPED | OUTPATIENT
Start: 2025-04-13 | End: 2025-07-12

## 2025-04-11 NOTE — TELEPHONE ENCOUNTER
"PT CALLED TO REQUEST REFILL OF OXYCODONE 10 MG, asking for \"couple days \" EELY BECAUSE SHE IS GOING OUT OF TOWN ON 4/15/25. ALSO ASKING FOR REFILL OF VIT D BECAUSE SHE BELIEVES SHE ACCIDENTALLY THOUGH OUT Rx.    "
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
## 2025-04-18 ENCOUNTER — APPOINTMENT (OUTPATIENT)
Dept: ORTHOPEDIC SURGERY | Facility: CLINIC | Age: 45
End: 2025-04-18
Payer: COMMERCIAL

## 2025-04-25 ENCOUNTER — APPOINTMENT (OUTPATIENT)
Dept: INFUSION THERAPY | Facility: CLINIC | Age: 45
End: 2025-04-25
Payer: COMMERCIAL

## 2025-04-28 ENCOUNTER — DOCUMENTATION (OUTPATIENT)
Dept: PHYSICAL THERAPY | Facility: CLINIC | Age: 45
End: 2025-04-28
Payer: COMMERCIAL

## 2025-04-29 NOTE — PROGRESS NOTES
History of Present Illness:  Date of Surgery: 12/18/24 diagnostic left knee arthroscopy with limited synovectomy and removal of ACL/cyclops lesion and open proximal patellofemoral realignment with VMO advancement. Overall she is doing well. She does have some occasional catching in the knee but it has improved. She is having a lupus flare at this time and is in significant pain.     Exam:  Mild effusion  Incision is healed  Flexion to 120  Pain with patellar grind  Full passive motion  No calf tenderness   Negative Geraldo's test  Distal neurovascular exam intact    Assessment:  S/p diagnostic left knee arthroscopy with limited synovectomy and removal of ACL/cyclops lesion and open proximal patellofemoral realignment with VMO advancement    Plan:  New knee brace today  Ice and NSAID as needed.   Follow-up as needed    Scribe Attestation  By signing my name below, Leela MAHMOOD Scribe   attest that this documentation has been prepared under the direction and in the presence of Hayden Goldman MD.

## 2025-05-01 ENCOUNTER — OFFICE VISIT (OUTPATIENT)
Dept: ORTHOPEDIC SURGERY | Facility: CLINIC | Age: 45
End: 2025-05-01
Payer: COMMERCIAL

## 2025-05-01 DIAGNOSIS — Z98.890 S/P LEFT KNEE ARTHROSCOPY: Primary | ICD-10-CM

## 2025-05-01 PROCEDURE — 99213 OFFICE O/P EST LOW 20 MIN: CPT | Performed by: ORTHOPAEDIC SURGERY

## 2025-05-01 PROCEDURE — L1812 KO ELASTIC W/JOINTS PRE OTS: HCPCS | Performed by: ORTHOPAEDIC SURGERY

## 2025-05-12 ENCOUNTER — PATIENT MESSAGE (OUTPATIENT)
Dept: RHEUMATOLOGY | Facility: CLINIC | Age: 45
End: 2025-05-12
Payer: COMMERCIAL

## 2025-05-12 DIAGNOSIS — M06.09 POLYARTHRITIS WITH NEGATIVE RHEUMATOID FACTOR (MULTI): ICD-10-CM

## 2025-05-12 DIAGNOSIS — G89.29 OTHER CHRONIC PAIN: ICD-10-CM

## 2025-05-14 RX ORDER — OXYCODONE AND ACETAMINOPHEN 10; 325 MG/1; MG/1
1 TABLET ORAL EVERY 4 HOURS PRN
Qty: 120 TABLET | Refills: 0 | Status: SHIPPED | OUTPATIENT
Start: 2025-05-14

## 2025-05-15 ENCOUNTER — APPOINTMENT (OUTPATIENT)
Dept: PRIMARY CARE | Facility: CLINIC | Age: 45
End: 2025-05-15
Payer: COMMERCIAL

## 2025-05-16 ENCOUNTER — PATIENT OUTREACH (OUTPATIENT)
Dept: PRIMARY CARE | Facility: CLINIC | Age: 45
End: 2025-05-16
Payer: COMMERCIAL

## 2025-05-16 DIAGNOSIS — M06.09 RHEUMATOID ARTHRITIS OF MULTIPLE SITES WITHOUT RHEUMATOID FACTOR (MULTI): ICD-10-CM

## 2025-05-16 DIAGNOSIS — M19.90 OSTEOARTHRITIS, UNSPECIFIED OSTEOARTHRITIS TYPE, UNSPECIFIED SITE: ICD-10-CM

## 2025-05-16 NOTE — PROGRESS NOTES
Care Management Monthly Outreach  Chart review completed  Confirmation of at least 2 patient identifiers  Change in insurance? No    Has patient been to ER/Urgent Care since last outreach? No    Last Office Visit with PCP: 3/28/2024   Next Office Visit with PCP: 7/9/2025   APC Collaboration: n/a    Chronic Conditions and Outreach Summary:   Rheumatoid arthritis of multiple sites without rheumatoid factor (Multi)    Osteoarthritis, unspecified osteoarthritis type, unspecified site    Chart review and outreach call placed to patient.  She is celebrating birthday today with family and in good spirits despite having RA/Lupus flare.  Her Symponi was delay by insurance company but Dr. Garcia did receive authorization and scheduled for Monday infusion.  She is taking Percocet for pain as well.  Patient working with her employer for L[employer] who is applying for SSD and had meeting yesterday, therefore had to reschedule her appt with PCP.  She wanted to discuss starting an antidepressant in conjunction with her weekly counseling sessions, but resumed taking some old medication Dr. Irizarry had her on (I believe Duloxetine) and she said it seems to be helping.  No med refills or questions voiced at this time.     Medications:   Are there medication changes since last visit? No  Refills needed? No    Social Drivers of Health: Deferred  Care Gaps Addressed? Deferred  Care Plan addressed: Yes    Upcoming Appointments:   Future Appointments       Date / Time Provider Department Dept Phone    5/19/2025 11:00 AM TRI INFUSION 03  [facility]     6/20/2025 1:00 PM TRI INFUSION 05  [facility]     6/30/2025 11:20 AM (Arrive by 11:05 AM) Ritchie Weber DO [facility] [phone]    7/9/2025 10:00 AM Dora Zamora MD [facility] [phone]    7/18/2025 1:00 PM TRI INFUSION 03  [facility]     7/31/2025 10:20 AM Enedina Hernandez MD  [facility] [phone]    8/15/2025 1:00 PM TRI INFUSION 04  [facility]     9/12/2025 1:00 PM TRI INFUSION 05  [facility]     10/10/2025 1:00 PM TRI INFUSION 03  [facility]     11/7/2025 1:00 PM [facility]           Blood Pressures Reviewed  BP Readings from Last 3 Encounters:   03/28/25 117/73   03/26/25 132/64   02/28/25 121/66     Labs Reviewed:  Lab Results   Component Value Date    CREATININE 0.67 03/25/2025    GLUCOSE 96 03/25/2025    ALKPHOS 49 03/25/2025    K 4.1 03/25/2025    PROT 6.8 03/25/2025     03/25/2025    CALCIUM 9.4 03/25/2025    AST 14 03/25/2025    ALT 13 03/25/2025    BUN 9 03/25/2025     Lab Results   Component Value Date    TRIG 158 (H) 07/11/2022    CHOL 297 (H) 07/11/2022    LDLCALC 194 (H) 07/11/2022    HDL 71 07/11/2022     Lab Results   Component Value Date    HGBA1C 5.2 02/02/2021     Lab Results   Component Value Date    WBC 5.8 03/25/2025    RBC 4.77 03/25/2025    HGB 13.0 03/25/2025     03/25/2025   No other concerns at this time.  Agreeable to continue monthly outreaches.  Encouraged to call if questions or concerns arise.    Deborah Leija

## 2025-05-19 ENCOUNTER — APPOINTMENT (OUTPATIENT)
Dept: INFUSION THERAPY | Facility: CLINIC | Age: 45
End: 2025-05-19
Payer: COMMERCIAL

## 2025-05-19 VITALS
SYSTOLIC BLOOD PRESSURE: 110 MMHG | RESPIRATION RATE: 18 BRPM | HEART RATE: 63 BPM | DIASTOLIC BLOOD PRESSURE: 70 MMHG | TEMPERATURE: 98.4 F | WEIGHT: 171 LBS | OXYGEN SATURATION: 100 % | BODY MASS INDEX: 29.35 KG/M2

## 2025-05-19 DIAGNOSIS — M06.09 POLYARTHRITIS WITH NEGATIVE RHEUMATOID FACTOR (MULTI): ICD-10-CM

## 2025-05-19 PROCEDURE — 96365 THER/PROPH/DIAG IV INF INIT: CPT | Performed by: NURSE PRACTITIONER

## 2025-05-19 PROCEDURE — 96375 TX/PRO/DX INJ NEW DRUG ADDON: CPT | Performed by: NURSE PRACTITIONER

## 2025-05-19 RX ORDER — DIPHENHYDRAMINE HYDROCHLORIDE 50 MG/ML
50 INJECTION, SOLUTION INTRAMUSCULAR; INTRAVENOUS AS NEEDED
OUTPATIENT
Start: 2025-05-23

## 2025-05-19 RX ORDER — FAMOTIDINE 10 MG/ML
20 INJECTION, SOLUTION INTRAVENOUS ONCE AS NEEDED
OUTPATIENT
Start: 2025-05-23

## 2025-05-19 RX ORDER — KETOROLAC TROMETHAMINE 30 MG/ML
30 INJECTION, SOLUTION INTRAMUSCULAR; INTRAVENOUS ONCE
OUTPATIENT
Start: 2025-05-23 | End: 2025-05-23

## 2025-05-19 RX ORDER — KETOROLAC TROMETHAMINE 30 MG/ML
30 INJECTION, SOLUTION INTRAMUSCULAR; INTRAVENOUS ONCE
Status: COMPLETED | OUTPATIENT
Start: 2025-05-19 | End: 2025-05-19

## 2025-05-19 RX ORDER — EPINEPHRINE 0.3 MG/.3ML
0.3 INJECTION SUBCUTANEOUS EVERY 5 MIN PRN
OUTPATIENT
Start: 2025-05-23

## 2025-05-19 RX ORDER — ALBUTEROL SULFATE 0.83 MG/ML
3 SOLUTION RESPIRATORY (INHALATION) AS NEEDED
OUTPATIENT
Start: 2025-05-23

## 2025-05-19 RX ADMIN — KETOROLAC TROMETHAMINE 30 MG: 30 INJECTION, SOLUTION INTRAMUSCULAR; INTRAVENOUS at 11:53

## 2025-05-19 ASSESSMENT — ENCOUNTER SYMPTOMS
DIARRHEA: 0
BRUISES/BLEEDS EASILY: 0
VOMITING: 0
SHORTNESS OF BREATH: 0
FATIGUE: 1
LEG SWELLING: 0
TROUBLE SWALLOWING: 0
FREQUENCY: 0
WHEEZING: 0
DIZZINESS: 0
NUMBNESS: 0
FEVER: 0
WOUND: 0
EYE PROBLEMS: 0
COUGH: 0
MYALGIAS: 0
DYSURIA: 0
HEMATURIA: 0
HEADACHES: 0
CONSTIPATION: 0
ABDOMINAL PAIN: 0
APPETITE CHANGE: 0
VOICE CHANGE: 0
UNEXPECTED WEIGHT CHANGE: 0
LIGHT-HEADEDNESS: 0
PALPITATIONS: 0
SORE THROAT: 0
BLOOD IN STOOL: 0
ARTHRALGIAS: 0
CHILLS: 0
EXTREMITY WEAKNESS: 0

## 2025-05-19 ASSESSMENT — PAIN SCALES - GENERAL: PAINLEVEL_OUTOF10: 8

## 2025-05-19 NOTE — PATIENT INSTRUCTIONS
Today :We administered methylPREDNISolone sod succinate, ketorolac, and golimumab (Simponi Aria) 150 mg in sodium chloride 0.9% 100 mL IV.     For:   1. 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     (Tell all doctors including dentists that you are taking this medication)     Go to the emergency room or call 911 if:  -You have signs of allergic reaction:   -Rash, hives, itching.   -Swollen, blistered, peeling skin.   -Swelling of face, lips, mouth, tongue or throat.   -Tightness of chest, trouble breathing, swallowing or talking     Call your doctor:  - If IV / injection site gets red, warm, swollen, itchy or leaks fluid or pus.     (Leave dressing on your IV site for at least 2 hours and keep area clean and dry  - If you get sick or have symptoms of infection or are not feeling well for any reason.    (Wash your hands often, stay away from people who are sick)  - If you have side effects from your medication that do not go away or are bothersome.     (Refer to the teaching your nurse gave you for side effects to call your doctor about)    - Common side effects may include:  stuffy nose, headache, feeling tired, muscle aches, upset stomach  - Before receiving any vaccines     - Call the [facility] at   If:  - You get sick, are on antibiotics, have had a recent vaccine, have surgery or dental work and your doctor wants your visit rescheduled.  - You need to cancel and reschedule your visit for any reason. Call at least 2 days before your visit if you need to cancel.   - Your insurance changes before your next visit.    (We will need to get approval from your new insurance. This can take up to two weeks.)     The [facility] is opened Monday thru Friday. We are closed on weekends and holidays.   Voice mail will take your call if  the center is closed. If you leave a message please allow 24 hours for a call back during weekdays. If you leave a message on a weekend/holiday, we will call you back the next business day.    A pharmacist is available Monday - Friday from 8:30AM to 3:30PM to help answer any questions you may have about your prescriptions(s). Please call pharmacy at:    [facility]: [phone]  [facility]: [phone]  [facility]: [phone]

## 2025-05-19 NOTE — PROGRESS NOTES
[facility]   Infusion Clinic Note   Date: May 19, 2025   Name: Susan Ford  : 1980   MRN: [id number]         Reason for Visit: OP Infusion (Simponi 150 mg every 28 days)         Today: We administered methylPREDNISolone sod succinate, ketorolac, and golimumab (Simponi Aria) 150 mg in sodium chloride 0.9% 100 mL IV.       Ordered By: Enedina Clements,*       For a Diagnosis of: Polyarthritis with negative rheumatoid factor (Multi)       At today's visit patient accompanied by:       Today's Vitals:   Vitals:    25 1117 25 1240   BP: 121/51 110/70   Pulse: 67 63   Resp: 18 18   Temp: 36.7 °C (98.1 °F) 36.9 °C (98.4 °F)   TempSrc: Temporal    SpO2: 100% 100%   Weight: 77.6 kg (171 lb)    PainSc:   8    PainLoc: Generalized              Pre - Treatment Checklist:      - Previous reaction to current treatment: no      (Assess patient for the concerns below. Document provider notification as appropriate).  - Active or recent infection with/without current antibiotic use: no  - Recent or planned invasive dental work: no  - Recent or planned surgeries: no  - Recently received or plans to receive vaccinations: no  - Has treatment related toxicities: no  - Any chance may be pregnant:  no      Pain: 8   - Is the pain different from normal: no   - Is prescribing Doctor aware:  n/a      Labs: Reviewed       Fall Risk Screening: Bar Fall Risk  History of Falling, Immediate or Within 3 Months: No  Secondary Diagnosis: Yes  Ambulatory Aid: Walks without aid/bedrest/nurse assist  Intravenous Therapy/Heparin Lock: Yes  Gait/Transferring: Normal/bedrest/immobile  Mental Status: Oriented to own ability  Bar Fall Risk Score: 35       Review Of Systems:  Review of Systems   Constitutional:  Positive for fatigue. Negative for appetite change, chills, fever and unexpected weight change.   HENT:   Negative for hearing loss, mouth sores, sore throat, tinnitus, trouble  swallowing and voice change.    Eyes:  Negative for eye problems.   Respiratory:  Negative for cough, shortness of breath and wheezing.    Cardiovascular:  Negative for chest pain, leg swelling and palpitations.   Gastrointestinal:  Negative for abdominal pain, blood in stool, constipation, diarrhea and vomiting.   Genitourinary:  Negative for dysuria, frequency and hematuria.    Musculoskeletal:  Negative for arthralgias and myalgias.   Skin:  Negative for itching, rash and wound.   Neurological:  Negative for dizziness, extremity weakness, headaches, light-headedness and numbness.   Hematological:  Does not bruise/bleed easily.         Infusion Readiness:  - Assessment Concerns Related to Infusion: No  - Provider notified: n/a      New Patient Education:    N/A (returning patient for continuation of therapy. Ongoing education provided as needed.)        Treatment Conditions & Drug Specific Questions:    Golimumab  (SIMPONI)    (Unless otherwise specified on patient specific therapy plan):     TREATMENT CONDITIONS:  Unless otherwise specified on patient specific thearpy plan HOLD and notify provider prior to proceeding with today's infusion if patient with:  o Positive T-Spot  o Positive Hepatitis B Surface Ag / Hepatitis C     Lab Results   Component Value Date    TBGRES Negative  Reference range: NEGATIVE   10/14/2020    TSPOTR  09/12/2023     Negative  Reference range: Normal Value: Negative    A negative test result does not exclude the possibility of exposure  to   or infection with Mycobacterium tuberculosis (M. tuberculosis).    Patients with recent exposure to TB infected individuals exhibiting a   negative T-SPOT.TB result should be considered for retesting within 6   weeks or if other relevant clinical symptoms indicate.  Results from   T-SPOT.TB testing must be used in conjunction with each individual's   epidemiological history, current medical status, and results of other   diagnostic evaluations.  The  "T-SPOT.TB test is qualitative and  results   are reported as positive, borderline or negative, given that the test   controls perform as expected. In line with the Centers for Disease   Control and Prevention's 2010 recommendation to report quantitative   measurements alongside the qualitative result, the laboratory  provides   spot counts for informational purposes only.  The T-SPOT.TB test  should   not be interpreted as a quantitative test.  Test performed at:  [facility] [address] [place] [id number]        Lab Results   Component Value Date    HEPBSAG NEGATIVE 10/14/2020      No results found for: \"NONUHFIRE\", \"NONUHSWGH\", \"NONUHFISH\", \"EXTHEPBSAG\"  Lab Results   Component Value Date    HEPCAB  10/14/2020     Negative  Reference range: NEGATIVE  Performed at the [facility] unless   otherwise noted.        Lab Results   Component Value Date    HEPCAB  10/14/2020     Negative  Reference range: NEGATIVE  Performed at the [facility] unless   otherwise noted.       No results found for: \"HBCTI\", \"HEPBCAB\"    Lab Results   Component Value Date    WBC 5.8 03/25/2025    HGB 13.0 03/25/2025    HCT 40.3 03/25/2025    MCV 84.5 03/25/2025     03/25/2025        Patient meets treatment conditions? Yes    DRUG SPECIFIC QUESTIONS:   - Up to date on all immunizations per patient report? Yes    Available Immunization Records:  Immunization History   Administered Date(s) Administered    Flu vaccine (IIV4), preservative free *Check age/dose* 09/17/2020    Influenza, injectable, quadrivalent 10/14/2020    PPD Test 02/24/2012, 03/02/2012, 09/20/2019    Pneumococcal conjugate vaccine, 20-valent (PREVNAR 20) 06/21/2023    Td (adult) 02/26/2011    Tdap vaccine, age 7 year and older (BOOSTRIX, ADACEL) 09/17/2020, 11/10/2020         - Any history of or new or worsening s/s of heart failure which may include dyspnea, edema? No  (If worsening s/s notify provider prior to " proceeding. Simponi may cause exacerbation of heart failure)     - Any new diagnosis of cancer, especially lymphomas? No    (Box Warning: Malignancy. If YES notify prescribing provider prior to proceeding )      REMINDER:  WEIGHT BASED DRUG    Recommended Vitals/Observation:  Vitals: Take vital signs prior to starting infusion, at infusion conclusion and as needed.   Observation: No observation.        Weight Based Drug Calculations:    WEIGHT BASED DRUGS: Golimumab (SIMPONI)   Patient's dosing weight (kg): 74.8     10% weight variance for prescribed treatment: 67.32 kg to 82.28 kg     Patient's weight today:   Vitals:    05/19/25 1117   Weight: 77.6 kg (171 lb)         weight range for prescribed dose:     Patient weight today falls outside of 10% variance or  weight range: No     Home Care pharmacist informed of weight variance: Not applicable    Doses that are weight based have an acceptable variance rule within 10% of the prescribed   order and/or within  weight range. If patient weight on day of infusion falls   outside of the 10% variance, or weight range, infusion is administered and   pharmacy contacted regarding future dosing adjustments, per policy.      Post Treatment: Patient tolerated treatment without issue and was discharged in no apparent distress.      Note Authored / Patient Cared for By: Lamonte Camacho RN     1225 Line flushed with NS 50 ml when infusion bag completed  _________________________________________________________________________    Note authored and patient cared for by: Lamonte Camacho RN   Note/Encounter reviewed by: Brook CHENG NP. This provider on site at time of patient infusion. Infusion staff to notify this provider of any questions, concerns, abnormals or issues during infusion.    Final check of medication completed by this PROSPER / or on-site pharmacist with administering nurse using positive identification prior to administration.  Final appearance of product checked for accuracy and conformity to the formula of the prepared product. Assured use of correct ingredients, accurate calculations and precise measurements under appropriate conditions and procedures.    No issues reported during today's encounter. Pt. tolerated infusion without difficulty. Pt. not independently evaluated by this provider during today's encounter.  (Brook CHENG NP)

## 2025-05-22 ENCOUNTER — PATIENT MESSAGE (OUTPATIENT)
Dept: RHEUMATOLOGY | Facility: CLINIC | Age: 45
End: 2025-05-22
Payer: COMMERCIAL

## 2025-05-22 ENCOUNTER — APPOINTMENT (OUTPATIENT)
Dept: ORTHOPEDIC SURGERY | Facility: HOSPITAL | Age: 45
End: 2025-05-22
Payer: COMMERCIAL

## 2025-05-23 ENCOUNTER — APPOINTMENT (OUTPATIENT)
Dept: INFUSION THERAPY | Facility: CLINIC | Age: 45
End: 2025-05-23
Payer: COMMERCIAL

## 2025-06-10 ENCOUNTER — TELEPHONE (OUTPATIENT)
Dept: ENDOCRINOLOGY | Facility: CLINIC | Age: 45
End: 2025-06-10
Payer: COMMERCIAL

## 2025-06-10 DIAGNOSIS — M06.09 POLYARTHRITIS WITH NEGATIVE RHEUMATOID FACTOR (MULTI): ICD-10-CM

## 2025-06-10 DIAGNOSIS — G89.29 OTHER CHRONIC PAIN: ICD-10-CM

## 2025-06-10 NOTE — TELEPHONE ENCOUNTER
Prescription Request           Has The Patient Been Identified By Name And Date Of Birth: Yes     RX Requestor: Patient     Date of Last Refill: 05/14/2025     Date Of Last Office Visit: 03/26/2025     Date Of Future Office Visit:  07/31/2025

## 2025-06-13 RX ORDER — OXYCODONE AND ACETAMINOPHEN 10; 325 MG/1; MG/1
1 TABLET ORAL EVERY 4 HOURS PRN
Qty: 120 TABLET | Refills: 0 | Status: SHIPPED | OUTPATIENT
Start: 2025-06-13

## 2025-06-16 ENCOUNTER — PATIENT OUTREACH (OUTPATIENT)
Dept: PRIMARY CARE | Facility: CLINIC | Age: 45
End: 2025-06-16
Payer: COMMERCIAL

## 2025-06-16 DIAGNOSIS — M06.09 RHEUMATOID ARTHRITIS OF MULTIPLE SITES WITHOUT RHEUMATOID FACTOR (MULTI): ICD-10-CM

## 2025-06-16 DIAGNOSIS — J45.909 ASTHMA, UNSPECIFIED ASTHMA SEVERITY, UNSPECIFIED WHETHER COMPLICATED, UNSPECIFIED WHETHER PERSISTENT (HHS-HCC): ICD-10-CM

## 2025-06-16 DIAGNOSIS — M19.90 OSTEOARTHRITIS, UNSPECIFIED OSTEOARTHRITIS TYPE, UNSPECIFIED SITE: ICD-10-CM

## 2025-06-16 SDOH — ECONOMIC STABILITY: FOOD INSECURITY: WITHIN THE PAST 12 MONTHS, THE FOOD YOU BOUGHT JUST DIDN'T LAST AND YOU DIDN'T HAVE MONEY TO GET MORE.: OFTEN TRUE

## 2025-06-16 SDOH — ECONOMIC STABILITY: INCOME INSECURITY: IN THE LAST 12 MONTHS, WAS THERE A TIME WHEN YOU WERE NOT ABLE TO PAY THE MORTGAGE OR RENT ON TIME?: YES

## 2025-06-16 SDOH — ECONOMIC STABILITY: FOOD INSECURITY: WITHIN THE PAST 12 MONTHS, YOU WORRIED THAT YOUR FOOD WOULD RUN OUT BEFORE YOU GOT MONEY TO BUY MORE.: OFTEN TRUE

## 2025-06-16 ASSESSMENT — SOCIAL DETERMINANTS OF HEALTH (SDOH): HOW HARD IS IT FOR YOU TO PAY FOR THE VERY BASICS LIKE FOOD, HOUSING, MEDICAL CARE, AND HEATING?: HARD

## 2025-06-16 NOTE — PROGRESS NOTES
Care Management Monthly Outreach  Chart review completed  Confirmation of at least 2 patient identifiers  Change in insurance? No    Has patient been to ER/Urgent Care since last outreach? No    Last Office Visit with PCP: 3/28/2024   Next Office Visit with PCP: 7/9/2025   APC Collaboration: n/a    Chronic Conditions and Outreach Summary:   Rheumatoid arthritis of multiple sites without rheumatoid factor (Multi)    Osteoarthritis, unspecified osteoarthritis type, unspecified site    Asthma, unspecified asthma severity, unspecified whether complicated, unspecified whether persistent ([facility])         Chart reviewed and call to patient who is trying her best to enjoy pleasant weather today.        RA/LUPUS: Unfortunately, she has been in flare since beginning of May.  She keeps track on calendar and finds that she has only felt well 5 days out of last 2.5 months.  Although her Symponi was increased frequency to every 4 weeks, she feels the dose needs increased.  Next infusion is later this week and will discuss at that time.       ASTHMA: Patient also admits to increased asthma symptoms with season change.  She has rescue inhaler but finds regular use of nebulizer is more effective.  She endorses SOB with minimal activity.       DEPRESSION: She continues to take old anti- depressant that was from previous provider and feels she wouldn't be able to function without them.  Encouraged to bring bottle or written name and dosage to next PCP appt on July 9th so she can order for her if appropriate.          FINANCIAL INSECURITIES: Patient continues to endorse financial insecurities but declines referral to  at this time.  She is working with employer on LTC and SSD applications.  States she has good family support.       [facility] will continue to follow.      Medications:   Are there medication changes since last visit? No  Refills needed? No    Social Drivers of Health: Addressed today  Care Gaps Addressed? Addressed in the  last 6 months  Care Plan addressed: Yes    Upcoming Appointments:   Future Appointments       Date / Time Provider Department Dept Phone    6/20/2025 1:00 PM TRI INFUSION 05  [facility]     6/30/2025 11:20 AM (Arrive by 11:05 AM) Ritchie Weber DO [facility] [phone]    7/9/2025 10:00 AM Dora Zamora MD [facility] [phone]    7/18/2025 1:00 PM TRI INFUSION 03  [facility]     7/31/2025 10:20 AM Enedina Hernandez MD [facility] [phone]    8/15/2025 1:00 PM TRI INFUSION 04  [facility]     9/12/2025 1:00 PM TRI INFUSION 05  [facility]     10/10/2025 1:00 PM TRI INFUSION 03  [facility]     11/7/2025 1:00 PM TRI INFUSION 04  [facility]           Blood Pressures Reviewed  BP Readings from Last 3 Encounters:   05/19/25 110/70   03/28/25 117/73   03/26/25 132/64     Labs Reviewed:  Lab Results   Component Value Date    CREATININE 0.67 03/25/2025    GLUCOSE 96 03/25/2025    ALKPHOS 49 03/25/2025    K 4.1 03/25/2025    PROT 6.8 03/25/2025     03/25/2025    CALCIUM 9.4 03/25/2025    AST 14 03/25/2025    ALT 13 03/25/2025    BUN 9 03/25/2025     Lab Results   Component Value Date    TRIG 158 (H) 07/11/2022    CHOL 297 (H) 07/11/2022    LDLCALC 194 (H) 07/11/2022    HDL 71 07/11/2022     Lab Results   Component Value Date    HGBA1C 5.2 02/02/2021     Lab Results   Component Value Date    WBC 5.8 03/25/2025    RBC 4.77 03/25/2025    HGB 13.0 03/25/2025     03/25/2025   No other concerns at this time.  Agreeable to continue monthly outreaches.  Encouraged to call if questions or concerns arise.    Deborah Leija

## 2025-06-20 ENCOUNTER — APPOINTMENT (OUTPATIENT)
Dept: INFUSION THERAPY | Facility: CLINIC | Age: 45
End: 2025-06-20
Payer: COMMERCIAL

## 2025-06-20 VITALS
HEART RATE: 70 BPM | RESPIRATION RATE: 18 BRPM | DIASTOLIC BLOOD PRESSURE: 58 MMHG | SYSTOLIC BLOOD PRESSURE: 129 MMHG | TEMPERATURE: 97.9 F | WEIGHT: 169 LBS | BODY MASS INDEX: 29.01 KG/M2

## 2025-06-20 DIAGNOSIS — M06.09 POLYARTHRITIS WITH NEGATIVE RHEUMATOID FACTOR (MULTI): ICD-10-CM

## 2025-06-20 DIAGNOSIS — G89.29 OTHER CHRONIC PAIN: ICD-10-CM

## 2025-06-20 LAB
25(OH)D3 SERPL-MCNC: 48 NG/ML (ref 30–100)
ALBUMIN SERPL BCP-MCNC: 4.4 G/DL (ref 3.4–5)
ALP SERPL-CCNC: 55 U/L (ref 33–110)
ALT SERPL W P-5'-P-CCNC: 19 U/L (ref 7–45)
AMPHETAMINES UR QL SCN: NORMAL
ANION GAP SERPL CALCULATED.3IONS-SCNC: 9 MMOL/L (ref 10–20)
APPEARANCE UR: ABNORMAL
AST SERPL W P-5'-P-CCNC: 17 U/L (ref 9–39)
BARBITURATES UR QL SCN: NORMAL
BASOPHILS # BLD AUTO: 0.04 X10*3/UL (ref 0–0.1)
BASOPHILS NFR BLD AUTO: 0.6 %
BENZODIAZ UR QL SCN: NORMAL
BILIRUB SERPL-MCNC: 0.4 MG/DL (ref 0–1.2)
BILIRUB UR STRIP.AUTO-MCNC: NEGATIVE MG/DL
BUN SERPL-MCNC: 10 MG/DL (ref 6–23)
BZE UR QL SCN: NORMAL
CALCIUM SERPL-MCNC: 9.6 MG/DL (ref 8.6–10.3)
CANNABINOIDS UR QL SCN: NORMAL
CHLORIDE SERPL-SCNC: 102 MMOL/L (ref 98–107)
CK SERPL-CCNC: 107 U/L (ref 0–215)
CO2 SERPL-SCNC: 28 MMOL/L (ref 21–32)
COLOR UR: YELLOW
CREAT SERPL-MCNC: 0.79 MG/DL (ref 0.5–1.05)
CRP SERPL-MCNC: <0.1 MG/DL
EGFRCR SERPLBLD CKD-EPI 2021: >90 ML/MIN/1.73M*2
EOSINOPHIL # BLD AUTO: 0.22 X10*3/UL (ref 0–0.7)
EOSINOPHIL NFR BLD AUTO: 3.4 %
ERYTHROCYTE [DISTWIDTH] IN BLOOD BY AUTOMATED COUNT: 12.4 % (ref 11.5–14.5)
ERYTHROCYTE [SEDIMENTATION RATE] IN BLOOD BY WESTERGREN METHOD: 9 MM/H (ref 0–20)
FENTANYL+NORFENTANYL UR QL SCN: NORMAL
GLUCOSE SERPL-MCNC: 101 MG/DL (ref 74–99)
GLUCOSE UR STRIP.AUTO-MCNC: NORMAL MG/DL
HCT VFR BLD AUTO: 44.3 % (ref 36–46)
HGB BLD-MCNC: 13.8 G/DL (ref 12–16)
IMM GRANULOCYTES # BLD AUTO: 0 X10*3/UL (ref 0–0.7)
IMM GRANULOCYTES NFR BLD AUTO: 0 % (ref 0–0.9)
KETONES UR STRIP.AUTO-MCNC: NEGATIVE MG/DL
LEUKOCYTE ESTERASE UR QL STRIP.AUTO: NEGATIVE
LYMPHOCYTES # BLD AUTO: 4.11 X10*3/UL (ref 1.2–4.8)
LYMPHOCYTES NFR BLD AUTO: 62.8 %
MCH RBC QN AUTO: 26.5 PG (ref 26–34)
MCHC RBC AUTO-ENTMCNC: 31.2 G/DL (ref 32–36)
MCV RBC AUTO: 85 FL (ref 80–100)
METHADONE UR QL SCN: NORMAL
MONOCYTES # BLD AUTO: 0.36 X10*3/UL (ref 0.1–1)
MONOCYTES NFR BLD AUTO: 5.5 %
NEUTROPHILS # BLD AUTO: 1.81 X10*3/UL (ref 1.2–7.7)
NEUTROPHILS NFR BLD AUTO: 27.7 %
NITRITE UR QL STRIP.AUTO: NEGATIVE
NRBC BLD-RTO: 0 /100 WBCS (ref 0–0)
OPIATES UR QL SCN: NORMAL
OXYCODONE+OXYMORPHONE UR QL SCN: NORMAL
PCP UR QL SCN: NORMAL
PH UR STRIP.AUTO: 5.5 [PH]
PLATELET # BLD AUTO: 379 X10*3/UL (ref 150–450)
POTASSIUM SERPL-SCNC: 4.4 MMOL/L (ref 3.5–5.3)
PROT SERPL-MCNC: 7.1 G/DL (ref 6.4–8.2)
PROT UR STRIP.AUTO-MCNC: NEGATIVE MG/DL
RBC # BLD AUTO: 5.2 X10*6/UL (ref 4–5.2)
RBC # UR STRIP.AUTO: NEGATIVE MG/DL
SODIUM SERPL-SCNC: 135 MMOL/L (ref 136–145)
SP GR UR STRIP.AUTO: 1.02
UROBILINOGEN UR STRIP.AUTO-MCNC: NORMAL MG/DL
WBC # BLD AUTO: 6.5 X10*3/UL (ref 4.4–11.3)

## 2025-06-20 PROCEDURE — 85025 COMPLETE CBC W/AUTO DIFF WBC: CPT

## 2025-06-20 PROCEDURE — 85652 RBC SED RATE AUTOMATED: CPT

## 2025-06-20 PROCEDURE — 82550 ASSAY OF CK (CPK): CPT

## 2025-06-20 PROCEDURE — 81003 URINALYSIS AUTO W/O SCOPE: CPT

## 2025-06-20 PROCEDURE — 82306 VITAMIN D 25 HYDROXY: CPT

## 2025-06-20 PROCEDURE — 86140 C-REACTIVE PROTEIN: CPT

## 2025-06-20 PROCEDURE — 80053 COMPREHEN METABOLIC PANEL: CPT

## 2025-06-20 PROCEDURE — 80307 DRUG TEST PRSMV CHEM ANLYZR: CPT

## 2025-06-20 RX ORDER — KETOROLAC TROMETHAMINE 30 MG/ML
30 INJECTION, SOLUTION INTRAMUSCULAR; INTRAVENOUS ONCE
Status: COMPLETED | OUTPATIENT
Start: 2025-06-20 | End: 2025-06-20

## 2025-06-20 RX ORDER — EPINEPHRINE 0.3 MG/.3ML
0.3 INJECTION SUBCUTANEOUS EVERY 5 MIN PRN
OUTPATIENT
Start: 2025-07-14

## 2025-06-20 RX ORDER — KETOROLAC TROMETHAMINE 30 MG/ML
30 INJECTION, SOLUTION INTRAMUSCULAR; INTRAVENOUS ONCE
OUTPATIENT
Start: 2025-07-14 | End: 2025-07-14

## 2025-06-20 RX ORDER — ALBUTEROL SULFATE 0.83 MG/ML
3 SOLUTION RESPIRATORY (INHALATION) AS NEEDED
Status: DISCONTINUED | OUTPATIENT
Start: 2025-06-20 | End: 2025-06-20 | Stop reason: HOSPADM

## 2025-06-20 RX ORDER — DIPHENHYDRAMINE HYDROCHLORIDE 50 MG/ML
50 INJECTION, SOLUTION INTRAMUSCULAR; INTRAVENOUS AS NEEDED
OUTPATIENT
Start: 2025-07-14

## 2025-06-20 RX ORDER — ALBUTEROL SULFATE 0.83 MG/ML
3 SOLUTION RESPIRATORY (INHALATION) AS NEEDED
OUTPATIENT
Start: 2025-07-14

## 2025-06-20 RX ORDER — DIPHENHYDRAMINE HYDROCHLORIDE 50 MG/ML
50 INJECTION, SOLUTION INTRAMUSCULAR; INTRAVENOUS AS NEEDED
Status: DISCONTINUED | OUTPATIENT
Start: 2025-06-20 | End: 2025-06-20 | Stop reason: HOSPADM

## 2025-06-20 RX ORDER — FAMOTIDINE 10 MG/ML
20 INJECTION, SOLUTION INTRAVENOUS ONCE AS NEEDED
Status: DISCONTINUED | OUTPATIENT
Start: 2025-06-20 | End: 2025-06-20 | Stop reason: HOSPADM

## 2025-06-20 RX ORDER — EPINEPHRINE 0.3 MG/.3ML
0.3 INJECTION SUBCUTANEOUS EVERY 5 MIN PRN
Status: DISCONTINUED | OUTPATIENT
Start: 2025-06-20 | End: 2025-06-20 | Stop reason: HOSPADM

## 2025-06-20 RX ORDER — FAMOTIDINE 10 MG/ML
20 INJECTION, SOLUTION INTRAVENOUS ONCE AS NEEDED
OUTPATIENT
Start: 2025-07-14

## 2025-06-20 RX ADMIN — KETOROLAC TROMETHAMINE 30 MG: 30 INJECTION, SOLUTION INTRAMUSCULAR; INTRAVENOUS at 13:25

## 2025-06-20 ASSESSMENT — ENCOUNTER SYMPTOMS
NECK PAIN: 1
WOUND: 0
LIGHT-HEADEDNESS: 0
CHILLS: 0
BLOOD IN STOOL: 0
EXTREMITY WEAKNESS: 0
SHORTNESS OF BREATH: 0
UNEXPECTED WEIGHT CHANGE: 0
DIARRHEA: 0
NAUSEA: 0
VOMITING: 0
DIZZINESS: 0
FEVER: 0
MYALGIAS: 1
PALPITATIONS: 0
APPETITE CHANGE: 0
BRUISES/BLEEDS EASILY: 0
EYE PROBLEMS: 1
LEG SWELLING: 0
FREQUENCY: 0
HEADACHES: 0
WHEEZING: 0
ABDOMINAL PAIN: 0
ARTHRALGIAS: 1
CONSTIPATION: 0
HEMATURIA: 0
COUGH: 0
FATIGUE: 1
DYSURIA: 0

## 2025-06-20 ASSESSMENT — PAIN SCALES - GENERAL: PAINLEVEL_OUTOF10: 8

## 2025-06-20 NOTE — PROGRESS NOTES
[facility]   Infusion Clinic Note   Date: 2025   Name: Susan Ford  : 1980   MRN: [id number]         Reason for Visit: OP Infusion (Simponi 150 mg every 28 days)         Today: We administered methylPREDNISolone sod succinate and ketorolac.       Ordered By: Enedina Clements,*       For a Diagnosis of: Polyarthritis with negative rheumatoid factor (Multi)    Other chronic pain       At today's visit patient accompanied by:       Today's Vitals:   Vitals:    25 1303 25 1412   BP: 111/75 129/58   Pulse: 58 70   Resp: 18 18   Temp: 36.4 °C (97.5 °F) 36.6 °C (97.9 °F)   SpO2: Comment: no oxy due nail length    Weight: 76.7 kg (169 lb)    PainSc:   8    PainLoc: Generalized    LMP: 2025             Pre - Treatment Checklist:      - Previous reaction to current treatment: no      (Assess patient for the concerns below. Document provider notification as appropriate).  - Active or recent infection with/without current antibiotic use: no  - Recent or planned invasive dental work: no  - Recent or planned surgeries: no  - Recently received or plans to receive vaccinations: no  - Has treatment related toxicities: no  - Any chance may be pregnant:  no. LMP 2025      Pain: 8   - Is the pain different from normal: no   - Is prescribing Doctor aware:  yes      Labs: Labs drawn and sent per order      Fall Risk Screening: Yosvany Fall Risk  History of Falling, Immediate or Within 3 Months: Yes  Ambulatory Aid: Walks without aid/bedrest/nurse assist  Intravenous Therapy/Heparin Lock: Yes  Gait/Transferring: Normal/bedrest/immobile  Mental Status: Oriented to own ability       Review Of Systems:  Review of Systems   Constitutional:  Positive for fatigue. Negative for appetite change, chills, fever and unexpected weight change.   HENT:   Negative for hearing loss.    Eyes:  Positive for eye problems.        WEARS GLASSES   Respiratory:  Negative for cough,  shortness of breath and wheezing.    Cardiovascular:  Negative for chest pain, leg swelling and palpitations.   Gastrointestinal:  Negative for abdominal pain, blood in stool, constipation, diarrhea, nausea and vomiting.   Genitourinary:  Negative for dysuria, frequency and hematuria.    Musculoskeletal:  Positive for arthralgias, myalgias and neck pain. Negative for gait problem.        Chronic neck and hands pain   Skin:  Negative for itching, rash and wound.   Neurological:  Negative for dizziness, extremity weakness, gait problem, headaches and light-headedness.   Hematological:  Does not bruise/bleed easily.         Infusion Readiness:  - Assessment Concerns Related to Infusion: No  - Provider notified: no      New Patient Education:    N/A (returning patient for continuation of therapy. Ongoing education provided as needed.)        Treatment Conditions & Drug Specific Questions:    Golimumab  (SIMPONI)    (Unless otherwise specified on patient specific therapy plan):     TREATMENT CONDITIONS:  Unless otherwise specified on patient specific thearpy plan HOLD and notify provider prior to proceeding with today's infusion if patient with:  o Positive T-Spot  o Positive Hepatitis B Surface Ag / Hepatitis C     Lab Results   Component Value Date    TBGRES Negative  Reference range: NEGATIVE   10/14/2020    TSPOTR  09/12/2023     Negative  Reference range: Normal Value: Negative    A negative test result does not exclude the possibility of exposure  to   or infection with Mycobacterium tuberculosis (M. tuberculosis).    Patients with recent exposure to TB infected individuals exhibiting a   negative T-SPOT.TB result should be considered for retesting within 6   weeks or if other relevant clinical symptoms indicate.  Results from   T-SPOT.TB testing must be used in conjunction with each individual's   epidemiological history, current medical status, and results of other   diagnostic evaluations.  The T-SPOT.TB test is  "qualitative and  results   are reported as positive, borderline or negative, given that the test   controls perform as expected. In line with the Centers for Disease   Control and Prevention's 2010 recommendation to report quantitative   measurements alongside the qualitative result, the laboratory  provides   spot counts for informational purposes only.  The T-SPOT.TB test  should   not be interpreted as a quantitative test.  Test performed at:  [facility] [address] [facility] [id number]        Lab Results   Component Value Date    HEPBSAG NEGATIVE 10/14/2020      No results found for: \"NONUHFIRE\", \"NONUHSWGH\", \"NONUHFISH\"  Lab Results   Component Value Date    HEPCAB  10/14/2020     Negative  Reference range: NEGATIVE  Performed at the [facility] unless   otherwise noted.        Lab Results   Component Value Date    HEPCAB  10/14/2020     Negative  Reference range: NEGATIVE  Performed at the [facility] unless   otherwise noted.       No results found for: \"HBCTI\", \"HEPBCAB\"    Lab Results   Component Value Date    WBC 6.5 06/20/2025    HGB 13.8 06/20/2025    HCT 44.3 06/20/2025    MCV 85 06/20/2025     06/20/2025        Patient meets treatment conditions? Yes    DRUG SPECIFIC QUESTIONS:   - Up to date on all immunizations per patient report? Yes    Available Immunization Records:  Immunization History   Administered Date(s) Administered    Flu vaccine (IIV4), preservative free *Check age/dose* 09/17/2020    Influenza, injectable, quadrivalent 10/14/2020    PPD Test 02/24/2012, 03/02/2012, 09/20/2019    Pneumococcal conjugate vaccine, 20-valent (PREVNAR 20) 06/21/2023    Td (adult) 02/26/2011    Tdap vaccine, age 7 year and older (BOOSTRIX, ADACEL) 09/17/2020, 11/10/2020         - Any history of or new or worsening s/s of heart failure which may include dyspnea, edema? No  (If worsening s/s notify provider prior to proceeding. Simponi may cause " exacerbation of heart failure)     - Any new diagnosis of cancer, especially lymphomas? No    (Box Warning: Malignancy. If YES notify prescribing provider prior to proceeding )      REMINDER:  WEIGHT BASED DRUG    Recommended Vitals/Observation:  Vitals: Take vital signs prior to starting infusion, at infusion conclusion and as needed.   Observation: No observation.  Dosing weight: 74.8 kg     10% weight variance by prescribed treatment:  67.32 kg- 82.28 kg   Patient weight today falls outside of 10% variance - No  Pharmacist informed: No     Weight Based Drug Calculations:    WEIGHT BASED DRUGS: NOT APPLICABLE / FLAT DOSE       Post Treatment: Patient tolerated treatment without issue and was discharged in no apparent distress.  RTC ON 7/18/2025.      Note Authored / Patient Cared for By: Brittny Hwang RN

## 2025-06-20 NOTE — PATIENT INSTRUCTIONS
Today :Susan Ford had no medications administered during this visit.     For:   1. Polyarthritis with negative rheumatoid factor (Multi)         Your next appointment is due in:  7/18/2025        Please read the  Medication Guide that was given to you and reviewed during todays visit.     (Tell all doctors including dentists that you are taking this medication)     Go to the emergency room or call 911 if:  -You have signs of allergic reaction:   -Rash, hives, itching.   -Swollen, blistered, peeling skin.   -Swelling of face, lips, mouth, tongue or throat.   -Tightness of chest, trouble breathing, swallowing or talking     Call your doctor:  - If IV / injection site gets red, warm, swollen, itchy or leaks fluid or pus.     (Leave dressing on your IV site for at least 2 hours and keep area clean and dry  - If you get sick or have symptoms of infection or are not feeling well for any reason.    (Wash your hands often, stay away from people who are sick)  - If you have side effects from your medication that do not go away or are bothersome.     (Refer to the teaching your nurse gave you for side effects to call your doctor about)    - Common side effects may include:  stuffy nose, headache, feeling tired, muscle aches, upset stomach  - Before receiving any vaccines     - Call the [facility] at   If:  - You get sick, are on antibiotics, have had a recent vaccine, have surgery or dental work and your doctor wants your visit rescheduled.  - You need to cancel and reschedule your visit for any reason. Call at least 2 days before your visit if you need to cancel.   - Your insurance changes before your next visit.    (We will need to get approval from your new insurance. This can take up to two weeks.)     The [facility] is opened Monday thru Friday. We are closed on weekends and holidays.   Voice mail will take your call if the center is closed. If you leave a message please allow 24  hours for a call back during weekdays. If you leave a message on a weekend/holiday, we will call you back the next business day.    A pharmacist is available Monday - Friday from 8:30AM to 3:30PM to help answer any questions you may have about your prescriptions(s). Please call pharmacy at:    [facility]: [phone]  [facility]: [phone]  [facility]: [phone]

## 2025-06-30 ENCOUNTER — OFFICE VISIT (OUTPATIENT)
Dept: ORTHOPEDIC SURGERY | Facility: HOSPITAL | Age: 45
End: 2025-06-30
Payer: COMMERCIAL

## 2025-06-30 DIAGNOSIS — G56.22 CUBITAL TUNNEL SYNDROME ON LEFT: ICD-10-CM

## 2025-06-30 DIAGNOSIS — M77.12 LEFT LATERAL EPICONDYLITIS: Primary | ICD-10-CM

## 2025-06-30 DIAGNOSIS — M77.02 MEDIAL EPICONDYLITIS OF LEFT ELBOW: ICD-10-CM

## 2025-06-30 PROCEDURE — 99214 OFFICE O/P EST MOD 30 MIN: CPT | Performed by: EMERGENCY MEDICINE

## 2025-06-30 PROCEDURE — 99212 OFFICE O/P EST SF 10 MIN: CPT | Performed by: EMERGENCY MEDICINE

## 2025-06-30 ASSESSMENT — PAIN - FUNCTIONAL ASSESSMENT: PAIN_FUNCTIONAL_ASSESSMENT: 0-10

## 2025-06-30 ASSESSMENT — PAIN SCALES - GENERAL: PAINLEVEL_OUTOF10: 5 - MODERATE PAIN

## 2025-06-30 NOTE — PROGRESS NOTES
Subjective   Patient ID: Susan Ford is a 45 y.o. female who presents for Follow-up and Post-op of the Left Elbow (Lt lat elbow percutaneous tenotomy, 02/19/2025).  History of Present Illness  The patient returns today with a complaint of left elbow pain, specifically left lateral elbow pain. She underwent a left elbow common extensor tendon percutaneous tenotomy procedure on 02/19/2025 but did not follow up for her 6-week appointment and has not done therapy. She presents today due to continued pain.    She reports persistent pain in her left elbow, particularly on the lateral side, and also points to the medial aspect of the elbow. The pain intensifies when she moves her hand. She also experiences weakness in the arm. She has not engaged in any therapy due to concurrent knee issues and a lack of occupational therapy services at her previous location. Her rheumatologist suggested that her post-surgical symptoms could be attributed to lupus and rheumatoid arthritis (RA). She believes that these conditions are severely affecting her muscles and joints, leading to significant reactions post-surgery. She is not currently on any medication for these conditions but plans to revisit her rheumatologist. She has been experiencing numbness in her pinky and ring finger, which she attributes to ulnar nerve issues. She does not report any clicking sounds in her elbow. She recalls an incident where she slipped in the tub, resulting in severe wrist pain. She mentions that she should use a shower chair instead of taking a bath. She has not undergone any new x-rays. She has noticed a slower healing process post-surgery.    All other systems have been reviewed and are negative for complaint.      Objective     LMP 06/05/2025 (Exact Date)      Physical Exam  - Musculoskeletal:    - Left elbow:      - Full range of motion in all planes      - Tenderness over the lateral epicondyle and medial epicondyle      - Negative valgus and  varus stress testing      - Pain with resisted wrist extension and over the lateral epicondyle with resisted extension      - Pain over the medial epicondyle with resisted flexion    - Other:      - Positive Tinel's over the cubital tunnel      - No palpable subluxation of the ulnar nerve      - Gross sensation intact throughout      - Good  strength      1. Left lateral epicondylitis  Referral to Occupational Therapy      2. Medial epicondylitis of left elbow  Referral to Occupational Therapy      3. Cubital tunnel syndrome on left  Referral to Occupational Therapy          Assessment & Plan  1. Left lateral elbow pain:  - Continues to experience pain following common extensor tendon percutaneous tenotomy procedure on 02/19/2025  - Missed 6-week follow-up appointment and has not undergone therapy  - Physical exam: tenderness over lateral epicondyle, pain with resisted wrist extension  - Importance of therapy for recovery discussed  - Impact of rheumatoid arthritis and lupus on healing discussed  - Referral for physical therapy made    2. Left medial elbow pain:  - Reports new onset of pain  - Physical exam: tenderness over medial epicondyle, pain with resisted flexion  - Influence of rheumatoid arthritis and lupus on muscle and joint health discussed  - Referral for physical therapy made    3. Cubital tunnel syndrome:  - Reports numbness in pinky and ring finger, indicating possible cubital tunnel syndrome  - Physical exam: positive Tinel's sign over cubital tunnel, no palpable subluxation of ulnar nerve  - Potential for ulnar nerve irritation discussed  - Benefits of therapy discussed  - Referral for physical therapy made    Ritchie Weber DO         This medical note was created with the assistance of artificial intelligence (AI) for documentation purposes. The content has been reviewed and confirmed by the healthcare provider for accuracy and completeness. Patient consented to the use of audio recording  and use of AI during their visit.

## 2025-07-09 ENCOUNTER — APPOINTMENT (OUTPATIENT)
Dept: PRIMARY CARE | Facility: CLINIC | Age: 45
End: 2025-07-09
Payer: COMMERCIAL

## 2025-07-09 VITALS
WEIGHT: 169 LBS | TEMPERATURE: 97.3 F | DIASTOLIC BLOOD PRESSURE: 80 MMHG | SYSTOLIC BLOOD PRESSURE: 138 MMHG | BODY MASS INDEX: 28.85 KG/M2 | HEART RATE: 64 BPM | HEIGHT: 64 IN | OXYGEN SATURATION: 99 %

## 2025-07-09 DIAGNOSIS — J31.0 CHRONIC RHINITIS: ICD-10-CM

## 2025-07-09 DIAGNOSIS — Z91.030 BEE STING ALLERGY: ICD-10-CM

## 2025-07-09 DIAGNOSIS — J45.909 ASTHMA, UNSPECIFIED ASTHMA SEVERITY, UNSPECIFIED WHETHER COMPLICATED, UNSPECIFIED WHETHER PERSISTENT (HHS-HCC): ICD-10-CM

## 2025-07-09 DIAGNOSIS — M06.09 POLYARTHRITIS WITH NEGATIVE RHEUMATOID FACTOR (MULTI): ICD-10-CM

## 2025-07-09 DIAGNOSIS — F41.9 ANXIETY AND DEPRESSION: ICD-10-CM

## 2025-07-09 DIAGNOSIS — Z12.11 ENCOUNTER FOR SCREENING FOR MALIGNANT NEOPLASM OF COLON: ICD-10-CM

## 2025-07-09 DIAGNOSIS — E55.9 VITAMIN D DEFICIENCY: ICD-10-CM

## 2025-07-09 DIAGNOSIS — F32.A ANXIETY AND DEPRESSION: ICD-10-CM

## 2025-07-09 DIAGNOSIS — Z00.00 ANNUAL PHYSICAL EXAM: Primary | ICD-10-CM

## 2025-07-09 DIAGNOSIS — Z13.6 SCREENING FOR CARDIOVASCULAR CONDITION: ICD-10-CM

## 2025-07-09 DIAGNOSIS — R42 VERTIGO: ICD-10-CM

## 2025-07-09 PROCEDURE — 1036F TOBACCO NON-USER: CPT | Performed by: STUDENT IN AN ORGANIZED HEALTH CARE EDUCATION/TRAINING PROGRAM

## 2025-07-09 PROCEDURE — 99214 OFFICE O/P EST MOD 30 MIN: CPT | Mod: 25 | Performed by: STUDENT IN AN ORGANIZED HEALTH CARE EDUCATION/TRAINING PROGRAM

## 2025-07-09 PROCEDURE — 99396 PREV VISIT EST AGE 40-64: CPT | Performed by: STUDENT IN AN ORGANIZED HEALTH CARE EDUCATION/TRAINING PROGRAM

## 2025-07-09 PROCEDURE — 3008F BODY MASS INDEX DOCD: CPT | Performed by: STUDENT IN AN ORGANIZED HEALTH CARE EDUCATION/TRAINING PROGRAM

## 2025-07-09 PROCEDURE — 99214 OFFICE O/P EST MOD 30 MIN: CPT | Performed by: STUDENT IN AN ORGANIZED HEALTH CARE EDUCATION/TRAINING PROGRAM

## 2025-07-09 RX ORDER — FEXOFENADINE HCL 180 MG/1
180 TABLET ORAL DAILY
Qty: 90 TABLET | Refills: 1 | Status: SHIPPED | OUTPATIENT
Start: 2025-07-09 | End: 2026-01-05

## 2025-07-09 RX ORDER — MECLIZINE HYDROCHLORIDE 25 MG/1
25 TABLET ORAL 3 TIMES DAILY PRN
Qty: 90 TABLET | Refills: 3 | Status: SHIPPED | OUTPATIENT
Start: 2025-07-09

## 2025-07-09 RX ORDER — SERTRALINE HYDROCHLORIDE 50 MG/1
50 TABLET, FILM COATED ORAL DAILY
Qty: 30 TABLET | Refills: 1 | Status: SHIPPED | OUTPATIENT
Start: 2025-07-09 | End: 2025-09-07

## 2025-07-09 RX ORDER — BENZONATATE 100 MG/1
100 CAPSULE ORAL 3 TIMES DAILY PRN
Qty: 20 CAPSULE | Refills: 0 | Status: SHIPPED | OUTPATIENT
Start: 2025-07-09

## 2025-07-09 RX ORDER — CYCLOBENZAPRINE HCL 10 MG
10 TABLET ORAL AS NEEDED
Qty: 90 TABLET | Refills: 3 | Status: CANCELLED | OUTPATIENT
Start: 2025-07-09

## 2025-07-09 RX ORDER — EPINEPHRINE 0.3 MG/.3ML
1 INJECTION INTRAMUSCULAR ONCE AS NEEDED
Qty: 2 EACH | Refills: 1 | Status: SHIPPED | OUTPATIENT
Start: 2025-07-09

## 2025-07-09 ASSESSMENT — ANXIETY QUESTIONNAIRES
4. TROUBLE RELAXING: NEARLY EVERY DAY
1. FEELING NERVOUS, ANXIOUS, OR ON EDGE: NEARLY EVERY DAY
2. NOT BEING ABLE TO STOP OR CONTROL WORRYING: NEARLY EVERY DAY
5. BEING SO RESTLESS THAT IT IS HARD TO SIT STILL: NEARLY EVERY DAY
7. FEELING AFRAID AS IF SOMETHING AWFUL MIGHT HAPPEN: NEARLY EVERY DAY
3. WORRYING TOO MUCH ABOUT DIFFERENT THINGS: NEARLY EVERY DAY
GAD7 TOTAL SCORE: 20
IF YOU CHECKED OFF ANY PROBLEMS ON THIS QUESTIONNAIRE, HOW DIFFICULT HAVE THESE PROBLEMS MADE IT FOR YOU TO DO YOUR WORK, TAKE CARE OF THINGS AT HOME, OR GET ALONG WITH OTHER PEOPLE: EXTREMELY DIFFICULT
6. BECOMING EASILY ANNOYED OR IRRITABLE: MORE THAN HALF THE DAYS

## 2025-07-09 ASSESSMENT — PATIENT HEALTH QUESTIONNAIRE - PHQ9
2. FEELING DOWN, DEPRESSED OR HOPELESS: NEARLY EVERY DAY
7. TROUBLE CONCENTRATING ON THINGS, SUCH AS READING THE NEWSPAPER OR WATCHING TELEVISION: NEARLY EVERY DAY
4. FEELING TIRED OR HAVING LITTLE ENERGY: NEARLY EVERY DAY
8. MOVING OR SPEAKING SO SLOWLY THAT OTHER PEOPLE COULD HAVE NOTICED. OR THE OPPOSITE, BEING SO FIGETY OR RESTLESS THAT YOU HAVE BEEN MOVING AROUND A LOT MORE THAN USUAL: NEARLY EVERY DAY
SUM OF ALL RESPONSES TO PHQ QUESTIONS 1-9: 24
3. TROUBLE FALLING OR STAYING ASLEEP OR SLEEPING TOO MUCH: NEARLY EVERY DAY
6. FEELING BAD ABOUT YOURSELF - OR THAT YOU ARE A FAILURE OR HAVE LET YOURSELF OR YOUR FAMILY DOWN: NEARLY EVERY DAY
5. POOR APPETITE OR OVEREATING: NEARLY EVERY DAY
1. LITTLE INTEREST OR PLEASURE IN DOING THINGS: NEARLY EVERY DAY
9. THOUGHTS THAT YOU WOULD BE BETTER OFF DEAD, OR OF HURTING YOURSELF: NOT AT ALL
SUM OF ALL RESPONSES TO PHQ9 QUESTIONS 1 AND 2: 6

## 2025-07-09 ASSESSMENT — PAIN SCALES - GENERAL: PAINLEVEL_OUTOF10: 7

## 2025-07-09 NOTE — PROGRESS NOTES
Subjective   Susan Ford is a 45 y.o. female who presents for Annual Exam.    HPI:      PREVENTATIVE HEALTH CARE:    Immunizations:  COVID:  DUE  TDaP:  utd  Pneumonia:  utd  Shingles:  not currently indicated.    Immunization History   Administered Date(s) Administered    Flu vaccine (IIV4), preservative free *Check age/dose* 09/17/2020    Influenza, injectable, quadrivalent 10/14/2020    PPD Test 02/24/2012, 03/02/2012, 09/20/2019    Pneumococcal conjugate vaccine, 20-valent (PREVNAR 20) 06/21/2023    Td (adult) 02/26/2011    Tdap vaccine, age 7 year and older (BOOSTRIX, ADACEL) 09/17/2020, 11/10/2020       Screenings:  HIV/HCV:  negative x2 2/3/2024  Pap:  2/3/2024 wnl, HPV negative  Mammogram:  5/2/2025 wnl - utd  Colonoscopy:  DUE - agreeable to Cologuard  Lung: Never smoker    Polyarthritis:  Follows with rheumatology (Enedina Hernandez MD).      Sees ortho for knee    Anxiety/Depression:  Currently sees a therapist virtually with remote growth therapy, interested in starting medication for this issue as well.  Has been seeing therapy since February this year, feels like symptoms started worsening around this time.  She believes her symptoms are largely related to her chronic health concerns.  She expresses frustration that she is now under medical management for all of her concerns and taking medications as prescribed but still chronically symptomatic and with mobility issues.  She denies suicidal ideation.    GAD7 and PHQ9:  Over the past 2 weeks, how often have you been bothered by any of the following problems?  Trouble falling or staying asleep, or sleeping too much: Nearly every day  Feeling tired or having little energy: Nearly every day  Poor appetite or overeating: Nearly every day  Feeling bad about yourself - or that you are a failure or have let yourself or your family down: Nearly every day  Trouble concentrating on things, such as reading the newspaper or watching television: Nearly  "every day  Moving or speaking so slowly that other people could have noticed? Or the opposite - being so fidgety or restless that you have been moving around a lot more than usual.: Nearly every day  Thoughts that you would be better off dead or hurting yourself in some way: Not at all  Patient Health Questionnaire-9 Score: 24  Over the last 2 weeks, how often have you been bothered by any of the following problems?  Feeling nervous, anxious, or on edge: Nearly every day  Not being able to stop or control worrying: Nearly every day  Worrying too much about different things: Nearly every day  Trouble relaxing: Nearly every day  Being so restless that it is hard to sit still: Nearly every day  Becoming easily annoyed or irritable: More than half the days  Feeling afraid as if something awful might happen: Nearly every day  CHARMAINE-7 Total Score: 20            ROS:    Review of systems is essentially negative for all systems except for any identified issues in HPI above.    Objective     /80   Pulse 64   Temp 36.3 °C (97.3 °F)   Ht 1.626 m (5' 4\")   Wt 76.7 kg (169 lb)   LMP 06/05/2025 (Exact Date)   SpO2 99%   BMI 29.01 kg/m²      PHYSICAL EXAM    GENERAL  Well-appearing, pleasant and cooperative.  No acute distress.    HEENT  HEAD:   Normocephalic.  Atraumatic.  EYES:  PERRLA.  No scleral icterus or conjunctival injection.  EARS:  Tympanic membranes visualized bilaterally without erythema, fluid, or bulging.  NECK:  No adenopathy.  No palpable thyroid enlargement or nodules.    THROAT:  Moist oropharynx without tonsillar enlargement or exudates.    LUNGS:    Clear to auscultation bilaterally.  No wheezes, rales, rhonchi.    CARDIAC:  Regular rate and rhythm.  Normal S1S2.  No murmurs/rubs/gallops.    ABDOMEN:  Soft, non-tender, non-distended.  No hepatosplenomegaly.  Normoactive bowel sounds.    MUSCULOSKELETAL:  Hand swelling bilaterally, diffusely in fingers and MCP joints.  No increased warmth or " significant TTP.  No other gross abnormalities.      EXTREMITIES:  No LE edema or cyanosis.      NEURO           Alert and oriented x3. No focal deficits.    PSYCH:          Affect appropriate.           Assessment/Plan   Problem List Items Addressed This Visit       Polyarthritis with negative rheumatoid factor (Multi)    Chronic rhinitis    Relevant Medications    fexofenadine (Allegra) 180 mg tablet    Asthma    Relevant Medications    benzonatate (Tessalon) 100 mg capsule    Vitamin D deficiency    Relevant Orders    Vitamin D 25-Hydroxy,Total (for eval of Vitamin D levels)     Other Visit Diagnoses         Annual physical exam    -  Primary    Relevant Orders    TSH with reflex to Free T4 if abnormal    Lipid Panel    CBC    Comprehensive Metabolic Panel      Screening for cardiovascular condition        Relevant Orders    Lipid Panel      Encounter for screening for malignant neoplasm of colon        Relevant Orders    Cologuard® colon cancer screening      Vertigo        Relevant Medications    meclizine (Antivert) 25 mg tablet      Anxiety and depression        Relevant Medications    meclizine (Antivert) 25 mg tablet    sertraline (Zoloft) 50 mg tablet    Other Relevant Orders    Follow Up In Primary Care - Established      Bee sting allergy        Relevant Medications    EPINEPHrine (EpiPen 2-Milan) 0.3 mg/0.3 mL injection syringe            Patient Instructions   Thank you for coming to see me today.    Go to the lab for fasting blood work, we will notify you of all results.    New anxiety/depression medication, sertraline 50 mg, sent to pharmacy.  Take daily as prescribed.    Your muscle relaxer (cyclobenzaprine/Flexeril) is filled by your rheumatologist, Dr. Block.    Other requested refills sent to your pharmacy.    Follow-up with me in 1 month for anxiety/depression and medication review, sooner if needed.    Counseling:   Medication education:    Education: The patient is counseled regarding  potential side effects of all new medications.    Understanding:  Patient expressed understanding.    Adherence:  No barriers to adherence identified.         Dora Zamora MD

## 2025-07-09 NOTE — PATIENT INSTRUCTIONS
Thank you for coming to see me today.    Go to the lab for fasting blood work, we will notify you of all results.    New anxiety/depression medication, sertraline 50 mg, sent to pharmacy.  Take daily as prescribed.    Your muscle relaxer (cyclobenzaprine/Flexeril) is filled by your rheumatologist, Dr. Block.    Other requested refills sent to your pharmacy.    Follow-up with me in 1 month for anxiety/depression and medication review, sooner if needed.

## 2025-07-15 ENCOUNTER — PATIENT OUTREACH (OUTPATIENT)
Dept: PRIMARY CARE | Facility: CLINIC | Age: 45
End: 2025-07-15
Payer: COMMERCIAL

## 2025-07-15 DIAGNOSIS — M06.09 POLYARTHRITIS WITH NEGATIVE RHEUMATOID FACTOR (MULTI): ICD-10-CM

## 2025-07-15 DIAGNOSIS — G89.29 OTHER CHRONIC PAIN: ICD-10-CM

## 2025-07-15 DIAGNOSIS — F41.9 ANXIETY AND DEPRESSION: ICD-10-CM

## 2025-07-15 DIAGNOSIS — J45.909 ASTHMA, UNSPECIFIED ASTHMA SEVERITY, UNSPECIFIED WHETHER COMPLICATED, UNSPECIFIED WHETHER PERSISTENT (HHS-HCC): ICD-10-CM

## 2025-07-15 DIAGNOSIS — F32.A ANXIETY AND DEPRESSION: ICD-10-CM

## 2025-07-15 NOTE — PROGRESS NOTES
Care Management Monthly Outreach  Chart review completed    Last Office Visit: 7/9/2025  Next Office Visit: Visit date not found   APC: n/a    Has patient been to ER/Urgent Care since last outreach? No    Outreach attempted: unable to leave message on voicemail.    Deborah Leija

## 2025-07-17 ENCOUNTER — PATIENT OUTREACH (OUTPATIENT)
Dept: PRIMARY CARE | Facility: CLINIC | Age: 45
End: 2025-07-17
Payer: COMMERCIAL

## 2025-07-17 DIAGNOSIS — J45.909 ASTHMA, UNSPECIFIED ASTHMA SEVERITY, UNSPECIFIED WHETHER COMPLICATED, UNSPECIFIED WHETHER PERSISTENT (HHS-HCC): ICD-10-CM

## 2025-07-17 DIAGNOSIS — M06.09 RHEUMATOID ARTHRITIS OF MULTIPLE SITES WITHOUT RHEUMATOID FACTOR (MULTI): ICD-10-CM

## 2025-07-17 RX ORDER — OXYCODONE AND ACETAMINOPHEN 10; 325 MG/1; MG/1
1 TABLET ORAL EVERY 6 HOURS PRN
Qty: 120 TABLET | Refills: 0 | Status: SHIPPED | OUTPATIENT
Start: 2025-07-17

## 2025-07-17 NOTE — PROGRESS NOTES
" Care Management Monthly Outreach  Chart review completed  Confirmation of at least 2 patient identifiers  Change in insurance? No    Has patient been to ER/Urgent Care since last outreach? No    Last Office Visit with PCP: 7/9/2025   Next Office Visit with PCP: Visit date not found   APC Collaboration: n/a    Chronic Conditions and Outreach Summary:   Asthma, unspecified asthma severity, unspecified whether complicated, unspecified whether persistent ([facility])    Rheumatoid arthritis of multiple sites without rheumatoid factor (Multi)         Outreach call to patient after chart review.  She reports no significant changes to RA or Lupus having had 6 good days in last month; previous month was 5.  Scheduled for Symponi infusion tomorrow where labs (except Lipid Profile) can be drawn.  Next office visit with Dr. Garcia is 7/31/2025 where she plans to discuss increasing dose to see if it would provide more good days a month.       She is now on Sertraline and voiced understanding that it can take 4-6 weeks before effects are seen.  She was feeling very \"overloaded\" last month and did 5 day fast from all medications and certain foods.  She did report feeling a little better and added items back in one at a time to look for any triggers.       Patient reports increase in falls including one getting out of tub when wrist gave out.  She acknowledges need for grab bar and directed her to her Waiver coordinator as they should cover in home safety items.  Ortho surgeon provided Rx for OT at [facility] but closest one is at [facility].  She is going to check with [employer] which is agency her daughter/aide is employed through to see if they will accept the prescription.  Due to falls and weakness, it would be difficult to attend outpatient PT.        CCM will continue to follow.    Medications:   Are there medication changes since last visit? No  Refills needed? No    Social Drivers of Health: Addressed in the last 6 months  Care " Gaps Addressed? Addressed in the last 6 months  Care Plan addressed: No    Upcoming Appointments:   Future Appointments       Date / Time Provider Department Dept Phone    7/18/2025 1:00 PM [facility] 03  [facility]     7/31/2025 10:20 AM Enedina Hernandez MD [facility] [phone]    8/15/2025 1:00 PM [facility] 04  [facility]     9/12/2025 1:00 PM [facility] 04  [facility]     10/10/2025 1:00 PM [facility] 04  [facility]     11/7/2025 1:00 PM [facility] 04  [facility]           Blood Pressures Reviewed  BP Readings from Last 3 Encounters:   07/09/25 138/80   06/20/25 129/58   05/19/25 110/70     Labs Reviewed:  Lab Results   Component Value Date    CREATININE 0.79 06/20/2025    GLUCOSE 101 (H) 06/20/2025    ALKPHOS 55 06/20/2025    K 4.4 06/20/2025    PROT 7.1 06/20/2025     (L) 06/20/2025    CALCIUM 9.6 06/20/2025    AST 17 06/20/2025    ALT 19 06/20/2025    BUN 10 06/20/2025     Lab Results   Component Value Date    TRIG 158 (H) 07/11/2022    CHOL 297 (H) 07/11/2022    LDLCALC 194 (H) 07/11/2022    HDL 71 07/11/2022     Lab Results   Component Value Date    HGBA1C 5.2 02/02/2021     Lab Results   Component Value Date    WBC 6.5 06/20/2025    RBC 5.20 06/20/2025    HGB 13.8 06/20/2025     06/20/2025   No other concerns at this time.  Agreeable to continue monthly outreaches.  Encouraged to call if questions or concerns arise.    Deborah Leija

## 2025-07-18 ENCOUNTER — APPOINTMENT (OUTPATIENT)
Dept: INFUSION THERAPY | Facility: CLINIC | Age: 45
End: 2025-07-18
Payer: COMMERCIAL

## 2025-07-18 VITALS
RESPIRATION RATE: 18 BRPM | HEART RATE: 66 BPM | DIASTOLIC BLOOD PRESSURE: 77 MMHG | WEIGHT: 169.6 LBS | BODY MASS INDEX: 29.11 KG/M2 | TEMPERATURE: 98.8 F | SYSTOLIC BLOOD PRESSURE: 117 MMHG

## 2025-07-18 DIAGNOSIS — M06.09 POLYARTHRITIS WITH NEGATIVE RHEUMATOID FACTOR (MULTI): ICD-10-CM

## 2025-07-18 LAB
25(OH)D3 SERPL-MCNC: 58 NG/ML (ref 30–100)
ALBUMIN SERPL BCP-MCNC: 4.4 G/DL (ref 3.4–5)
ALP SERPL-CCNC: 49 U/L (ref 33–110)
ALT SERPL W P-5'-P-CCNC: 11 U/L (ref 7–45)
ANION GAP SERPL CALCULATED.3IONS-SCNC: 8 MMOL/L (ref 10–20)
AST SERPL W P-5'-P-CCNC: 15 U/L (ref 9–39)
BILIRUB SERPL-MCNC: 0.5 MG/DL (ref 0–1.2)
BUN SERPL-MCNC: 8 MG/DL (ref 6–23)
CALCIUM SERPL-MCNC: 9.5 MG/DL (ref 8.6–10.3)
CHLORIDE SERPL-SCNC: 107 MMOL/L (ref 98–107)
CO2 SERPL-SCNC: 27 MMOL/L (ref 21–32)
CREAT SERPL-MCNC: 0.79 MG/DL (ref 0.5–1.05)
EGFRCR SERPLBLD CKD-EPI 2021: >90 ML/MIN/1.73M*2
ERYTHROCYTE [DISTWIDTH] IN BLOOD BY AUTOMATED COUNT: 12.2 % (ref 11.5–14.5)
GLUCOSE SERPL-MCNC: 75 MG/DL (ref 74–99)
HCT VFR BLD AUTO: 41.5 % (ref 36–46)
HGB BLD-MCNC: 12.9 G/DL (ref 12–16)
MCH RBC QN AUTO: 26.3 PG (ref 26–34)
MCHC RBC AUTO-ENTMCNC: 31.1 G/DL (ref 32–36)
MCV RBC AUTO: 85 FL (ref 80–100)
NRBC BLD-RTO: 0 /100 WBCS (ref 0–0)
PLATELET # BLD AUTO: 356 X10*3/UL (ref 150–450)
POTASSIUM SERPL-SCNC: 3.9 MMOL/L (ref 3.5–5.3)
PROT SERPL-MCNC: 7.1 G/DL (ref 6.4–8.2)
RBC # BLD AUTO: 4.9 X10*6/UL (ref 4–5.2)
SODIUM SERPL-SCNC: 138 MMOL/L (ref 136–145)
TSH SERPL-ACNC: 1.09 MIU/L (ref 0.44–3.98)
WBC # BLD AUTO: 6.3 X10*3/UL (ref 4.4–11.3)

## 2025-07-18 PROCEDURE — 96375 TX/PRO/DX INJ NEW DRUG ADDON: CPT | Performed by: NURSE PRACTITIONER

## 2025-07-18 PROCEDURE — 96365 THER/PROPH/DIAG IV INF INIT: CPT | Performed by: NURSE PRACTITIONER

## 2025-07-18 PROCEDURE — 80053 COMPREHEN METABOLIC PANEL: CPT

## 2025-07-18 PROCEDURE — 82306 VITAMIN D 25 HYDROXY: CPT

## 2025-07-18 PROCEDURE — 85027 COMPLETE CBC AUTOMATED: CPT

## 2025-07-18 PROCEDURE — 84443 ASSAY THYROID STIM HORMONE: CPT

## 2025-07-18 RX ORDER — DIPHENHYDRAMINE HYDROCHLORIDE 50 MG/ML
50 INJECTION, SOLUTION INTRAMUSCULAR; INTRAVENOUS AS NEEDED
OUTPATIENT
Start: 2025-08-15

## 2025-07-18 RX ORDER — ALBUTEROL SULFATE 0.83 MG/ML
3 SOLUTION RESPIRATORY (INHALATION) AS NEEDED
OUTPATIENT
Start: 2025-08-15

## 2025-07-18 RX ORDER — KETOROLAC TROMETHAMINE 30 MG/ML
30 INJECTION, SOLUTION INTRAMUSCULAR; INTRAVENOUS ONCE
OUTPATIENT
Start: 2025-08-15 | End: 2025-08-15

## 2025-07-18 RX ORDER — EPINEPHRINE 0.3 MG/.3ML
0.3 INJECTION SUBCUTANEOUS EVERY 5 MIN PRN
OUTPATIENT
Start: 2025-08-15

## 2025-07-18 RX ORDER — KETOROLAC TROMETHAMINE 30 MG/ML
30 INJECTION, SOLUTION INTRAMUSCULAR; INTRAVENOUS ONCE
Status: COMPLETED | OUTPATIENT
Start: 2025-07-18 | End: 2025-07-18

## 2025-07-18 RX ORDER — FAMOTIDINE 10 MG/ML
20 INJECTION, SOLUTION INTRAVENOUS ONCE AS NEEDED
OUTPATIENT
Start: 2025-08-15

## 2025-07-18 RX ADMIN — KETOROLAC TROMETHAMINE 30 MG: 30 INJECTION, SOLUTION INTRAMUSCULAR; INTRAVENOUS at 13:39

## 2025-07-18 ASSESSMENT — ENCOUNTER SYMPTOMS
UNEXPECTED WEIGHT CHANGE: 0
CONSTIPATION: 0
FEVER: 0
WHEEZING: 0
SHORTNESS OF BREATH: 0
HEMATURIA: 0
FREQUENCY: 0
PALPITATIONS: 0
FATIGUE: 1
NAUSEA: 0
LIGHT-HEADEDNESS: 0
EYE PROBLEMS: 0
APPETITE CHANGE: 0
CHILLS: 0
MYALGIAS: 0
BLOOD IN STOOL: 0
EXTREMITY WEAKNESS: 0
TROUBLE SWALLOWING: 0
NUMBNESS: 0
COUGH: 0
DIARRHEA: 0
ABDOMINAL PAIN: 0
WOUND: 0
DYSURIA: 0
VOICE CHANGE: 0
SORE THROAT: 0
HEADACHES: 0
VOMITING: 0
BRUISES/BLEEDS EASILY: 0
LEG SWELLING: 0
ARTHRALGIAS: 0
DIZZINESS: 0

## 2025-07-18 ASSESSMENT — PAIN SCALES - GENERAL: PAINLEVEL_OUTOF10: 6

## 2025-07-18 NOTE — PROGRESS NOTES
[facility]   Infusion Clinic Note   Date: 2025   Name: Susan Ford  : 1980   MRN: [id number]         Reason for Visit: OP Infusion (Simponi 150 mg every 28 days)         Today: We administered methylPREDNISolone sod succinate, ketorolac, and golimumab (Simponi Aria) 150 mg in sodium chloride 0.9% 100 mL IV.       Ordered By: Enedina Clements,*       For a Diagnosis of: Polyarthritis with negative rheumatoid factor (Multi)       At today's visit patient accompanied by:       Today's Vitals:   Vitals:    25 1314 25 1443   BP: 133/72 117/77   Pulse: 63 66   Resp: 18 18   Temp: 36.7 °C (98.1 °F) 37.1 °C (98.8 °F)   SpO2: Comment: nails long    Weight: 76.9 kg (169 lb 9.6 oz)    PainSc:   6    PainLoc: Generalized    LMP: 2025             Pre - Treatment Checklist:      - Previous reaction to current treatment: no      (Assess patient for the concerns below. Document provider notification as appropriate).  - Active or recent infection with/without current antibiotic use: no  - Recent or planned invasive dental work: no  - Recent or planned surgeries: no  - Recently received or plans to receive vaccinations: no  - Has treatment related toxicities: no  - Any chance may be pregnant:  no      Pain: 6   - Is the pain different from normal: no   - Is prescribing Doctor aware:  n/a      Labs: Reviewed       Fall Risk Screening: Bar Fall Risk  History of Falling, Immediate or Within 3 Months: Yes  Secondary Diagnosis: Yes  Ambulatory Aid: Crutches/cane/walker  Intravenous Therapy/Heparin Lock: Yes  Gait/Transferring: Normal/bedrest/immobile  Mental Status: Oriented to own ability  Bar Fall Risk Score: 75            Review Of Systems:  Review of Systems   Constitutional:  Positive for fatigue. Negative for appetite change, chills, fever and unexpected weight change.   HENT:   Negative for hearing loss, mouth sores, sore throat, tinnitus, trouble  swallowing and voice change.    Eyes:  Negative for eye problems.   Respiratory:  Negative for cough, shortness of breath and wheezing.    Cardiovascular:  Negative for chest pain, leg swelling and palpitations.   Gastrointestinal:  Negative for abdominal pain, blood in stool, constipation, diarrhea, nausea and vomiting.   Genitourinary:  Negative for dysuria, frequency and hematuria.    Musculoskeletal:  Negative for arthralgias and myalgias.   Skin:  Negative for itching, rash and wound.   Neurological:  Negative for dizziness, extremity weakness, headaches, light-headedness and numbness.        Numbness in fingers in left hand (chronic)   Hematological:  Does not bruise/bleed easily.         Infusion Readiness:  - Assessment Concerns Related to Infusion: No  - Provider notified: n/a      New Patient Education:    N/A (returning patient for continuation of therapy. Ongoing education provided as needed.)        Treatment Conditions & Drug Specific Questions:    Golimumab  (SIMPONI)    (Unless otherwise specified on patient specific therapy plan):     TREATMENT CONDITIONS:  Unless otherwise specified on patient specific thearpy plan HOLD and notify provider prior to proceeding with today's infusion if patient with:  o Positive T-Spot  o Positive Hepatitis B Surface Ag / Hepatitis C     Lab Results   Component Value Date    TBGRES Negative  Reference range: NEGATIVE   10/14/2020    TSPOTR  09/12/2023     Negative  Reference range: Normal Value: Negative    A negative test result does not exclude the possibility of exposure  to   or infection with Mycobacterium tuberculosis (M. tuberculosis).    Patients with recent exposure to TB infected individuals exhibiting a   negative T-SPOT.TB result should be considered for retesting within 6   weeks or if other relevant clinical symptoms indicate.  Results from   T-SPOT.TB testing must be used in conjunction with each individual's   epidemiological history, current medical  "status, and results of other   diagnostic evaluations.  The T-SPOT.TB test is qualitative and  results   are reported as positive, borderline or negative, given that the test   controls perform as expected. In line with the Centers for Disease   Control and Prevention's 2010 recommendation to report quantitative   measurements alongside the qualitative result, the laboratory  provides   spot counts for informational purposes only.  The T-SPOT.TB test  should   not be interpreted as a quantitative test.  Test performed at:  [facility] [address] [place] [id number]        Lab Results   Component Value Date    HEPBSAG NEGATIVE 10/14/2020      No results found for: \"NONUHFIRE\", \"NONUHSWGH\", \"NONUHFISH\"  Lab Results   Component Value Date    HEPCAB  10/14/2020     Negative  Reference range: NEGATIVE  Performed at the [facility] unless   otherwise noted.        Lab Results   Component Value Date    HEPCAB  10/14/2020     Negative  Reference range: NEGATIVE  Performed at the [facility] unless   otherwise noted.       No results found for: \"HBCTI\", \"HEPBCAB\"    Lab Results   Component Value Date    WBC 6.3 07/18/2025    HGB 12.9 07/18/2025    HCT 41.5 07/18/2025    MCV 85 07/18/2025     07/18/2025        Patient meets treatment conditions? Yes    DRUG SPECIFIC QUESTIONS:   - Up to date on all immunizations per patient report? Yes    Available Immunization Records:  Immunization History   Administered Date(s) Administered    Flu vaccine (IIV4), preservative free *Check age/dose* 09/17/2020    Influenza, injectable, quadrivalent 10/14/2020    PPD Test 02/24/2012, 03/02/2012, 09/20/2019    Pneumococcal conjugate vaccine, 20-valent (PREVNAR 20) 06/21/2023    Td (adult) 02/26/2011    Tdap vaccine, age 7 year and older (BOOSTRIX, ADACEL) 09/17/2020, 11/10/2020         - Any history of or new or worsening s/s of heart failure which may include dyspnea, edema? " No  (If worsening s/s notify provider prior to proceeding. Simponi may cause exacerbation of heart failure)     - Any new diagnosis of cancer, especially lymphomas? No    (Box Warning: Malignancy. If YES notify prescribing provider prior to proceeding )      REMINDER:  WEIGHT BASED DRUG    Recommended Vitals/Observation:  Vitals: Take vital signs prior to starting infusion, at infusion conclusion and as needed.   Observation: No observation.        Weight Based Drug Calculations:    WEIGHT BASED DRUGS: Golimumab (SIMPONI)   Patient's dosing weight (kg): 74.8     10% weight variance for prescribed treatment: 67.32 kg to 82.28 kg     Patient's weight today:   Vitals:    07/18/25 1314   Weight: 76.9 kg (169 lb 9.6 oz)         weight range for prescribed dose:     Patient weight today falls outside of 10% variance or  weight range: No     Home Care pharmacist informed of weight variance: Not applicable    Doses that are weight based have an acceptable variance rule within 10% of the prescribed   order and/or within  weight range. If patient weight on day of infusion falls   outside of the 10% variance, or weight range, infusion is administered and   pharmacy contacted regarding future dosing adjustments, per policy.      Post Treatment: Patient tolerated treatment without issue and was discharged in no apparent distress.      Note Authored / Patient Cared for By: Lamonte Camacho RN     2005 Line flushed with NS 50 ml when infusion bag completed.  _________________________________________________________________________    Note authored and patient cared for by: Lamonte Camacho RN   Note/Encounter reviewed by: Brook CHENG NP. This provider on site at time of patient infusion. Infusion staff to notify this provider of any questions, concerns, abnormals or issues during infusion.    Final check of medication completed by this PROSPER / or on-site pharmacist with administering nurse  using positive identification prior to administration. Final appearance of product checked for accuracy and conformity to the formula of the prepared product. Assured use of correct ingredients, accurate calculations and precise measurements under appropriate conditions and procedures.    No issues reported during today's encounter. Pt. tolerated infusion without difficulty. Pt. not independently evaluated by this provider during today's encounter.  (Brook CHENG NP)

## 2025-07-18 NOTE — PATIENT INSTRUCTIONS
Today :We administered methylPREDNISolone sod succinate, ketorolac, and golimumab (Simponi Aria) 150 mg in sodium chloride 0.9% 100 mL IV.     For:   1. 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     (Tell all doctors including dentists that you are taking this medication)     Go to the emergency room or call 911 if:  -You have signs of allergic reaction:   -Rash, hives, itching.   -Swollen, blistered, peeling skin.   -Swelling of face, lips, mouth, tongue or throat.   -Tightness of chest, trouble breathing, swallowing or talking     Call your doctor:  - If IV / injection site gets red, warm, swollen, itchy or leaks fluid or pus.     (Leave dressing on your IV site for at least 2 hours and keep area clean and dry  - If you get sick or have symptoms of infection or are not feeling well for any reason.    (Wash your hands often, stay away from people who are sick)  - If you have side effects from your medication that do not go away or are bothersome.     (Refer to the teaching your nurse gave you for side effects to call your doctor about)    - Common side effects may include:  stuffy nose, headache, feeling tired, muscle aches, upset stomach  - Before receiving any vaccines     - Call the [facility] at   If:  - You get sick, are on antibiotics, have had a recent vaccine, have surgery or dental work and your doctor wants your visit rescheduled.  - You need to cancel and reschedule your visit for any reason. Call at least 2 days before your visit if you need to cancel.   - Your insurance changes before your next visit.    (We will need to get approval from your new insurance. This can take up to two weeks.)     The [facility] is opened Monday thru Friday. We are closed on weekends and holidays.   Voice mail will take your call if  the center is closed. If you leave a message please allow 24 hours for a call back during weekdays. If you leave a message on a weekend/holiday, we will call you back the next business day.    A pharmacist is available Monday - Friday from 8:30AM to 3:30PM to help answer any questions you may have about your prescriptions(s). Please call pharmacy at:    [facility]: [phone]  [facility]: [phone]  [facility]: [phone]

## 2025-07-19 LAB — NONINV COLON CA DNA+OCC BLD SCRN STL QL: NEGATIVE

## 2025-07-31 ENCOUNTER — OFFICE VISIT (OUTPATIENT)
Facility: CLINIC | Age: 45
End: 2025-07-31
Payer: MEDICAID

## 2025-07-31 VITALS
DIASTOLIC BLOOD PRESSURE: 60 MMHG | WEIGHT: 172 LBS | SYSTOLIC BLOOD PRESSURE: 113 MMHG | HEART RATE: 68 BPM | BODY MASS INDEX: 29.37 KG/M2 | HEIGHT: 64 IN

## 2025-07-31 DIAGNOSIS — E55.9 VITAMIN D DEFICIENCY: ICD-10-CM

## 2025-07-31 DIAGNOSIS — M32.10 SYSTEMIC LUPUS ERYTHEMATOSUS WITH ORGAN SYSTEM INVOLVEMENT (MULTI): Primary | ICD-10-CM

## 2025-07-31 DIAGNOSIS — M06.09 POLYARTHRITIS WITH NEGATIVE RHEUMATOID FACTOR (MULTI): ICD-10-CM

## 2025-07-31 DIAGNOSIS — B37.9 YEAST INFECTION: Primary | ICD-10-CM

## 2025-07-31 DIAGNOSIS — Z79.899 ENCOUNTER FOR LONG-TERM (CURRENT) USE OF MEDICATIONS: ICD-10-CM

## 2025-07-31 DIAGNOSIS — R42 VERTIGO: ICD-10-CM

## 2025-07-31 DIAGNOSIS — Z78.9 POOR TOLERANCE FOR AMBULATION: ICD-10-CM

## 2025-07-31 DIAGNOSIS — G89.29 OTHER CHRONIC PAIN: ICD-10-CM

## 2025-07-31 PROCEDURE — 1036F TOBACCO NON-USER: CPT | Performed by: INTERNAL MEDICINE

## 2025-07-31 PROCEDURE — 99214 OFFICE O/P EST MOD 30 MIN: CPT | Performed by: INTERNAL MEDICINE

## 2025-07-31 PROCEDURE — 3008F BODY MASS INDEX DOCD: CPT | Performed by: INTERNAL MEDICINE

## 2025-07-31 RX ORDER — FLUCONAZOLE 150 MG/1
TABLET ORAL
Qty: 12 TABLET | Refills: 1 | Status: SHIPPED | OUTPATIENT
Start: 2025-07-31

## 2025-07-31 ASSESSMENT — ROUTINE ASSESSMENT OF PATIENT INDEX DATA (RAPID3)
TOTAL RAPID3 SCORE: 23
PARTIPATE_RECREATIONAL_ACTIVITIES: UNABLE TO DO
WASH_DRY_BODY: WITH MUCH DIFFICULTY
WALK_FLAT_GROUND: UNABLE TO DO
ON A SCALE OF ONE TO TEN, HOW MUCH PAIN HAVE YOU HAD BECAUSE OF YOUR CONDITION OVER THE PAST WEEK?: 7
WEIGHTED_TOTAL_SCORE: 7.67
IN_OUT_BED: WITH MUCH DIFFICULTY
FN_SCORE: 7
SUM OF QUESTIONS A TO J: 21
DRESS_YOURSELF: WITH MUCH DIFFICULTY
ON A SCALE OF ONE TO TEN, CONSIDERING ALL THE WAYS IN WHICH ILLNESS AND HEALTH CONDITIONS MAY AFFECT YOU AT THIS TIME, PLEASE INDICATE BELOW HOW YOU ARE DOING:: 9
ON A SCALE OF ONE TO TEN, CONSIDERING ALL THE WAYS IN WHICH ILLNESS AND HEALTH CONDITIONS MAY AFFECT YOU AT THIS TIME, PLEASE INDICATE BELOW HOW YOU ARE DOING:: 9
ON A SCALE OF ONE TO TEN, HOW MUCH PAIN HAVE YOU HAD BECAUSE OF YOUR CONDITION OVER THE PAST WEEK?: 7
SEVERITY_SCORE: 0
SEVERITY_SCORE: HIGH SEVERITY (HS)
FEELINGS_ANXIETY_NERVOUS: WITH MUCH DIFFICULTY
LIFT_CUP_TO_MOUTH: WITH SOME DIFFICULTY
TURN_FAUCETS_OFF: WITH SOME DIFFICULTY
WALK_KILOMETERS: UNABLE TO DO
IN_OUT_TRANSPORT: WITH MUCH DIFFICULTY
FEELINGS_DEPRESSION: WITH MUCH DIFFICULTY
PICK_CLOTHES_OFF_FLOOR: WITH MUCH DIFFICULTY
GOOD_NIGHTS_SLEEP: UNABLE TO DO

## 2025-07-31 ASSESSMENT — COLUMBIA-SUICIDE SEVERITY RATING SCALE - C-SSRS
2. HAVE YOU ACTUALLY HAD ANY THOUGHTS OF KILLING YOURSELF?: NO
1. IN THE PAST MONTH, HAVE YOU WISHED YOU WERE DEAD OR WISHED YOU COULD GO TO SLEEP AND NOT WAKE UP?: NO
6. HAVE YOU EVER DONE ANYTHING, STARTED TO DO ANYTHING, OR PREPARED TO DO ANYTHING TO END YOUR LIFE?: NO

## 2025-07-31 ASSESSMENT — PATIENT HEALTH QUESTIONNAIRE - PHQ9
2. FEELING DOWN, DEPRESSED OR HOPELESS: NOT AT ALL
SUM OF ALL RESPONSES TO PHQ9 QUESTIONS 1 AND 2: 0
1. LITTLE INTEREST OR PLEASURE IN DOING THINGS: NOT AT ALL

## 2025-07-31 ASSESSMENT — PAIN SCALES - GENERAL: PAINLEVEL_OUTOF10: 8

## 2025-07-31 NOTE — PROGRESS NOTES
[facility]/  Rheumatology  [address], [address]  [place] [address]  Phone: [phone]  Fax: [phone]    Rheumatology Progress Note 07/31/2025      Susan Ford is a 45 y.o. female here for follow up.    Last Visit: 3/26/2025      Rheum Hx      Previous Tx    Health Maintenance  DXA- none  Malignancy Hx- none  Immunization History   Administered Date(s) Administered    Flu vaccine (IIV4), preservative free *Check age/dose* 09/17/2020    Influenza, injectable, quadrivalent 10/14/2020    PPD Test 02/24/2012, 03/02/2012, 09/20/2019    Pneumococcal conjugate vaccine, 20-valent (PREVNAR 20) 06/21/2023    Td (adult) 02/26/2011    Tdap vaccine, age 7 year and older (BOOSTRIX, ADACEL) 09/17/2020, 11/10/2020          Past Medical History:   Diagnosis Date    Asthma     Cervicalgia     Chronic pain syndrome     Frequent falls     GERD (gastroesophageal reflux disease)     Knee pain, right     Snoring     Snoring    UTI (urinary tract infection)       Past Surgical History:   Procedure Laterality Date    APPENDECTOMY  11/21/2013    Appendectomy    HERNIA REPAIR  11/21/2013    Hernia Repair    PATELLAR TENDON REPAIR Left     TUBAL LIGATION  11/21/2013    Tubal Ligation    WISDOM TOOTH EXTRACTION        Current Outpatient Medications   Medication Sig Dispense Refill    albuterol 2.5 mg /3 mL (0.083 %) nebulizer solution Take 3 mL (2.5 mg) by nebulization every 6 hours if needed for wheezing.      benzonatate (Tessalon) 100 mg capsule Take 1 capsule (100 mg) by mouth 3 times a day as needed for cough. Do not crush or chew. 20 capsule 0    cyclobenzaprine (Flexeril) 10 mg tablet Take 1 tablet (10 mg) by mouth if needed for muscle spasms. 90 tablet 3    dupilumab (Dupixent Pen) 300 mg/2 mL injection Inject 1 Pen (300 mg) under the skin every 14 (fourteen) days.      EPINEPHrine (EpiPen 2-Milan) 0.3 mg/0.3 mL injection syringe Inject 0.3 mL (0.3 mg) into the muscle 1 time if needed  for anaphylaxis for up to 1 dose. Inject into upper leg. Call 911 after use. 2 each 1    fexofenadine (Allegra) 180 mg tablet Take 1 tablet (180 mg) by mouth once daily. 90 tablet 1    fluticasone-umeclidin-vilanter (Trelegy Ellipta) 200-62.5-25 mcg blister with device Inhale 1 puff once daily.      meclizine (Antivert) 25 mg tablet Take 1 tablet (25 mg) by mouth 3 times a day as needed for dizziness. 90 tablet 3    mometasone-formoterol (Dulera) 200-5 mcg/actuation inhaler Inhale 2 puffs twice a day.      naloxone (Narcan) 4 mg/0.1 mL nasal spray Administer 1 spray (4 mg) into affected nostril(s) if needed for opioid reversal. May repeat in 2-3 minutes if needed, alternating nostrils 2 each 0    nebulizer accessories misc Use as directed with nebulizer machine. 1 each 0    oxyCODONE-acetaminophen (Percocet)  mg tablet Take 1 tablet by mouth every 4 hours if needed for severe pain (7 - 10). 120 tablet 0    oxyCODONE-acetaminophen (Percocet)  mg tablet Take 1 tablet by mouth every 6 hours if needed for severe pain (7 - 10). 120 tablet 0    predniSONE (Deltasone) 10 mg tablet TAKE ONE TABLET BY MOUTH ONCE DAILY 30 tablet 1    sertraline (Zoloft) 50 mg tablet Take 1 tablet (50 mg) by mouth once daily. 30 tablet 1    sulfaSALAzine (Azulfidine) 500 mg DR tablet Take 1 tablet (500 mg) by mouth 2 times a day. Do not crush, chew, or split. 180 tablet 3    traMADol (Ultram) 50 mg tablet Take 1 tablet (50 mg) by mouth every 6 hours if needed for severe pain (7 - 10). 120 tablet 3    triamcinolone (Kenalog) 0.1 % ointment Apply topically 2 times a day. 80 g 5    albuterol (Ventolin HFA) 90 mcg/actuation inhaler Inhale 1 puff every 4 hours if needed for wheezing (inhale 1-2 puffs every 4 hours as needed.). 18 g 2     No current facility-administered medications for this visit.      Allergies   Allergen Reactions    Penicillin Shortness of breath    Penicillins Shortness of breath, Anaphylaxis and Swelling      "Reaction was when patient was a child. Pts mother told her about it.    Bee Pollen Unknown    House Dust Unknown        Vitals:    07/31/25 1026   BP: 113/60   BP Location: Left arm   Patient Position: Sitting   BP Cuff Size: Adult long   Pulse: 68   Weight: 78 kg (172 lb)   Height: 1.626 m (5' 4\")             Rapid 3  Function Score (FN): 7  Pain Score (PN) (0-10): 7  Patient Global (PTGL) (0-10): 9  Rapid3 Score: 23  RAPID3 Weighted Score: 7.67     Workup    Component      Latest Ref Rng 3/25/2025   WHITE BLOOD CELL COUNT      3.8 - 10.8 Thousand/uL 5.8    RED BLOOD CELL COUNT      3.80 - 5.10 Million/uL 4.77    HEMOGLOBIN      11.7 - 15.5 g/dL 13.0    HEMATOCRIT      35.0 - 45.0 % 40.3    MCV      80.0 - 100.0 fL 84.5    MCH      27.0 - 33.0 pg 27.3    MCHC      32.0 - 36.0 g/dL 32.3    RDW      11.0 - 15.0 % 12.6    PLATELET COUNT      140 - 400 Thousand/uL 309    MPV      7.5 - 12.5 fL 10.7    ABSOLUTE NEUTROPHILS      1,500 - 7,800 cells/uL 1,351 (L)    ABSOLUTE LYMPHOCYTES      850 - 3,900 cells/uL 3,782    ABSOLUTE MONOCYTES      200 - 950 cells/uL 360    ABSOLUTE EOSINOPHILS      15 - 500 cells/uL 249    ABSOLUTE BASOPHILS      0 - 200 cells/uL 58    NEUTROPHILS      % 23.3    LYMPHOCYTES      % 65.2    MONOCYTES      % 6.2    EOSINOPHILS      % 4.3    BASOPHILS      % 1.0    GLUCOSE      65 - 139 mg/dL 96    UREA NITROGEN (BUN)      7 - 25 mg/dL 9    CREATININE      0.50 - 0.99 mg/dL 0.67    EGFR      > OR = 60 mL/min/1.73m2 110    SODIUM      135 - 146 mmol/L 140    POTASSIUM      3.5 - 5.3 mmol/L 4.1    CHLORIDE      98 - 110 mmol/L 107    CARBON DIOXIDE      20 - 32 mmol/L 26    ELECTROLYTE BALANCE      7 - 17 mmol/L (calc) 7    CALCIUM      8.6 - 10.2 mg/dL 9.4    PROTEIN, TOTAL      6.1 - 8.1 g/dL 6.8    ALBUMIN      3.6 - 5.1 g/dL 4.1    BILIRUBIN, TOTAL      0.2 - 1.2 mg/dL 0.4    ALKALINE PHOSPHATASE      31 - 125 U/L 49    AST      10 - 30 U/L 14    ALT      6 - 29 U/L 13    VITAMIN D, 1,25 (OH)2, " TOTAL      18 - 72 pg/mL 71    VITAMIN D3, 1,25 (OH)2      pg/mL 22    VITAMIN D2, 1,25 (OH)2      pg/mL 49    SED RATE BY MODIFIED WESTERGREN      < OR = 20 mm/h 6    C-REACTIVE PROTEIN      <8.0 mg/L <3.0    INTERLEUKIN 6 (IL 6), SERUM      <5.00 pg/mL <1.40    CREATINE KINASE, TOTAL      20 - 239 U/L 115    DNA (DS) ANTIBODY      IU/mL <1    COMPLEMENT COMPONENT C4C      15 - 57 mg/dL 28    COMPLEMENT COMPONENT C3C      83 - 193 mg/dL 131    VITAMIN D,25-OH,TOTAL,IA      30 - 100 ng/mL 21 (L)            Assessment/Plan  No diagnosis found.           No orders of the defined types were placed in this encounter.       Previously, adherent and tolerating since April Simponi q 8 wks; Gets about 3 good weeks   Shuffling,  hurting all over off, cramping  Denies any recent or current infection.  Not on any NSAIDs or glucocorticoids.  1.5 pain  pills last about 3 hours  ROS+ for drenching nightsweats without fever, fatigue, ONOFRE, joint pain/swelling/stiffness, back pain, raynaud's without pitting/ulceration, weakness hands legs, depression/anxiety.  Rapid 3 consistent with high severity.  Labs reviewed  D/w pt tx options and decided on   Increase Simponi frequency to every 4 wks to aim at remission.  Advised of possible side effects and importance of monitoring.   All questions answered.  Patient to follow up with primary care provider regarding all other medical issues not addressed today and for medical chart updating.         Since last appt, adherent and tolerating Simponi q 4 wks, SSZ.  Trying to limit glucocorticoids.  Denies any recent or current infection.  Not on any NSAIDs.  ROS+ for unintentional wt loss, fatigue, hair loss, scalp tenderness, blurry vision, swollen galnds/ndoes, atypical CP, ONOFRE, rashes, joint pain/swelling/stiffness, back pain, raynaud's without pitting/ulceration, weakness arms/hands, numbness/tingling, depression/anxiety.  Rapid 3 consistent with high severity.  Labs reviewed  D/w pt tx  options  Continue regimen  Vit D  Advised of possible side effects and importance of monitoring.   All questions answered.  Patient to follow up with primary care provider regarding all other medical issues not addressed today and for medical chart updating.         Since last appt, adherent and tolerating***  4 good days [id number] after  4 good days and then almost 2 wsks start feeling     Denies any recent or current infection.  Not on any NSAIDs or glucocorticoids.  ROS+ for ***  Rapid 3 consistent with ***  Labs reviewed  D/w pt tx options and decided on ***  Advised of possible side effects and importance of monitoring.   All questions answered.  Patient to follow up with primary care provider regarding all other medical issues not addressed today and for medical chart updating.  \    Enedina Hernandez MD      Patient Care Team:  Dora Zamora MD as PCP - General (Family Medicine)  Dora Zamora MD as PCP - [facility] Medicaid PCP  Dora Zamora MD as PCP - [place] Medicaid PCP  Deborah Leija as Care Manager (Case Management)  Enedina Hernandez MD as Consulting Physician (Rheumatology)       20 mm/h  9    Vitamin D, 25-Hydroxy, Total      30 - 100 ng/mL  48        Assessment/Plan  1. Systemic lupus erythematosus with organ system involvement (Multi)    2. Yeast infection    3. Vertigo    4. Polyarthritis with negative rheumatoid factor (Multi)    5. Other chronic pain    6. Vitamin D deficiency    7. Encounter for long-term (current) use of medications    8. Poor tolerance for ambulation               Orders Placed This Encounter   Procedures    Albumin-Creatinine Ratio, Urine Random    Anti-DNA Antibody, Double-Stranded    C1Q Complement    C3 Complement    C4 Complement    CBC and Auto Differential    Comprehensive Metabolic Panel    C-Reactive Protein    Creatine Kinase    Sedimentation Rate    Urinalysis with Reflex Culture and Microscopic    Vitamin D 25-Hydroxy,Total (for eval of Vitamin D levels)    Interleukin-6    IgG Subclasses (1, 2, 3, and 4)    Ferritin    C-Telopeptide, Beta Cross Linked    Parathyroid Hormone, Intact    Phosphorus    Magnesium    Iron and TIBC    Referral to Neurology        Previously, adherent and tolerating since April Simponi q 8 wks; Gets about 3 good weeks   Shuffling,  hurting all over off, cramping  Denies any recent or current infection.  Not on any NSAIDs or glucocorticoids.  1.5 pain  pills last about 3 hours  ROS+ for drenching nightsweats without fever, fatigue, ONOFRE, joint pain/swelling/stiffness, back pain, raynaud's without pitting/ulceration, weakness hands legs, depression/anxiety.  Rapid 3 consistent with high severity.  Labs reviewed  D/w pt tx options and decided on   Increase Simponi frequency to every 4 wks to aim at remission.  Advised of possible side effects and importance of monitoring.   All questions answered.  Patient to follow up with primary care provider regarding all other medical issues not addressed today and for medical chart updating.     Previously, adherent and tolerating Simponi q 4 wks, SSZ.  Trying to limit  glucocorticoids.  Denies any recent or current infection.  Not on any NSAIDs.  ROS+ for unintentional wt loss, fatigue, hair loss, scalp tenderness, blurry vision, swollen galnds/ndoes, atypical CP, ONOFRE, rashes, joint pain/swelling/stiffness, back pain, raynaud's without pitting/ulceration, weakness arms/hands, numbness/tingling, depression/anxiety.  Rapid 3 consistent with high severity.  Labs reviewed  D/w pt tx options  Continue regimen  Vit D  Advised of possible side effects and importance of monitoring.   All questions answered.  Patient to follow up with primary care provider regarding all other medical issues not addressed today and for medical chart updating.         Since last appt, adherent and tolerating Simponi  4 good days fatigue after  4 good days and then almost 2 wsks start feeling   Denies any recent or current infection.  Not on any NSAIDs or glucocorticoids.  ROS+ for drenching night sweats without fever, fatigue, scalp tenderness, vision issues, dyspnea on exertion, cough, GERD, rashes, sun sensitivity, joint pain, swelling, stiffness, Raynaud's without pitting ulceration, weakness hands legs, numbness tingling, depression anxiety  Unable to drive  Rapid 3 consistent with high severity  Labs reviewed  D/w pt tx options  Recommend adjusting Simponi to aim for full remission.   Continue rest of regimen.  Advised of possible side effects and importance of monitoring.   All questions answered.  Patient to follow up with primary care provider regarding all other medical issues not addressed today and for medical chart updating.      Enedina Hernandez MD      Patient Care Team:  Dora Zamora MD as PCP - General (Family Medicine)  Dora Zamora MD as PCP - [facility] Medicaid PCP  Deborah Leija as Care Manager (Case Management)  Enedina Hernandez MD as Consulting Physician (Rheumatology)

## 2025-08-05 ENCOUNTER — TELEPHONE (OUTPATIENT)
Dept: PRIMARY CARE | Facility: CLINIC | Age: 45
End: 2025-08-05
Payer: MEDICAID

## 2025-08-05 NOTE — TELEPHONE ENCOUNTER
Pt was called for appoinment for disabity paperwork. There is no soon appointments aviable. Do you want to double book somewere for a tle visit.paperwork scaned into chart and original in my folder

## 2025-08-05 NOTE — TELEPHONE ENCOUNTER
Please offer her first available and wait list.  Needs dedicated appointment for paperwork completion and review.

## 2025-08-12 ENCOUNTER — TRANSCRIBE ORDERS (OUTPATIENT)
Facility: CLINIC | Age: 45
End: 2025-08-12
Payer: MEDICAID

## 2025-08-12 DIAGNOSIS — Z79.899 ENCOUNTER FOR LONG-TERM (CURRENT) USE OF MEDICATIONS: Primary | ICD-10-CM

## 2025-08-12 DIAGNOSIS — G89.29 OTHER CHRONIC PAIN: ICD-10-CM

## 2025-08-12 DIAGNOSIS — M06.09 POLYARTHRITIS WITH NEGATIVE RHEUMATOID FACTOR (MULTI): ICD-10-CM

## 2025-08-13 RX ORDER — OXYCODONE AND ACETAMINOPHEN 10; 325 MG/1; MG/1
1 TABLET ORAL EVERY 4 HOURS PRN
Qty: 120 TABLET | Refills: 0 | Status: SHIPPED | OUTPATIENT
Start: 2025-08-15

## 2025-08-14 ENCOUNTER — PATIENT OUTREACH (OUTPATIENT)
Dept: PRIMARY CARE | Facility: CLINIC | Age: 45
End: 2025-08-14
Payer: MEDICAID

## 2025-08-14 DIAGNOSIS — M06.09 RHEUMATOID ARTHRITIS OF MULTIPLE SITES WITHOUT RHEUMATOID FACTOR (MULTI): ICD-10-CM

## 2025-08-14 DIAGNOSIS — J45.909 ASTHMA, UNSPECIFIED ASTHMA SEVERITY, UNSPECIFIED WHETHER COMPLICATED, UNSPECIFIED WHETHER PERSISTENT (HHS-HCC): ICD-10-CM

## 2025-08-15 ENCOUNTER — APPOINTMENT (OUTPATIENT)
Dept: INFUSION THERAPY | Facility: CLINIC | Age: 45
End: 2025-08-15
Payer: COMMERCIAL

## 2025-08-15 VITALS
TEMPERATURE: 97.3 F | BODY MASS INDEX: 28.58 KG/M2 | DIASTOLIC BLOOD PRESSURE: 74 MMHG | HEART RATE: 65 BPM | WEIGHT: 166.5 LBS | SYSTOLIC BLOOD PRESSURE: 116 MMHG | RESPIRATION RATE: 18 BRPM

## 2025-08-15 DIAGNOSIS — M06.09 POLYARTHRITIS WITH NEGATIVE RHEUMATOID FACTOR (MULTI): ICD-10-CM

## 2025-08-15 RX ORDER — EPINEPHRINE 0.3 MG/.3ML
0.3 INJECTION SUBCUTANEOUS EVERY 5 MIN PRN
OUTPATIENT
Start: 2025-09-12

## 2025-08-15 RX ORDER — ALBUTEROL SULFATE 0.83 MG/ML
3 SOLUTION RESPIRATORY (INHALATION) AS NEEDED
OUTPATIENT
Start: 2025-09-12

## 2025-08-15 RX ORDER — KETOROLAC TROMETHAMINE 30 MG/ML
30 INJECTION, SOLUTION INTRAMUSCULAR; INTRAVENOUS ONCE
Status: COMPLETED | OUTPATIENT
Start: 2025-08-15 | End: 2025-08-15

## 2025-08-15 RX ORDER — DIPHENHYDRAMINE HYDROCHLORIDE 50 MG/ML
50 INJECTION, SOLUTION INTRAMUSCULAR; INTRAVENOUS AS NEEDED
OUTPATIENT
Start: 2025-09-12

## 2025-08-15 RX ORDER — KETOROLAC TROMETHAMINE 30 MG/ML
30 INJECTION, SOLUTION INTRAMUSCULAR; INTRAVENOUS ONCE
OUTPATIENT
Start: 2025-09-12 | End: 2025-09-12

## 2025-08-15 RX ORDER — FAMOTIDINE 10 MG/ML
20 INJECTION, SOLUTION INTRAVENOUS ONCE AS NEEDED
OUTPATIENT
Start: 2025-09-12

## 2025-08-15 RX ADMIN — KETOROLAC TROMETHAMINE 30 MG: 30 INJECTION, SOLUTION INTRAMUSCULAR; INTRAVENOUS at 13:27

## 2025-08-15 ASSESSMENT — ENCOUNTER SYMPTOMS
HEMATURIA: 0
DIZZINESS: 1
NUMBNESS: 1
BACK PAIN: 1
ARTHRALGIAS: 1
APPETITE CHANGE: 0
DYSURIA: 0
WOUND: 0
VOMITING: 0
LIGHT-HEADEDNESS: 0
WHEEZING: 0
CHILLS: 0
BLOOD IN STOOL: 0
EXTREMITY WEAKNESS: 1
UNEXPECTED WEIGHT CHANGE: 1
SHORTNESS OF BREATH: 0
FREQUENCY: 0
BRUISES/BLEEDS EASILY: 1
DIARRHEA: 0
TROUBLE SWALLOWING: 0
MYALGIAS: 1
PALPITATIONS: 1
ABDOMINAL PAIN: 0
CONSTIPATION: 1
VOICE CHANGE: 0
SORE THROAT: 0
HEADACHES: 1
COUGH: 0
EYE PROBLEMS: 1
LEG SWELLING: 0
NAUSEA: 0
FEVER: 1
FATIGUE: 0

## 2025-08-15 ASSESSMENT — PAIN SCALES - GENERAL: PAINLEVEL_OUTOF10: 9

## 2025-08-18 ENCOUNTER — OFFICE VISIT (OUTPATIENT)
Dept: PRIMARY CARE | Facility: CLINIC | Age: 45
End: 2025-08-18
Payer: MEDICAID

## 2025-08-18 VITALS
SYSTOLIC BLOOD PRESSURE: 118 MMHG | WEIGHT: 171 LBS | DIASTOLIC BLOOD PRESSURE: 68 MMHG | BODY MASS INDEX: 29.19 KG/M2 | HEIGHT: 64 IN | TEMPERATURE: 97.3 F

## 2025-08-18 DIAGNOSIS — D86.0 SARCOIDOSIS OF LUNG (MULTI): ICD-10-CM

## 2025-08-18 DIAGNOSIS — F41.9 ANXIETY AND DEPRESSION: ICD-10-CM

## 2025-08-18 DIAGNOSIS — R23.2 HOT FLASHES: ICD-10-CM

## 2025-08-18 DIAGNOSIS — Z02.89 ENCOUNTER FOR COMPLETION OF FORM WITH PATIENT: Primary | ICD-10-CM

## 2025-08-18 DIAGNOSIS — M06.09 POLYARTHRITIS WITH NEGATIVE RHEUMATOID FACTOR (MULTI): ICD-10-CM

## 2025-08-18 DIAGNOSIS — K75.4 AUTOIMMUNE HEPATITIS (MULTI): ICD-10-CM

## 2025-08-18 DIAGNOSIS — N95.1 PERIMENOPAUSE: ICD-10-CM

## 2025-08-18 DIAGNOSIS — F32.A ANXIETY AND DEPRESSION: ICD-10-CM

## 2025-08-18 PROCEDURE — 99214 OFFICE O/P EST MOD 30 MIN: CPT | Performed by: STUDENT IN AN ORGANIZED HEALTH CARE EDUCATION/TRAINING PROGRAM

## 2025-08-18 PROCEDURE — 3008F BODY MASS INDEX DOCD: CPT | Performed by: STUDENT IN AN ORGANIZED HEALTH CARE EDUCATION/TRAINING PROGRAM

## 2025-08-18 RX ORDER — VENLAFAXINE HYDROCHLORIDE 75 MG/1
75 CAPSULE, EXTENDED RELEASE ORAL DAILY
Qty: 30 CAPSULE | Refills: 1 | Status: SHIPPED | OUTPATIENT
Start: 2025-08-18 | End: 2025-10-17

## 2025-08-18 RX ORDER — VENLAFAXINE HYDROCHLORIDE 37.5 MG/1
37.5 CAPSULE, EXTENDED RELEASE ORAL DAILY
Qty: 30 CAPSULE | Refills: 1 | Status: CANCELLED | OUTPATIENT
Start: 2025-08-18 | End: 2025-10-17

## 2025-08-18 ASSESSMENT — PAIN SCALES - GENERAL: PAINLEVEL_OUTOF10: 8

## 2025-09-01 ENCOUNTER — PATIENT MESSAGE (OUTPATIENT)
Facility: CLINIC | Age: 45
End: 2025-09-01
Payer: COMMERCIAL

## 2025-09-12 ENCOUNTER — APPOINTMENT (OUTPATIENT)
Dept: INFUSION THERAPY | Facility: CLINIC | Age: 45
End: 2025-09-12
Payer: COMMERCIAL

## 2025-10-10 ENCOUNTER — APPOINTMENT (OUTPATIENT)
Dept: INFUSION THERAPY | Facility: CLINIC | Age: 45
End: 2025-10-10
Payer: COMMERCIAL

## 2025-11-07 ENCOUNTER — APPOINTMENT (OUTPATIENT)
Dept: INFUSION THERAPY | Facility: CLINIC | Age: 45
End: 2025-11-07
Payer: COMMERCIAL

## 2025-12-05 ENCOUNTER — APPOINTMENT (OUTPATIENT)
Dept: INFUSION THERAPY | Facility: CLINIC | Age: 45
End: 2025-12-05
Payer: MEDICAID

## (undated) DEVICE — SUTURE, MONOCRYL, 3-0, 27 IN, PS-2, UNDYED

## (undated) DEVICE — Device

## (undated) DEVICE — SOLUTION, IRRIGATION, STERILE WATER, 1000 ML, POUR BOTTLE

## (undated) DEVICE — PROCEDURE PACK, TX2

## (undated) DEVICE — COVER, TABLE, 44X90

## (undated) DEVICE — DRAPE, SHEET, THREE QUARTER, FAN FOLD, 57 X 77 IN

## (undated) DEVICE — TOWEL PACK, STERILE, 4/PACK, BLUE

## (undated) DEVICE — COUNTER, NEEDLE, FOAM BLOCK, W/MAGNET, W/BLADE GUARD, 10 COUNT, RED, LF

## (undated) DEVICE — DRESSING, GAUZE, PETROLATUM, PATCH, XEROFORM, 1 X 8 IN, STERILE

## (undated) DEVICE — GLOVE, SURGICAL, PROTEXIS PI , 8.0, PF, LF

## (undated) DEVICE — TIP, SUCTION, YANKAUER, FLEXIBLE

## (undated) DEVICE — SUTURE, VICRYL, 2-0, 18 IN CP-2, UNDYED

## (undated) DEVICE — DRESSING, GAUZE, SPONGE, 8 PLY, CURITY, 2 X 2 IN, STERILE

## (undated) DEVICE — APPLICATOR, PREP, CHLORAPREP, W/ORANGE TINT, 10.5ML

## (undated) DEVICE — PROBE, SERFAS, 3.5MM, 50 S, ENERGY SUCTION SYSTEM

## (undated) DEVICE — LABELS, OR GENERAL, W/MARKER

## (undated) DEVICE — STOCKINETTE, IMPERVIOUS, CONVERTORS, SMALL, 6 X 30 IN, DISPOSABLE, POLYESTER, LF, STERILE

## (undated) DEVICE — BLADE, STRYKER, 4.0MM, ANGLED, AGGRESSIVE PLUS

## (undated) DEVICE — GLOVE, SURGICAL, PROTEXIS PI BLUE W/NEUTHERA, 8.0, PF, LF

## (undated) DEVICE — PADDING, UNDERCAST, WEBRIL, 6 IN X 4 YD, REG, NS

## (undated) DEVICE — DRESSING, MEPILEX BORDER, POST-OP AG, 4 X 10 IN

## (undated) DEVICE — DRESSING, TRANSPARENT, TEGADERM, 4 X 4-3/4 IN

## (undated) DEVICE — SOLUTION, IRRIGATION, SODIUM CHLORIDE 0.9%, 1000 ML, POUR BOTTLE

## (undated) DEVICE — NEEDLE, SPINAL, QUINCKE, 18 G X 3.5 IN, PINK HUB

## (undated) DEVICE — TUBING, SUCTION, CONNECTING, 9/32 X 10FT, LF

## (undated) DEVICE — DRESSING, ABDOMINAL, WET PRUF, TENDERSORB, 5 X 9 IN, STERILE

## (undated) DEVICE — TUBING, PATIENT 8FT STERILE

## (undated) DEVICE — GOWN, ASTOUND, XL

## (undated) DEVICE — BANDAGE, COFLEX, 4 X 5 YDS, FOAM TAN, STERILE, LF

## (undated) DEVICE — SUTURE, VICRYL, 0, 36 IN, CT-1, UNDYED

## (undated) DEVICE — GLOVE, SURGICAL, PROTEXIS PI ORTHO, 8.0, PF, LF

## (undated) DEVICE — SUTURE, STRATAFIX, 3-0, SPIRAL MONOCRYL PLUS, PS, 70CM, UNDYED

## (undated) DEVICE — BANDAGE, ELASTIC, MATRIX, SELF-CLOSURE, 6 IN X 5 YD, LF

## (undated) DEVICE — TOWEL, SURGICAL, NEURO, O/R, 16 X 26, BLUE, STERILE

## (undated) DEVICE — STRIP, SKIN CLOSURE, STERI STRIP, REINFORCED, 0.5 X 4 IN